# Patient Record
Sex: FEMALE | Race: WHITE | NOT HISPANIC OR LATINO | Employment: OTHER | URBAN - METROPOLITAN AREA
[De-identification: names, ages, dates, MRNs, and addresses within clinical notes are randomized per-mention and may not be internally consistent; named-entity substitution may affect disease eponyms.]

---

## 2019-03-11 ENCOUNTER — RECORDS - HEALTHEAST (OUTPATIENT)
Dept: LAB | Facility: CLINIC | Age: 69
End: 2019-03-11

## 2019-03-11 LAB
ALBUMIN SERPL-MCNC: 4.4 G/DL (ref 3.5–5)
ALP SERPL-CCNC: 84 U/L (ref 45–120)
ALT SERPL W P-5'-P-CCNC: 11 U/L (ref 0–45)
ANION GAP SERPL CALCULATED.3IONS-SCNC: 10 MMOL/L (ref 5–18)
AST SERPL W P-5'-P-CCNC: 13 U/L (ref 0–40)
BILIRUB SERPL-MCNC: 0.5 MG/DL (ref 0–1)
BNP SERPL-MCNC: 11 PG/ML (ref 0–117)
BUN SERPL-MCNC: 11 MG/DL (ref 8–22)
CALCIUM SERPL-MCNC: 10.1 MG/DL (ref 8.5–10.5)
CHLORIDE BLD-SCNC: 104 MMOL/L (ref 98–107)
CHOLEST SERPL-MCNC: 221 MG/DL
CO2 SERPL-SCNC: 28 MMOL/L (ref 22–31)
CREAT SERPL-MCNC: 0.88 MG/DL (ref 0.6–1.1)
FASTING STATUS PATIENT QL REPORTED: ABNORMAL
GFR SERPL CREATININE-BSD FRML MDRD: >60 ML/MIN/1.73M2
GLUCOSE BLD-MCNC: 92 MG/DL (ref 70–125)
HDLC SERPL-MCNC: 47 MG/DL
LDLC SERPL CALC-MCNC: 138 MG/DL
POTASSIUM BLD-SCNC: 4.5 MMOL/L (ref 3.5–5)
PROT SERPL-MCNC: 7.4 G/DL (ref 6–8)
SODIUM SERPL-SCNC: 142 MMOL/L (ref 136–145)
TRIGL SERPL-MCNC: 182 MG/DL
TSH SERPL DL<=0.005 MIU/L-ACNC: 1.07 UIU/ML (ref 0.3–5)

## 2019-03-18 ENCOUNTER — RECORDS - HEALTHEAST (OUTPATIENT)
Dept: LAB | Facility: CLINIC | Age: 69
End: 2019-03-18

## 2019-03-18 LAB
FOLATE SERPL-MCNC: 10.1 NG/ML
VIT B12 SERPL-MCNC: 219 PG/ML (ref 213–816)

## 2020-11-30 ENCOUNTER — RECORDS - HEALTHEAST (OUTPATIENT)
Dept: LAB | Facility: CLINIC | Age: 70
End: 2020-11-30

## 2020-11-30 LAB
ANION GAP SERPL CALCULATED.3IONS-SCNC: 13 MMOL/L (ref 5–18)
BUN SERPL-MCNC: 11 MG/DL (ref 8–28)
CALCIUM SERPL-MCNC: 10.3 MG/DL (ref 8.5–10.5)
CHLORIDE BLD-SCNC: 102 MMOL/L (ref 98–107)
CHOLEST SERPL-MCNC: 251 MG/DL
CO2 SERPL-SCNC: 26 MMOL/L (ref 22–31)
CREAT SERPL-MCNC: 1.2 MG/DL (ref 0.6–1.1)
FASTING STATUS PATIENT QL REPORTED: ABNORMAL
GFR SERPL CREATININE-BSD FRML MDRD: 44 ML/MIN/1.73M2
GLUCOSE BLD-MCNC: 112 MG/DL (ref 70–125)
HDLC SERPL-MCNC: 51 MG/DL
LDLC SERPL CALC-MCNC: 161 MG/DL
POTASSIUM BLD-SCNC: 4.2 MMOL/L (ref 3.5–5)
SODIUM SERPL-SCNC: 141 MMOL/L (ref 136–145)
TRIGL SERPL-MCNC: 193 MG/DL
VIT B12 SERPL-MCNC: 1701 PG/ML (ref 213–816)

## 2020-12-17 ENCOUNTER — RECORDS - HEALTHEAST (OUTPATIENT)
Dept: LAB | Facility: CLINIC | Age: 70
End: 2020-12-17

## 2020-12-17 LAB
ANION GAP SERPL CALCULATED.3IONS-SCNC: 12 MMOL/L (ref 5–18)
BUN SERPL-MCNC: 17 MG/DL (ref 8–28)
CALCIUM SERPL-MCNC: 9.7 MG/DL (ref 8.5–10.5)
CHLORIDE BLD-SCNC: 101 MMOL/L (ref 98–107)
CO2 SERPL-SCNC: 26 MMOL/L (ref 22–31)
CREAT SERPL-MCNC: 1.09 MG/DL (ref 0.6–1.1)
GFR SERPL CREATININE-BSD FRML MDRD: 49 ML/MIN/1.73M2
GLUCOSE BLD-MCNC: 116 MG/DL (ref 70–125)
POTASSIUM BLD-SCNC: 4.1 MMOL/L (ref 3.5–5)
SODIUM SERPL-SCNC: 139 MMOL/L (ref 136–145)

## 2021-05-27 ENCOUNTER — RECORDS - HEALTHEAST (OUTPATIENT)
Dept: ADMINISTRATIVE | Facility: CLINIC | Age: 71
End: 2021-05-27

## 2021-12-16 ENCOUNTER — LAB REQUISITION (OUTPATIENT)
Dept: LAB | Facility: CLINIC | Age: 71
End: 2021-12-16
Payer: COMMERCIAL

## 2021-12-16 DIAGNOSIS — E78.2 MIXED HYPERLIPIDEMIA: ICD-10-CM

## 2021-12-16 DIAGNOSIS — R20.2 PARESTHESIA OF SKIN: ICD-10-CM

## 2021-12-16 LAB
ALBUMIN SERPL-MCNC: 4.1 G/DL (ref 3.5–5)
ALP SERPL-CCNC: 101 U/L (ref 45–120)
ALT SERPL W P-5'-P-CCNC: 14 U/L (ref 0–45)
ANION GAP SERPL CALCULATED.3IONS-SCNC: 11 MMOL/L (ref 5–18)
AST SERPL W P-5'-P-CCNC: 17 U/L (ref 0–40)
BILIRUB SERPL-MCNC: 0.4 MG/DL (ref 0–1)
BUN SERPL-MCNC: 10 MG/DL (ref 8–28)
CALCIUM SERPL-MCNC: 10 MG/DL (ref 8.5–10.5)
CHLORIDE BLD-SCNC: 106 MMOL/L (ref 98–107)
CHOLEST SERPL-MCNC: 193 MG/DL
CO2 SERPL-SCNC: 25 MMOL/L (ref 22–31)
CREAT SERPL-MCNC: 0.92 MG/DL (ref 0.6–1.1)
FASTING STATUS PATIENT QL REPORTED: NORMAL
GFR SERPL CREATININE-BSD FRML MDRD: 62 ML/MIN/1.73M2
GLUCOSE BLD-MCNC: 105 MG/DL (ref 70–125)
HDLC SERPL-MCNC: 50 MG/DL
LDLC SERPL CALC-MCNC: 120 MG/DL
POTASSIUM BLD-SCNC: 4.3 MMOL/L (ref 3.5–5)
PROT SERPL-MCNC: 7 G/DL (ref 6–8)
SODIUM SERPL-SCNC: 142 MMOL/L (ref 136–145)
TRIGL SERPL-MCNC: 114 MG/DL
TSH SERPL DL<=0.005 MIU/L-ACNC: 1.28 UIU/ML (ref 0.3–5)

## 2021-12-16 PROCEDURE — 80061 LIPID PANEL: CPT | Mod: ORL | Performed by: FAMILY MEDICINE

## 2021-12-16 PROCEDURE — 84443 ASSAY THYROID STIM HORMONE: CPT | Mod: ORL | Performed by: FAMILY MEDICINE

## 2021-12-16 PROCEDURE — 80053 COMPREHEN METABOLIC PANEL: CPT | Mod: ORL | Performed by: FAMILY MEDICINE

## 2023-02-01 ENCOUNTER — LAB REQUISITION (OUTPATIENT)
Dept: LAB | Facility: CLINIC | Age: 73
End: 2023-02-01
Payer: COMMERCIAL

## 2023-02-01 DIAGNOSIS — E78.2 MIXED HYPERLIPIDEMIA: ICD-10-CM

## 2023-02-01 DIAGNOSIS — E53.8 DEFICIENCY OF OTHER SPECIFIED B GROUP VITAMINS: ICD-10-CM

## 2023-02-01 LAB
ALBUMIN SERPL BCG-MCNC: 4.4 G/DL (ref 3.5–5.2)
ALP SERPL-CCNC: 101 U/L (ref 35–104)
ALT SERPL W P-5'-P-CCNC: 17 U/L (ref 10–35)
ANION GAP SERPL CALCULATED.3IONS-SCNC: 13 MMOL/L (ref 7–15)
AST SERPL W P-5'-P-CCNC: 21 U/L (ref 10–35)
BILIRUB SERPL-MCNC: 0.3 MG/DL
BUN SERPL-MCNC: 14.6 MG/DL (ref 8–23)
CALCIUM SERPL-MCNC: 9.7 MG/DL (ref 8.8–10.2)
CHLORIDE SERPL-SCNC: 103 MMOL/L (ref 98–107)
CHOLEST SERPL-MCNC: 215 MG/DL
CREAT SERPL-MCNC: 0.98 MG/DL (ref 0.51–0.95)
DEPRECATED HCO3 PLAS-SCNC: 25 MMOL/L (ref 22–29)
GFR SERPL CREATININE-BSD FRML MDRD: 61 ML/MIN/1.73M2
GLUCOSE SERPL-MCNC: 107 MG/DL (ref 70–99)
HDLC SERPL-MCNC: 48 MG/DL
LDLC SERPL CALC-MCNC: 149 MG/DL
NONHDLC SERPL-MCNC: 167 MG/DL
POTASSIUM SERPL-SCNC: 4.4 MMOL/L (ref 3.4–5.3)
PROT SERPL-MCNC: 6.8 G/DL (ref 6.4–8.3)
SODIUM SERPL-SCNC: 141 MMOL/L (ref 136–145)
TRIGL SERPL-MCNC: 91 MG/DL
VIT B12 SERPL-MCNC: 1182 PG/ML (ref 232–1245)

## 2023-02-01 PROCEDURE — 80053 COMPREHEN METABOLIC PANEL: CPT | Mod: ORL | Performed by: FAMILY MEDICINE

## 2023-02-01 PROCEDURE — 82607 VITAMIN B-12: CPT | Mod: ORL | Performed by: FAMILY MEDICINE

## 2023-02-01 PROCEDURE — 80061 LIPID PANEL: CPT | Mod: ORL | Performed by: FAMILY MEDICINE

## 2024-04-02 ENCOUNTER — LAB REQUISITION (OUTPATIENT)
Dept: LAB | Facility: CLINIC | Age: 74
End: 2024-04-02
Payer: COMMERCIAL

## 2024-04-02 DIAGNOSIS — E53.8 DEFICIENCY OF OTHER SPECIFIED B GROUP VITAMINS: ICD-10-CM

## 2024-04-02 DIAGNOSIS — I10 ESSENTIAL (PRIMARY) HYPERTENSION: ICD-10-CM

## 2024-04-02 LAB
ANION GAP SERPL CALCULATED.3IONS-SCNC: 12 MMOL/L (ref 7–15)
BUN SERPL-MCNC: 9.9 MG/DL (ref 8–23)
CALCIUM SERPL-MCNC: 10.1 MG/DL (ref 8.8–10.2)
CHLORIDE SERPL-SCNC: 105 MMOL/L (ref 98–107)
CREAT SERPL-MCNC: 0.99 MG/DL (ref 0.51–0.95)
DEPRECATED HCO3 PLAS-SCNC: 25 MMOL/L (ref 22–29)
EGFRCR SERPLBLD CKD-EPI 2021: 60 ML/MIN/1.73M2
GLUCOSE SERPL-MCNC: 117 MG/DL (ref 70–99)
POTASSIUM SERPL-SCNC: 4.4 MMOL/L (ref 3.4–5.3)
SODIUM SERPL-SCNC: 142 MMOL/L (ref 135–145)
VIT B12 SERPL-MCNC: 1151 PG/ML (ref 232–1245)

## 2024-04-02 PROCEDURE — 82607 VITAMIN B-12: CPT | Mod: ORL | Performed by: STUDENT IN AN ORGANIZED HEALTH CARE EDUCATION/TRAINING PROGRAM

## 2024-04-02 PROCEDURE — 80048 BASIC METABOLIC PNL TOTAL CA: CPT | Mod: ORL | Performed by: STUDENT IN AN ORGANIZED HEALTH CARE EDUCATION/TRAINING PROGRAM

## 2024-04-04 ENCOUNTER — LAB REQUISITION (OUTPATIENT)
Dept: LAB | Facility: CLINIC | Age: 74
End: 2024-04-04
Payer: COMMERCIAL

## 2024-04-04 LAB
GRAM STAIN RESULT: ABNORMAL
GRAM STAIN RESULT: ABNORMAL

## 2024-04-04 PROCEDURE — 87186 SC STD MICRODIL/AGAR DIL: CPT | Mod: ORL | Performed by: ORTHOPAEDIC SURGERY

## 2024-04-04 PROCEDURE — 87075 CULTR BACTERIA EXCEPT BLOOD: CPT | Mod: ORL | Performed by: ORTHOPAEDIC SURGERY

## 2024-04-04 PROCEDURE — 87205 SMEAR GRAM STAIN: CPT | Mod: ORL | Performed by: ORTHOPAEDIC SURGERY

## 2024-04-09 LAB
BACTERIA SPEC CULT: ABNORMAL

## 2024-04-11 LAB — BACTERIA SPEC CULT: NORMAL

## 2024-05-29 ENCOUNTER — LAB REQUISITION (OUTPATIENT)
Dept: LAB | Facility: CLINIC | Age: 74
End: 2024-05-29
Payer: COMMERCIAL

## 2024-05-29 DIAGNOSIS — E78.2 MIXED HYPERLIPIDEMIA: ICD-10-CM

## 2024-05-29 PROCEDURE — 80061 LIPID PANEL: CPT | Mod: ORL | Performed by: FAMILY MEDICINE

## 2024-05-30 LAB
CHOLEST SERPL-MCNC: 152 MG/DL
FASTING STATUS PATIENT QL REPORTED: ABNORMAL
HDLC SERPL-MCNC: 47 MG/DL
LDLC SERPL CALC-MCNC: 77 MG/DL
NONHDLC SERPL-MCNC: 105 MG/DL
TRIGL SERPL-MCNC: 140 MG/DL

## 2024-10-18 ENCOUNTER — LAB REQUISITION (OUTPATIENT)
Dept: LAB | Facility: CLINIC | Age: 74
End: 2024-10-18
Payer: COMMERCIAL

## 2024-10-18 DIAGNOSIS — I10 ESSENTIAL (PRIMARY) HYPERTENSION: ICD-10-CM

## 2024-10-18 LAB
ANION GAP SERPL CALCULATED.3IONS-SCNC: 13 MMOL/L (ref 7–15)
BUN SERPL-MCNC: 12.8 MG/DL (ref 8–23)
CALCIUM SERPL-MCNC: 10.4 MG/DL (ref 8.8–10.4)
CHLORIDE SERPL-SCNC: 103 MMOL/L (ref 98–107)
CREAT SERPL-MCNC: 1.03 MG/DL (ref 0.51–0.95)
EGFRCR SERPLBLD CKD-EPI 2021: 56 ML/MIN/1.73M2
GLUCOSE SERPL-MCNC: 100 MG/DL (ref 70–99)
HCO3 SERPL-SCNC: 26 MMOL/L (ref 22–29)
POTASSIUM SERPL-SCNC: 4.2 MMOL/L (ref 3.4–5.3)
SODIUM SERPL-SCNC: 142 MMOL/L (ref 135–145)

## 2024-10-18 PROCEDURE — 80048 BASIC METABOLIC PNL TOTAL CA: CPT | Mod: ORL | Performed by: STUDENT IN AN ORGANIZED HEALTH CARE EDUCATION/TRAINING PROGRAM

## 2024-11-11 ENCOUNTER — LAB REQUISITION (OUTPATIENT)
Dept: LAB | Facility: CLINIC | Age: 74
End: 2024-11-11
Payer: COMMERCIAL

## 2024-11-11 DIAGNOSIS — I10 ESSENTIAL (PRIMARY) HYPERTENSION: ICD-10-CM

## 2024-11-11 PROCEDURE — 80048 BASIC METABOLIC PNL TOTAL CA: CPT | Mod: ORL

## 2024-11-12 LAB
ANION GAP SERPL CALCULATED.3IONS-SCNC: 13 MMOL/L (ref 7–15)
BUN SERPL-MCNC: 13.2 MG/DL (ref 8–23)
CALCIUM SERPL-MCNC: 10.3 MG/DL (ref 8.8–10.4)
CHLORIDE SERPL-SCNC: 102 MMOL/L (ref 98–107)
CREAT SERPL-MCNC: 1.37 MG/DL (ref 0.51–0.95)
EGFRCR SERPLBLD CKD-EPI 2021: 40 ML/MIN/1.73M2
GLUCOSE SERPL-MCNC: 96 MG/DL (ref 70–99)
HCO3 SERPL-SCNC: 27 MMOL/L (ref 22–29)
POTASSIUM SERPL-SCNC: 4.8 MMOL/L (ref 3.4–5.3)
SODIUM SERPL-SCNC: 142 MMOL/L (ref 135–145)

## 2024-11-17 ENCOUNTER — HOSPITAL ENCOUNTER (INPATIENT)
Facility: CLINIC | Age: 74
LOS: 2 days | Discharge: NURSING, REHAB OR LONG-TERM CARE | End: 2024-11-20
Attending: EMERGENCY MEDICINE | Admitting: STUDENT IN AN ORGANIZED HEALTH CARE EDUCATION/TRAINING PROGRAM
Payer: COMMERCIAL

## 2024-11-17 DIAGNOSIS — A41.9 ACUTE SEPSIS (H): ICD-10-CM

## 2024-11-17 DIAGNOSIS — K57.20 DIVERTICULITIS OF COLON WITH PERFORATION: ICD-10-CM

## 2024-11-17 PROCEDURE — 83605 ASSAY OF LACTIC ACID: CPT | Performed by: EMERGENCY MEDICINE

## 2024-11-17 PROCEDURE — 80053 COMPREHEN METABOLIC PANEL: CPT | Performed by: EMERGENCY MEDICINE

## 2024-11-17 PROCEDURE — 87040 BLOOD CULTURE FOR BACTERIA: CPT | Performed by: EMERGENCY MEDICINE

## 2024-11-17 PROCEDURE — 84484 ASSAY OF TROPONIN QUANT: CPT | Performed by: EMERGENCY MEDICINE

## 2024-11-17 PROCEDURE — 99291 CRITICAL CARE FIRST HOUR: CPT | Mod: 25

## 2024-11-17 PROCEDURE — 81003 URINALYSIS AUTO W/O SCOPE: CPT | Performed by: EMERGENCY MEDICINE

## 2024-11-17 PROCEDURE — 93005 ELECTROCARDIOGRAM TRACING: CPT | Performed by: EMERGENCY MEDICINE

## 2024-11-17 PROCEDURE — 87637 SARSCOV2&INF A&B&RSV AMP PRB: CPT | Performed by: EMERGENCY MEDICINE

## 2024-11-17 PROCEDURE — 258N000003 HC RX IP 258 OP 636: Performed by: EMERGENCY MEDICINE

## 2024-11-17 PROCEDURE — 85004 AUTOMATED DIFF WBC COUNT: CPT | Performed by: EMERGENCY MEDICINE

## 2024-11-17 PROCEDURE — 36415 COLL VENOUS BLD VENIPUNCTURE: CPT | Performed by: EMERGENCY MEDICINE

## 2024-11-17 PROCEDURE — 84145 PROCALCITONIN (PCT): CPT | Performed by: EMERGENCY MEDICINE

## 2024-11-17 PROCEDURE — 85610 PROTHROMBIN TIME: CPT | Performed by: EMERGENCY MEDICINE

## 2024-11-17 RX ORDER — CEFEPIME HYDROCHLORIDE 2 G/1
2 INJECTION, POWDER, FOR SOLUTION INTRAVENOUS ONCE
Status: DISCONTINUED | OUTPATIENT
Start: 2024-11-18 | End: 2024-11-17

## 2024-11-17 RX ORDER — ACETAMINOPHEN 325 MG/1
650 TABLET ORAL
Status: DISCONTINUED | OUTPATIENT
Start: 2024-11-17 | End: 2024-11-18

## 2024-11-17 RX ORDER — SODIUM CHLORIDE 9 MG/ML
INJECTION, SOLUTION INTRAVENOUS CONTINUOUS
Status: DISCONTINUED | OUTPATIENT
Start: 2024-11-18 | End: 2024-11-19

## 2024-11-17 RX ORDER — ACETAMINOPHEN 650 MG/1
650 SUPPOSITORY RECTAL
Status: DISCONTINUED | OUTPATIENT
Start: 2024-11-17 | End: 2024-11-18

## 2024-11-17 RX ORDER — MEROPENEM 1 G/1
1 INJECTION, POWDER, FOR SOLUTION INTRAVENOUS ONCE
Status: COMPLETED | OUTPATIENT
Start: 2024-11-18 | End: 2024-11-18

## 2024-11-17 RX ORDER — METRONIDAZOLE 500 MG/100ML
500 INJECTION, SOLUTION INTRAVENOUS ONCE
Status: DISCONTINUED | OUTPATIENT
Start: 2024-11-18 | End: 2024-11-17

## 2024-11-17 RX ADMIN — SODIUM CHLORIDE 2000 ML: 9 INJECTION, SOLUTION INTRAVENOUS at 23:58

## 2024-11-17 ASSESSMENT — COLUMBIA-SUICIDE SEVERITY RATING SCALE - C-SSRS
1. IN THE PAST MONTH, HAVE YOU WISHED YOU WERE DEAD OR WISHED YOU COULD GO TO SLEEP AND NOT WAKE UP?: NO
2. HAVE YOU ACTUALLY HAD ANY THOUGHTS OF KILLING YOURSELF IN THE PAST MONTH?: NO
6. HAVE YOU EVER DONE ANYTHING, STARTED TO DO ANYTHING, OR PREPARED TO DO ANYTHING TO END YOUR LIFE?: NO

## 2024-11-18 ENCOUNTER — APPOINTMENT (OUTPATIENT)
Dept: RADIOLOGY | Facility: CLINIC | Age: 74
End: 2024-11-18
Attending: EMERGENCY MEDICINE
Payer: COMMERCIAL

## 2024-11-18 ENCOUNTER — APPOINTMENT (OUTPATIENT)
Dept: CT IMAGING | Facility: CLINIC | Age: 74
End: 2024-11-18
Attending: EMERGENCY MEDICINE
Payer: COMMERCIAL

## 2024-11-18 PROBLEM — A41.9 ACUTE SEPSIS (H): Status: ACTIVE | Noted: 2024-11-18

## 2024-11-18 PROBLEM — K57.20 DIVERTICULITIS OF COLON WITH PERFORATION: Status: ACTIVE | Noted: 2024-11-18

## 2024-11-18 LAB
ALBUMIN SERPL BCG-MCNC: 4.2 G/DL (ref 3.5–5.2)
ALBUMIN UR-MCNC: 30 MG/DL
ALP SERPL-CCNC: 86 U/L (ref 40–150)
ALT SERPL W P-5'-P-CCNC: 16 U/L (ref 0–50)
ANION GAP SERPL CALCULATED.3IONS-SCNC: 15 MMOL/L (ref 7–15)
APPEARANCE UR: CLEAR
AST SERPL W P-5'-P-CCNC: 24 U/L (ref 0–45)
ATRIAL RATE - MUSE: 106 BPM
ATRIAL RATE - MUSE: 121 BPM
BASOPHILS # BLD AUTO: 0 10E3/UL (ref 0–0.2)
BASOPHILS NFR BLD AUTO: 0 %
BILIRUB SERPL-MCNC: 1 MG/DL
BILIRUB UR QL STRIP: NEGATIVE
BUN SERPL-MCNC: 17 MG/DL (ref 8–23)
CALCIUM SERPL-MCNC: 10.2 MG/DL (ref 8.8–10.4)
CHLORIDE SERPL-SCNC: 99 MMOL/L (ref 98–107)
COLOR UR AUTO: YELLOW
CREAT SERPL-MCNC: 1.2 MG/DL (ref 0.51–0.95)
DIASTOLIC BLOOD PRESSURE - MUSE: 75 MMHG
DIASTOLIC BLOOD PRESSURE - MUSE: NORMAL MMHG
EGFRCR SERPLBLD CKD-EPI 2021: 47 ML/MIN/1.73M2
EOSINOPHIL # BLD AUTO: 0 10E3/UL (ref 0–0.7)
EOSINOPHIL NFR BLD AUTO: 0 %
ERYTHROCYTE [DISTWIDTH] IN BLOOD BY AUTOMATED COUNT: 12.5 % (ref 10–15)
FLUAV RNA SPEC QL NAA+PROBE: NEGATIVE
FLUBV RNA RESP QL NAA+PROBE: NEGATIVE
GLUCOSE SERPL-MCNC: 162 MG/DL (ref 70–99)
GLUCOSE UR STRIP-MCNC: NEGATIVE MG/DL
HCO3 SERPL-SCNC: 24 MMOL/L (ref 22–29)
HCT VFR BLD AUTO: 39.3 % (ref 35–47)
HGB BLD-MCNC: 13.3 G/DL (ref 11.7–15.7)
HGB UR QL STRIP: ABNORMAL
IMM GRANULOCYTES # BLD: 0.2 10E3/UL
IMM GRANULOCYTES NFR BLD: 1 %
INR PPP: 1.21 (ref 0.85–1.15)
INTERPRETATION ECG - MUSE: NORMAL
INTERPRETATION ECG - MUSE: NORMAL
KETONES UR STRIP-MCNC: 10 MG/DL
LACTATE SERPL-SCNC: 1.5 MMOL/L (ref 0.7–2)
LEUKOCYTE ESTERASE UR QL STRIP: ABNORMAL
LYMPHOCYTES # BLD AUTO: 1 10E3/UL (ref 0.8–5.3)
LYMPHOCYTES NFR BLD AUTO: 5 %
MCH RBC QN AUTO: 29.7 PG (ref 26.5–33)
MCHC RBC AUTO-ENTMCNC: 33.8 G/DL (ref 31.5–36.5)
MCV RBC AUTO: 88 FL (ref 78–100)
MONOCYTES # BLD AUTO: 1.5 10E3/UL (ref 0–1.3)
MONOCYTES NFR BLD AUTO: 8 %
NEUTROPHILS # BLD AUTO: 16.8 10E3/UL (ref 1.6–8.3)
NEUTROPHILS NFR BLD AUTO: 86 %
NITRATE UR QL: NEGATIVE
NRBC # BLD AUTO: 0 10E3/UL
NRBC BLD AUTO-RTO: 0 /100
P AXIS - MUSE: 60 DEGREES
P AXIS - MUSE: 67 DEGREES
PH UR STRIP: 6.5 [PH] (ref 5–7)
PLATELET # BLD AUTO: 207 10E3/UL (ref 150–450)
POTASSIUM SERPL-SCNC: 4 MMOL/L (ref 3.4–5.3)
PR INTERVAL - MUSE: 152 MS
PR INTERVAL - MUSE: 158 MS
PROCALCITONIN SERPL IA-MCNC: 0.41 NG/ML
PROT SERPL-MCNC: 7.7 G/DL (ref 6.4–8.3)
QRS DURATION - MUSE: 82 MS
QRS DURATION - MUSE: 86 MS
QT - MUSE: 320 MS
QT - MUSE: 336 MS
QTC - MUSE: 446 MS
QTC - MUSE: 454 MS
R AXIS - MUSE: 52 DEGREES
R AXIS - MUSE: 58 DEGREES
RBC # BLD AUTO: 4.48 10E6/UL (ref 3.8–5.2)
RBC URINE: 3 /HPF
RSV RNA SPEC NAA+PROBE: NEGATIVE
SARS-COV-2 RNA RESP QL NAA+PROBE: NEGATIVE
SODIUM SERPL-SCNC: 138 MMOL/L (ref 135–145)
SP GR UR STRIP: 1.02 (ref 1–1.03)
SQUAMOUS EPITHELIAL: 1 /HPF
SYSTOLIC BLOOD PRESSURE - MUSE: 112 MMHG
SYSTOLIC BLOOD PRESSURE - MUSE: NORMAL MMHG
T AXIS - MUSE: 58 DEGREES
T AXIS - MUSE: 61 DEGREES
TROPONIN T SERPL HS-MCNC: 14 NG/L
TROPONIN T SERPL HS-MCNC: 15 NG/L
UROBILINOGEN UR STRIP-MCNC: <2 MG/DL
VENTRICULAR RATE- MUSE: 106 BPM
VENTRICULAR RATE- MUSE: 121 BPM
WBC # BLD AUTO: 19.6 10E3/UL (ref 4–11)
WBC URINE: 3 /HPF

## 2024-11-18 PROCEDURE — 93005 ELECTROCARDIOGRAM TRACING: CPT | Performed by: EMERGENCY MEDICINE

## 2024-11-18 PROCEDURE — 120N000001 HC R&B MED SURG/OB

## 2024-11-18 PROCEDURE — 250N000013 HC RX MED GY IP 250 OP 250 PS 637

## 2024-11-18 PROCEDURE — 250N000011 HC RX IP 250 OP 636

## 2024-11-18 PROCEDURE — 84484 ASSAY OF TROPONIN QUANT: CPT | Performed by: EMERGENCY MEDICINE

## 2024-11-18 PROCEDURE — 96365 THER/PROPH/DIAG IV INF INIT: CPT | Mod: 59

## 2024-11-18 PROCEDURE — 250N000013 HC RX MED GY IP 250 OP 250 PS 637: Performed by: SURGERY

## 2024-11-18 PROCEDURE — 250N000011 HC RX IP 250 OP 636: Performed by: EMERGENCY MEDICINE

## 2024-11-18 PROCEDURE — 258N000003 HC RX IP 258 OP 636

## 2024-11-18 PROCEDURE — 96361 HYDRATE IV INFUSION ADD-ON: CPT

## 2024-11-18 PROCEDURE — 36415 COLL VENOUS BLD VENIPUNCTURE: CPT | Performed by: EMERGENCY MEDICINE

## 2024-11-18 PROCEDURE — 74177 CT ABD & PELVIS W/CONTRAST: CPT

## 2024-11-18 PROCEDURE — 99223 1ST HOSP IP/OBS HIGH 75: CPT | Mod: AI | Performed by: STUDENT IN AN ORGANIZED HEALTH CARE EDUCATION/TRAINING PROGRAM

## 2024-11-18 PROCEDURE — 99222 1ST HOSP IP/OBS MODERATE 55: CPT | Performed by: SURGERY

## 2024-11-18 PROCEDURE — 71045 X-RAY EXAM CHEST 1 VIEW: CPT

## 2024-11-18 PROCEDURE — 258N000003 HC RX IP 258 OP 636: Performed by: EMERGENCY MEDICINE

## 2024-11-18 PROCEDURE — 250N000013 HC RX MED GY IP 250 OP 250 PS 637: Performed by: EMERGENCY MEDICINE

## 2024-11-18 RX ORDER — METRONIDAZOLE 500 MG/1
500 TABLET ORAL 3 TIMES DAILY
Status: DISCONTINUED | OUTPATIENT
Start: 2024-11-18 | End: 2024-11-19

## 2024-11-18 RX ORDER — PROCHLORPERAZINE MALEATE 5 MG/1
5 TABLET ORAL EVERY 6 HOURS PRN
Status: DISCONTINUED | OUTPATIENT
Start: 2024-11-18 | End: 2024-11-20 | Stop reason: HOSPADM

## 2024-11-18 RX ORDER — SIMVASTATIN 20 MG
20 TABLET ORAL AT BEDTIME
Status: DISCONTINUED | OUTPATIENT
Start: 2024-11-18 | End: 2024-11-20 | Stop reason: HOSPADM

## 2024-11-18 RX ORDER — ONDANSETRON 2 MG/ML
4 INJECTION INTRAMUSCULAR; INTRAVENOUS EVERY 6 HOURS PRN
Status: DISCONTINUED | OUTPATIENT
Start: 2024-11-18 | End: 2024-11-20 | Stop reason: HOSPADM

## 2024-11-18 RX ORDER — SIMVASTATIN 20 MG
20 TABLET ORAL AT BEDTIME
COMMUNITY

## 2024-11-18 RX ORDER — MEROPENEM 1 G/1
1 INJECTION, POWDER, FOR SOLUTION INTRAVENOUS EVERY 12 HOURS
Status: DISCONTINUED | OUTPATIENT
Start: 2024-11-18 | End: 2024-11-18

## 2024-11-18 RX ORDER — FAMOTIDINE 20 MG/1
20 TABLET, FILM COATED ORAL DAILY
COMMUNITY

## 2024-11-18 RX ORDER — FLUTICASONE FUROATE, UMECLIDINIUM BROMIDE AND VILANTEROL TRIFENATATE 100; 62.5; 25 UG/1; UG/1; UG/1
1 POWDER RESPIRATORY (INHALATION) DAILY
COMMUNITY

## 2024-11-18 RX ORDER — POLYETHYLENE GLYCOL 3350 17 G/17G
17 POWDER, FOR SOLUTION ORAL 2 TIMES DAILY PRN
Status: DISCONTINUED | OUTPATIENT
Start: 2024-11-18 | End: 2024-11-20 | Stop reason: HOSPADM

## 2024-11-18 RX ORDER — LANOLIN ALCOHOL/MO/W.PET/CERES
1000 CREAM (GRAM) TOPICAL DAILY
COMMUNITY

## 2024-11-18 RX ORDER — CALCIUM CARBONATE 500 MG/1
1 TABLET, CHEWABLE ORAL 4 TIMES DAILY PRN
COMMUNITY

## 2024-11-18 RX ORDER — SIMVASTATIN 40 MG
40 TABLET ORAL EVERY EVENING
Status: ON HOLD | COMMUNITY
Start: 2023-12-16 | End: 2024-11-18

## 2024-11-18 RX ORDER — MAGNESIUM HYDROXIDE/ALUMINUM HYDROXICE/SIMETHICONE 120; 1200; 1200 MG/30ML; MG/30ML; MG/30ML
15 SUSPENSION ORAL ONCE
Status: COMPLETED | OUTPATIENT
Start: 2024-11-18 | End: 2024-11-18

## 2024-11-18 RX ORDER — ONDANSETRON 4 MG/1
4 TABLET, ORALLY DISINTEGRATING ORAL EVERY 6 HOURS PRN
Status: DISCONTINUED | OUTPATIENT
Start: 2024-11-18 | End: 2024-11-20 | Stop reason: HOSPADM

## 2024-11-18 RX ORDER — CIPROFLOXACIN 500 MG/1
500 TABLET, FILM COATED ORAL EVERY 12 HOURS SCHEDULED
Status: DISCONTINUED | OUTPATIENT
Start: 2024-11-18 | End: 2024-11-19

## 2024-11-18 RX ORDER — PLANT STANOL ESTER 450 MG
1 TABLET ORAL DAILY
COMMUNITY

## 2024-11-18 RX ORDER — ALBUTEROL SULFATE 90 UG/1
1 INHALANT RESPIRATORY (INHALATION) EVERY 4 HOURS PRN
Status: DISCONTINUED | OUTPATIENT
Start: 2024-11-18 | End: 2024-11-20 | Stop reason: HOSPADM

## 2024-11-18 RX ORDER — LIDOCAINE 40 MG/G
CREAM TOPICAL
Status: DISCONTINUED | OUTPATIENT
Start: 2024-11-18 | End: 2024-11-20 | Stop reason: HOSPADM

## 2024-11-18 RX ORDER — LOSARTAN POTASSIUM 50 MG/1
25 TABLET ORAL DAILY
COMMUNITY

## 2024-11-18 RX ORDER — AMOXICILLIN 250 MG
1 CAPSULE ORAL 2 TIMES DAILY PRN
Status: DISCONTINUED | OUTPATIENT
Start: 2024-11-18 | End: 2024-11-20 | Stop reason: HOSPADM

## 2024-11-18 RX ORDER — ALBUTEROL SULFATE 90 UG/1
1 INHALANT RESPIRATORY (INHALATION) EVERY 4 HOURS PRN
COMMUNITY

## 2024-11-18 RX ORDER — LOSARTAN POTASSIUM 25 MG/1
25 TABLET ORAL DAILY
Status: DISCONTINUED | OUTPATIENT
Start: 2024-11-18 | End: 2024-11-20 | Stop reason: HOSPADM

## 2024-11-18 RX ORDER — ACETAMINOPHEN 325 MG/1
325 TABLET ORAL EVERY 4 HOURS PRN
Status: DISCONTINUED | OUTPATIENT
Start: 2024-11-18 | End: 2024-11-20 | Stop reason: HOSPADM

## 2024-11-18 RX ORDER — AMOXICILLIN 250 MG
2 CAPSULE ORAL 2 TIMES DAILY PRN
Status: DISCONTINUED | OUTPATIENT
Start: 2024-11-18 | End: 2024-11-20 | Stop reason: HOSPADM

## 2024-11-18 RX ORDER — ACETAMINOPHEN 650 MG/1
650 SUPPOSITORY RECTAL EVERY 4 HOURS PRN
Status: DISCONTINUED | OUTPATIENT
Start: 2024-11-18 | End: 2024-11-20 | Stop reason: HOSPADM

## 2024-11-18 RX ORDER — CALCIUM CARBONATE 500 MG/1
1000 TABLET, CHEWABLE ORAL 4 TIMES DAILY PRN
Status: DISCONTINUED | OUTPATIENT
Start: 2024-11-18 | End: 2024-11-19

## 2024-11-18 RX ORDER — MAGNESIUM HYDROXIDE/ALUMINUM HYDROXICE/SIMETHICONE 120; 1200; 1200 MG/30ML; MG/30ML; MG/30ML
30 SUSPENSION ORAL EVERY 4 HOURS PRN
Status: DISCONTINUED | OUTPATIENT
Start: 2024-11-18 | End: 2024-11-20 | Stop reason: HOSPADM

## 2024-11-18 RX ORDER — CALCIUM CARBONATE 500 MG/1
1000 TABLET, CHEWABLE ORAL ONCE
Status: COMPLETED | OUTPATIENT
Start: 2024-11-18 | End: 2024-11-18

## 2024-11-18 RX ORDER — IOPAMIDOL 755 MG/ML
100 INJECTION, SOLUTION INTRAVASCULAR ONCE
Status: DISCONTINUED | OUTPATIENT
Start: 2024-11-18 | End: 2024-11-18

## 2024-11-18 RX ORDER — ACETAMINOPHEN 325 MG/1
650 TABLET ORAL EVERY 4 HOURS PRN
Status: DISCONTINUED | OUTPATIENT
Start: 2024-11-18 | End: 2024-11-20 | Stop reason: HOSPADM

## 2024-11-18 RX ADMIN — ALUMINUM HYDROXIDE, MAGNESIUM HYDROXIDE, AND SIMETHICONE 15 ML: 1200; 120; 1200 SUSPENSION ORAL at 01:46

## 2024-11-18 RX ADMIN — MEROPENEM 1 G: 1 INJECTION, POWDER, FOR SOLUTION INTRAVENOUS at 11:03

## 2024-11-18 RX ADMIN — CIPROFLOXACIN 500 MG: 500 TABLET ORAL at 20:10

## 2024-11-18 RX ADMIN — ACETAMINOPHEN 650 MG: 325 TABLET ORAL at 00:10

## 2024-11-18 RX ADMIN — ALUMINUM HYDROXIDE, MAGNESIUM HYDROXIDE, AND SIMETHICONE 30 ML: 1200; 120; 1200 SUSPENSION ORAL at 03:25

## 2024-11-18 RX ADMIN — LOSARTAN POTASSIUM 25 MG: 25 TABLET, FILM COATED ORAL at 11:06

## 2024-11-18 RX ADMIN — ALBUTEROL SULFATE 1 PUFF: 90 INHALANT RESPIRATORY (INHALATION) at 14:47

## 2024-11-18 RX ADMIN — CALCIUM CARBONATE 1000 MG: 500 TABLET, CHEWABLE ORAL at 00:11

## 2024-11-18 RX ADMIN — SODIUM CHLORIDE: 9 INJECTION, SOLUTION INTRAVENOUS at 16:20

## 2024-11-18 RX ADMIN — METRONIDAZOLE 500 MG: 500 TABLET ORAL at 20:10

## 2024-11-18 RX ADMIN — SODIUM CHLORIDE: 9 INJECTION, SOLUTION INTRAVENOUS at 02:31

## 2024-11-18 RX ADMIN — SODIUM CHLORIDE: 9 INJECTION, SOLUTION INTRAVENOUS at 08:50

## 2024-11-18 RX ADMIN — SODIUM CHLORIDE: 9 INJECTION, SOLUTION INTRAVENOUS at 23:53

## 2024-11-18 RX ADMIN — ALUMINUM HYDROXIDE, MAGNESIUM HYDROXIDE, AND SIMETHICONE 30 ML: 1200; 120; 1200 SUSPENSION ORAL at 14:31

## 2024-11-18 RX ADMIN — MEROPENEM 1 G: 1 INJECTION, POWDER, FOR SOLUTION INTRAVENOUS at 00:04

## 2024-11-18 RX ADMIN — METRONIDAZOLE 500 MG: 500 TABLET ORAL at 14:28

## 2024-11-18 RX ADMIN — SIMVASTATIN 20 MG: 20 TABLET, FILM COATED ORAL at 20:10

## 2024-11-18 RX ADMIN — ACETAMINOPHEN 650 MG: 325 TABLET ORAL at 08:43

## 2024-11-18 RX ADMIN — ACETAMINOPHEN 325 MG: 325 TABLET ORAL at 20:10

## 2024-11-18 RX ADMIN — IOPAMIDOL 75 ML: 755 INJECTION, SOLUTION INTRAVENOUS at 00:54

## 2024-11-18 NOTE — PHARMACY-ADMISSION MEDICATION HISTORY
Pharmacy Intern Admission Medication History    Admission medication history is complete. The information provided in this note is only as accurate as the sources available at the time of the update.    Information Source(s): Patient via in-person    Changes made to PTA medication list:  Added: Albuterol, Trelegy, Losartan, Simvastatin, Calcium-Mg-Zinc, Potassium   Deleted: None  Changed: None    Allergies reviewed with patient and updates made in EHR: yes    Medication History Completed By: Chica Torres 11/18/2024 8:07 AM    PTA Med List   Medication Sig Note Last Dose/Taking    albuterol (PROAIR HFA/PROVENTIL HFA/VENTOLIN HFA) 108 (90 Base) MCG/ACT inhaler Inhale 1 puff into the lungs every 4 hours as needed.  Past Month    Calcium Magnesium Zinc 333-133-5 MG TABS Take 3 tablets by mouth daily.  11/17/2024 Morning    losartan (COZAAR) 50 MG tablet Take 25 mg by mouth daily.  11/17/2024 Morning    potassium gluconate 2.5 MEQ tablet Take 1 tablet by mouth daily. 11/18/2024: Taking OTC. Exact product not known.  11/17/2024    simvastatin (ZOCOR) 20 MG tablet Take 20 mg by mouth at bedtime.  11/16/2024 Bedtime    TRELEGY ELLIPTA 100-62.5-25 MCG/ACT oral inhaler Inhale 1 puff into the lungs daily.  11/17/2024 Morning

## 2024-11-18 NOTE — ED TRIAGE NOTES
Pt is from home where she lives with her son. Pts son left the house around 1400 and returned tonight around 2200. Pt was found to be half in the chair and half out.  Pts son tried to help her up but ended up helping her to  the ground. Pt could not get up on her own, stated she was too weak.   Pt has had some loose stools through out the day and complains of some tender in the abdomen      Triage Assessment (Adult)       Row Name 11/17/24 [id number]          Triage Assessment    Airway WDL WDL        Respiratory WDL    Respiratory WDL WDL        Skin Circulation/Temperature WDL    Skin Circulation/Temperature WDL temperature     Skin Temperature warm        Cardiac WDL    Cardiac WDL WDL        Peripheral/Neurovascular WDL    Peripheral Neurovascular WDL WDL        Cognitive/Neuro/Behavioral WDL    Cognitive/Neuro/Behavioral WDL WDL

## 2024-11-18 NOTE — H&P
[facility]    History and Physical - Hospitalist Service       Date of Admission:  11/17/2024    Assessment & Plan      Alexandra Mckeon is a 74 year old female admitted on 11/17/2024. She has a history of COPD, hypertension, hyperlipidemia, and heartburn and is admitted for acute perforated sigmoid diverticulitis with sepsis.    Medication reconciliation not yet completed    Acute perforated sigmoid diverticulitis  Sepsis  Leukocytosis  Fever  Sinus tachycardia  Presents with a week of generalized abdominal pain and 1 day of generalized weakness and was found to be febrile with leukocytosis of 19.6, tachycardic to 110-120 and with CT demonstrating acute perforated sigmoid diverticulitis.  Reassuringly, exam without rebound or guarding but she does have mild diffuse tenderness in lower quadrants.  Lactic acid within normal limits.  ED provider discussed with on-call general surgery noting localized perforated gas without evidence of abscess at this time and general surgery plans to see patient in the morning. S/p~2.25 L bolus and dose of meropenem in ED.  -General Surgery consulted, appreciate recommendations and cares  -Continue meropenem for now, has penicillin allergy  -Continue maintenance IV fluids  -Bowel rest  -Zofran as needed  -Tylenol as needed  -Follow CBC  -Follow-up outpatient for colonoscopy    Chronic conditions:     Chronic kidney disease stage III  Creatinine of 1.20 on admission with recent creatinine 1.37 one week ago, creatinine around 1.0 in the last year.  -Follow BMP    COPD  -PTA Trelegy  -PTA albuterol    Hypertension  -PTA losartan    Heartburn  -PTA famotidine  -Tums as needed  -Maalox as needed    Hyperlipidemia  -PTA simvastatin        Diet: NPO for Medical/Clinical Reasons Except for: Meds  DVT Prophylaxis: Pneumatic Compression Devices  Ugalde Catheter: Not present  Fluids: mIVF  Lines: None     Cardiac Monitoring: None  Code Status: No CPR- Do NOT  "Intubate    Clinically Significant Risk Factors Present on Admission                # Coagulation Defect: INR = 1.21 (Ref range: 0.85 - 1.15) and/or PTT = N/A, will monitor for bleeding              # Obesity: Estimated body mass index is 30.18 kg/m  as calculated from the following:    Height as of this encounter: 1.575 m (5' 2\").    Weight as of this encounter: 74.8 kg (165 lb).              Disposition Plan      Expected Discharge Date: 11/20/2024                The patient's care was discussed with the Attending Physician, Dr. Rick Somers .      Criselda Crowley MD  Hospitalist Service  [facility]  Securely message with [employer] (more info)  Text page via [facility] Paging/Directory   ______________________________________________________________________    Chief Complaint   Abdominal pain    History is obtained from the patient    History of Present Illness   Alexandra Mckeon is a 74 year old female admitted on 11/17/2024. She has a history of COPD, hypertension, hyperlipidemia, and heartburn and is admitted for acute perforated sigmoid diverticulitis with sepsis.    Patient states she has had about 1 week of generalized dull abdominal pain.  She states the abdominal pain may have gotten worse in recent days.  She also has had 1 day of generalized weakness.  She states earlier today she was unable to get out of the chair on her own.  Her son who lives with her was gone for the day and returned this evening finding her half out of her chair.  Patient has been having diarrhea however this is not atypical for her.  She has had chills but no fever.  No chest pain or shortness of breath.  No significant nausea or vomiting.  Has not had symptoms like this before.    Patient lives at home with her son.  She does not use any assistive device for ambulation.  The patient enjoys watching TV and playing bingo.  She denies any recent falls.  Does all of her own ADLs.  Patient reports roughly 50-year pack " history of tobacco use.  Denies any alcohol use.  Denies any other substance use.      Past Medical History    Past Medical History:   Diagnosis Date    COPD (chronic obstructive pulmonary disease) (H)     HLD (hyperlipidemia)     Hypertension        Past Surgical History   No past surgical history on file.    Prior to Admission Medications   None        Social History   I have reviewed this patient's social history and updated it with pertinent information if needed.  Social History     Tobacco Use    Smoking status: Former     Current packs/day: 0.00     Average packs/day: 1 pack/day for 49.0 years (49.0 ttl pk-yrs)     Types: Cigarettes     Start date:      Quit date:      Years since quittin.8        Physical Exam   Vital Signs: Temp: (!) 100.7  F (38.2  C) Temp src: Oral BP: (!) 147/77 Pulse: 102   Resp: 30 SpO2: 91 % O2 Device: None (Room air)    Weight: 165 lbs 0 oz  Constitutional: awake, alert, cooperative, no apparent distress, and appears stated age  Eyes: Lids and lashes normal, pupils equal, round and reactive to light, extra ocular muscles intact, sclera clear, conjunctiva normal  ENT: Normocephalic, without obvious abnormality, atraumatic, external ears without lesions, oral pharynx with dry mucous membranes, tonsils without erythema or exudates, upper dentures  Respiratory: No increased work of breathing, good air exchange, clear to auscultation bilaterally, no crackles or wheezing  Cardiovascular: Regular rate and rhythm, normal S1 and S2, no S3 or S4, and no murmur noted. No peripheral edema  GI: Soft, non-distended, mild tenderness to palpation in left lower quadrant and right lower quadrant, no rebound, no guarding, no masses palpated  Skin: no bruising or bleeding, normal skin color, texture, turgor, and no redness, warmth, or swelling  Musculoskeletal: There is no redness, warmth, or swelling of the joints.  Full range of motion noted.  Motor strength is 5 out of 5 all extremities  bilaterally.  Tone is normal.  Neurologic: Awake, alert, oriented to name, place and time.  Cranial nerves II-XII are grossly intact.    Neuropsychiatric: General: normal, calm, and normal eye contact      Data     I have personally reviewed the following data over the past 24 hrs:    19.6 (H)  \   13.3   / 207     138 99 17.0 /  162 (H)   4.0 24 1.20 (H) \     ALT: 16 AST: 24 AP: 86 TBILI: 1.0   ALB: 4.2 TOT PROTEIN: 7.7 LIPASE: N/A     Trop: 15 (H) BNP: N/A     Procal: 0.41 CRP: N/A Lactic Acid: 1.5       INR:  1.21 (H) PTT:  N/A   D-dimer:  N/A Fibrinogen:  N/A       Imaging results reviewed over the past 24 hrs:   Recent Results (from the past 24 hours)   XR Chest Port 1 View    Narrative    EXAM: XR CHEST PORT 1 VIEW  LOCATION: [facility]  DATE: 11/18/2024    INDICATION: Fever and cough.  COMPARISON: 3/11/2019.      Impression    IMPRESSION: No acute airspace consolidation. No pleural effusion or pneumothorax. Chin soft tissues obscure both lung apices.    Upper limits of normal heart size. Atherosclerotic calcifications of the thoracic aorta.   CT Abdomen Pelvis w Contrast    Narrative    EXAM: CT ABDOMEN PELVIS W CONTRAST  LOCATION: [facility]  DATE: 11/18/2024    INDICATION: Abdominal pain.  COMPARISON: None available.  TECHNIQUE: CT scan of the abdomen and pelvis was performed following injection of IV contrast. Multiplanar reformats were obtained. Dose reduction techniques were used.  CONTRAST: 75 mL Isovue-370.    FINDINGS:    LOWER CHEST: Large hiatal hernia. Atherosclerosis of coronary arteries and visualized thoracic aorta.    HEPATOBILIARY: Hepatomegaly and diffuse hepatic steatosis.    Gallbladder is normal.    No intrahepatic or intrahepatic biliary ductal dilatation.    PANCREAS: Enhances normally. No peripancreatic inflammatory fat stranding.    SPLEEN: Enhances normally. Normal size.    ADRENAL GLANDS: Normal.    KIDNEYS: Both kidneys enhance  symmetrically, without hydronephrosis. Mild cortical scarring at the anterior mid zone right kidney.    Tiny nonobstructing left renal stone. Low-attenuation subcentimeter renal lesion(s). These are compatible with small benign cysts and no specific imaging evaluation or follow-up is recommended.    Urinary bladder is unremarkable.    PELVIC ORGANS: Hysterectomy.    BOWEL: Acute diverticulitis of the mid sigmoid colon, with associated perforation, moderate to severe sigmoid wall thickening and moderate surrounding inflammatory fat stranding. Perforated gas maintenance relatively localized to the site of   diverticulitis, though does not appear distinctly contained. No evidence of abscess. Tiny intramural lipoma of the duodenal sweep. Normal appendix.    LYMPH NODES: No suspicious abdominal or pelvic lymphadenopathy.    VASCULATURE: No abdominal aortic aneurysm. Focal ectasia of the infrarenal abdominal aorta, measuring up to 2.8 cm. Moderate to severe atheromatous disease of the abdominal aorta and branch vessels.    MUSCULOSKELETAL: No suspicious abnormality.    OTHER: No additionally significant abnormalities.      Impression    IMPRESSION:   1.  Acute, perforated sigmoid diverticulitis. The perforated gas is relatively localized to the site of acute diverticulitis, though does not appear particularly contained; surgical consultation recommended. No evidence of abscess. Colonoscopy follow-up   is additionally recommended, given the associated wall thickening.  2.  Hepatomegaly and diffuse hepatic steatosis.  3.  Large hiatal hernia.  4.  Tiny nonobstructing left renal stone.      Critical Result: Pneumoperitoneum/bowel perforation    Finding was identified on 11/18/2024 1:28 AM CST.    Dr. Baird was contacted by me on 11/18/2024 1:30 AM CST and verbalized understanding of the critical result.           Initial (On Arrival)

## 2024-11-18 NOTE — PROGRESS NOTES
[facility]    Progress Note - Hospitalist Service       Date of Admission:  11/17/2024    Assessment & Plan   Alexandra Mckeon is a 74 year old female admitted on 11/17/2024. She has a history of COPD, HTN, HLD, heartburn and is admitted for acute perforated sigmoid diverticulitis with sepsis.     Acute severe perforated diverticulitis  Sepsis  Leukocytosis  Fever  Sinus tachycardia  Presents with a week of generalized abdominal pain and 1 day of generalized weakness and was found to be febrile with leukocytosis of 19.6, tachycardic to 110-120 and with CT demonstrating acute perforated sigmoid diverticulitis.  Exam without rebound or guarding. Lactic acid within normal limits.  ED provider discussed with on-call general surgery noting localized perforated gas without evidence of abscess at this time and general surgery consulted. S/p~2.25 L bolus and dose of meropenem in ED.    -General Surgery consulted, appreciate recommendations and cares   - No surgery planned as of 11/18 AM   - PO Ciprofloxacin 500 mg BID  - PO Flagyl 500 mg TID  - Discontinue meropenem  - Continue maintenance IV fluids  - Bowel rest  - Zofran as needed  - Tylenol as needed  - Follow CBC  - Follow-up outpatient for colonoscopy    Chronic conditions:  Chronic kidney disease stage III  Creatinine of 1.20 on admission with recent creatinine 1.37 one week ago, creatinine around 1.0 in the last year.  -Follow BMP    COPD  -PTA Trelegy  -PTA albuterol     Hypertension  -PTA losartan     Heartburn  -PTA famotidine  -Tums as needed  -Maalox as needed     Hyperlipidemia  -PTA simvastatin        Diet: NPO for Medical/Clinical Reasons Except for: Ice Chips, Meds    DVT Prophylaxis: Pneumatic Compression Devices  Ugalde Catheter: Not present  Fluids: mIVF @ 150 mL/h   Lines: None     Cardiac Monitoring: None  Code Status: No CPR- Do NOT Intubate      Clinically Significant Risk Factors Present on Admission                #  "Coagulation Defect: INR = 1.21 (Ref range: 0.85 - 1.15) and/or PTT = N/A, will monitor for bleeding    # Hypertension: Home medication list includes antihypertensive(s)     # Acute Hypoxic Respiratory Failure: Documented O2 saturation < 90%. Continue supplemental oxygen as needed        # Obesity: Estimated body mass index is 30.81 kg/m  as calculated from the following:    Height as of this encounter: 1.575 m (5' 2\").    Weight as of this encounter: 76.4 kg (168 lb 6.9 oz).              Social Drivers of Health   Tobacco Use: Medium Risk (11/18/2024)    Patient History     Smoking Tobacco Use: Former     Smokeless Tobacco Use: Unknown         Disposition Plan     Medically Ready for Discharge: Anticipated in 2-4 Days         The patient's care was discussed with the Attending Physician, Dr. Tita Raines MD .    Holly Herring MD  Hospitalist Service  [facility]  Securely message with [employer] (more info)  Text page via [employer] Paging/Directory   ______________________________________________________________________    Interval History   Alexandra states that her pain has improved overnight.  She has had multiple bowel movements, and says she is thirsty.  General surgery saw her this morning, no intervention is currently planned.  We will continue bowel rest today in the setting of acute perforated diverticulitis    Physical Exam   Vital Signs: Temp: 98.8  F (37.1  C) Temp src: Oral BP: 120/59 Pulse: 98   Resp: 20 SpO2: 92 % O2 Device: Nasal cannula Oxygen Delivery: 1 LPM  Weight: 168 lbs 6.9 oz    Physical Exam  Constitutional:       General: She is not in acute distress.     Appearance: Normal appearance. She is not ill-appearing.   Cardiovascular:      Rate and Rhythm: Normal rate and regular rhythm.      Pulses: Normal pulses.      Heart sounds: Normal heart sounds.   Pulmonary:      Effort: Pulmonary effort is normal.      Breath sounds: Normal breath sounds.   Abdominal:      General: " Abdomen is flat. Bowel sounds are normal.      Tenderness: There is abdominal tenderness. There is no guarding or rebound.      Comments: Tender to palpation in the epigastric region and left lower quadrant.    Skin:     General: Skin is warm and dry.   Neurological:      Mental Status: She is alert.         Data     I have personally reviewed the following data over the past 24 hrs:    19.6 (H)  \   13.3   / 207     138 99 17.0 /  162 (H)   4.0 24 1.20 (H) \     ALT: 16 AST: 24 AP: 86 TBILI: 1.0   ALB: 4.2 TOT PROTEIN: 7.7 LIPASE: N/A     Trop: 15 (H) BNP: N/A     Procal: 0.41 CRP: N/A Lactic Acid: 1.5       INR:  1.21 (H) PTT:  N/A   D-dimer:  N/A Fibrinogen:  N/A       Imaging results reviewed over the past 24 hrs:   Recent Results (from the past 24 hours)   XR Chest Port 1 View    Narrative    EXAM: XR CHEST PORT 1 VIEW  LOCATION: [facility]  DATE: 11/18/2024    INDICATION: Fever and cough.  COMPARISON: 3/11/2019.      Impression    IMPRESSION: No acute airspace consolidation. No pleural effusion or pneumothorax. Chin soft tissues obscure both lung apices.    Upper limits of normal heart size. Atherosclerotic calcifications of the thoracic aorta.   CT Abdomen Pelvis w Contrast    Narrative    EXAM: CT ABDOMEN PELVIS W CONTRAST  LOCATION: [facility]  DATE: 11/18/2024    INDICATION: Abdominal pain.  COMPARISON: None available.  TECHNIQUE: CT scan of the abdomen and pelvis was performed following injection of IV contrast. Multiplanar reformats were obtained. Dose reduction techniques were used.  CONTRAST: 75 mL Isovue-370.    FINDINGS:    LOWER CHEST: Large hiatal hernia. Atherosclerosis of coronary arteries and visualized thoracic aorta.    HEPATOBILIARY: Hepatomegaly and diffuse hepatic steatosis.    Gallbladder is normal.    No intrahepatic or intrahepatic biliary ductal dilatation.    PANCREAS: Enhances normally. No peripancreatic inflammatory fat  stranding.    SPLEEN: Enhances normally. Normal size.    ADRENAL GLANDS: Normal.    KIDNEYS: Both kidneys enhance symmetrically, without hydronephrosis. Mild cortical scarring at the anterior mid zone right kidney.    Tiny nonobstructing left renal stone. Low-attenuation subcentimeter renal lesion(s). These are compatible with small benign cysts and no specific imaging evaluation or follow-up is recommended.    Urinary bladder is unremarkable.    PELVIC ORGANS: Hysterectomy.    BOWEL: Acute diverticulitis of the mid sigmoid colon, with associated perforation, moderate to severe sigmoid wall thickening and moderate surrounding inflammatory fat stranding. Perforated gas maintenance relatively localized to the site of   diverticulitis, though does not appear distinctly contained. No evidence of abscess. Tiny intramural lipoma of the duodenal sweep. Normal appendix.    LYMPH NODES: No suspicious abdominal or pelvic lymphadenopathy.    VASCULATURE: No abdominal aortic aneurysm. Focal ectasia of the infrarenal abdominal aorta, measuring up to 2.8 cm. Moderate to severe atheromatous disease of the abdominal aorta and branch vessels.    MUSCULOSKELETAL: No suspicious abnormality.    OTHER: No additionally significant abnormalities.      Impression    IMPRESSION:   1.  Acute, perforated sigmoid diverticulitis. The perforated gas is relatively localized to the site of acute diverticulitis, though does not appear particularly contained; surgical consultation recommended. No evidence of abscess. Colonoscopy follow-up   is additionally recommended, given the associated wall thickening.  2.  Hepatomegaly and diffuse hepatic steatosis.  3.  Large hiatal hernia.  4.  Tiny nonobstructing left renal stone.      Critical Result: Pneumoperitoneum/bowel perforation    Finding was identified on 11/18/2024 1:28 AM CST.    Dr. Baird was contacted by me on 11/18/2024 1:30 AM CST and verbalized understanding of the critical result.

## 2024-11-18 NOTE — ED PROVIDER NOTES
EMERGENCY DEPARTMENT ENCOUnter      NAME: Alexandra Mckeon  AGE: 74 year old female  YOB: 1950  MRN: [id number]  EVALUATION DATE & TIME: 11/17/2024 11:17 PM    PCP: No primary care provider on file.    ED PROVIDER: Elaine Chavez MD      Chief Complaint   Patient presents with    Generalized Weakness    Abdominal Pain         FINAL IMPRESSION:  1. Diverticulitis of colon with perforation    2. Acute sepsis (H)          ED COURSE & MEDICAL DECISION MAKING:      In summary, the patient is a 74-year-old female who presents to the emergency department for evaluation of generalized weakness thought secondary to sepsis.  Her sepsis is secondary to diverticulitis.  Her diverticulitis is complicated with a localized perforation.  We will admit to the hospital for further care and evaluation.  2324-I met with the patient, obtained history, performed an initial exam, and discussed options and plan for diagnostics and treatment here in the ED. normal saline 30 mL/kg IV was administered for IV hydration.  Tylenol 650 mg p.o. was administered for antipyresis. Meropenem 1 g IV was administered for antibiotic therapy.  0130-updated and rechecked patient  Discussed case with general surgery, Dr. Lopez, and surgery will see patient later today  Discussed case with [facility] service, Dr. Crowley, and she accepts patient for admission    Medical Decision Making  Obtained supplemental history:Supplemental history obtained?: Documented in chart, Family Member/Significant Other, and EMS  Reviewed external records: External records reviewed?: Documented in chart  Care impacted by chronic illness:Hypertension  Did you consider but not order tests?: Work up considered but not performed and documented in chart, if applicable  Did you interpret images independently?: Independent interpretation of ECG and images noted in documentation, when applicable.  Consultation discussion with other provider:Did you  involve another provider (consultant, , pharmacy, etc.)?: I discussed the care with another health care provider, see documentation for details.  Admit.    MIPS: Not Applicable      Critical Care     Performed by: Dr Elaine Chavez    Total critical care time: 30 minutes  Critical care was necessary to treat or prevent imminent or life-threatening deterioration of the following conditions: sepsis from diverticulitis with perforation  Critical care was time spent personally by me on the following activities: development of treatment plan with patient or surrogate, discussions with consultants, examination of patient, evaluation of patient's response to treatment, obtaining history from patient or surrogate, ordering and performing treatments and interventions, ordering and review of laboratory studies, ordering and review of radiographic studies, re-evaluation of patient's condition and monitoring for potential decompensation.  Critical care time was exclusive of separately billable procedures and treating other patients.      At the conclusion of the encounter I discussed the results of all of the tests and the disposition. The questions were answered. The patient or family acknowledged understanding and was agreeable with the care plan.         MEDICATIONS GIVEN IN THE EMERGENCY:  Medications   sodium chloride 0.9 % infusion (has no administration in time range)   acetaminophen (TYLENOL) tablet 650 mg (650 mg Oral $Given 11/18/24 0010)     Or   acetaminophen (TYLENOL) Suppository 650 mg ( Rectal See Alternative 11/18/24 0010)   sodium chloride 0.9% BOLUS 2,244 mL (2,000 mLs Intravenous $New Bag 11/17/24 2358)   meropenem (MERREM) 1 g vial to attach to  mL bag (0 g Intravenous Stopped 11/18/24 0048)   calcium carbonate (TUMS) chewable tablet 1,000 mg (1,000 mg Oral $Given 11/18/24 0011)   iopamidol (ISOVUE-370) solution 100 mL (75 mLs Intravenous $Given 11/18/24 0054)   alum & mag hydroxide-simethicone  (MAALOX) suspension 15 mL (15 mLs Oral $Given 11/18/24 0146)       NEW PRESCRIPTIONS STARTED AT TODAY'S ER VISIT  New Prescriptions    No medications on file          =================================================================    HPI        Alexandra Mckeon is a 74 year old female with a pertinent history of COPD and HTN who presents to this ED via walk-in for evaluation of weakness.    The patient lives with her son whom had left work around 2 PM today. He returned from work around 10 PM this evening and found her half sitting in and out of the chair with all the lights turned off. He called EMS and the patient reported to them that she did not recall what happened throughout the day. Her daughter was with her this morning and notes she was behaving at baseline. The patient reports of lower abdominal pain for the past couple of days and has been passing loose stools today. She rates her abdominal pain a 3/10. She endorses chills. She is on hypertensive medications. No complaints of chest pain, nausea, vomiting, or any other associated symptoms at this time.      REVIEW OF SYSTEMS     Constitutional:  Denies fever or chills  HENT:  Denies sore throat   Respiratory:  Denies cough or shortness of breath   Cardiovascular:  Denies chest pain or palpitations  GI:  Denies nausea, or vomiting. Positive for lower abdominal pain and diarrhea.  Musculoskeletal:  Denies any new extremity pain   Skin:  Denies rash   Neurologic:  Denies headache, focal weakness or sensory changes    All other systems reviewed and are negative      PAST MEDICAL HISTORY:  COPD, Hypertension    PAST SURGICAL HISTORY:  No past surgical history on file.        CURRENT MEDICATIONS:    No current outpatient medications on file.      ALLERGIES:  Allergies   Allergen Reactions    Aspirin Hives    Cephalexin Hives    Ibuprofen Hives    Penicillin V Hives    Penicillins Hives    Potassium Hives       FAMILY HISTORY:  No family history on  "file.    SOCIAL HISTORY:   Social History     Socioeconomic History    Marital status:        VITALS:  Patient Vitals for the past 24 hrs:   BP Temp Temp src Pulse Resp SpO2 Height Weight   11/18/24 0200 (!) 145/81 -- -- 101 -- 91 % -- --   11/18/24 0115 -- (!) 101  F (38.3  C) Oral 107 (!) 34 (!) 88 % -- --   11/18/24 0106 105/60 -- -- 109 -- -- -- --   11/18/24 0030 120/69 -- -- 109 -- 91 % -- --   11/18/24 0010 -- (!) 101.5  F (38.6  C) -- -- -- -- -- --   11/18/24 0000 126/75 -- -- 114 -- -- -- --   11/17/24 2345 131/72 -- -- 118 -- 91 % -- --   11/17/24 2335 -- (!) 101.5  F (38.6  C) Oral -- -- -- -- --   11/17/24 2331 132/87 (!) 101.5  F (38.6  C) Oral 120 24 93 % 1.575 m (5' 2\") 74.8 kg (165 lb)       PHYSICAL EXAM    Constitutional:  Well developed, Well nourished,  HENT:  Normocephalic, Atraumatic, Bilateral external ears normal, Oropharynx dry, Nose normal.   Neck:  Normal range of motion, No meningismus, No stridor.   Eyes:  EOMI, Conjunctiva normal, No discharge.   Respiratory:  Normal breath sounds, No respiratory distress, No wheezing, No chest tenderness.   Cardiovascular:  Normal heart rate, Normal rhythm, No murmurs  GI:  Soft, diffuse tenderness, No guarding, No CVA tenderness.   Musculoskeletal:  Neurovascularly intact distally, No edema, No tenderness, No cyanosis, Good range of motion in all major joints.   Integument:  Warm, Dry, No erythema, No rash.   Lymphatic:  No lymphadenopathy noted.   Neurologic:  Alert & oriented x 3, Normal motor function, Normal sensory function, No focal deficits noted.   Psychiatric:  Affect normal, Judgment normal, Mood normal.      LAB:  All pertinent labs reviewed and interpreted.  Results for orders placed or performed during the hospital encounter of 11/17/24   CT Abdomen Pelvis w Contrast    Impression    IMPRESSION:   1.  Acute, perforated sigmoid diverticulitis. The perforated gas is relatively localized to the site of acute diverticulitis, though " does not appear particularly contained; surgical consultation recommended. No evidence of abscess. Colonoscopy follow-up   is additionally recommended, given the associated wall thickening.  2.  Hepatomegaly and diffuse hepatic steatosis.  3.  Large hiatal hernia.  4.  Tiny nonobstructing left renal stone.      Critical Result: Pneumoperitoneum/bowel perforation    Finding was identified on 11/18/2024 1:28 AM CST.    Dr. Baird was contacted by me on 11/18/2024 1:30 AM CST and verbalized understanding of the critical result.        XR Chest Port 1 View    Impression    IMPRESSION: No acute airspace consolidation. No pleural effusion or pneumothorax. Chin soft tissues obscure both lung apices.    Upper limits of normal heart size. Atherosclerotic calcifications of the thoracic aorta.   Result Value Ref Range    INR 1.21 (H) 0.85 - 1.15   Comprehensive metabolic panel   Result Value Ref Range    Sodium 138 135 - 145 mmol/L    Potassium 4.0 3.4 - 5.3 mmol/L    Carbon Dioxide (CO2) 24 22 - 29 mmol/L    Anion Gap 15 7 - 15 mmol/L    Urea Nitrogen 17.0 8.0 - 23.0 mg/dL    Creatinine 1.20 (H) 0.51 - 0.95 mg/dL    GFR Estimate 47 (L) >60 mL/min/1.73m2    Calcium 10.2 8.8 - 10.4 mg/dL    Chloride 99 98 - 107 mmol/L    Glucose 162 (H) 70 - 99 mg/dL    Alkaline Phosphatase 86 40 - 150 U/L    AST 24 0 - 45 U/L    ALT 16 0 - 50 U/L    Protein Total 7.7 6.4 - 8.3 g/dL    Albumin 4.2 3.5 - 5.2 g/dL    Bilirubin Total 1.0 <=1.2 mg/dL   Lactic acid whole blood with 1x repeat in 2 hr when >2   Result Value Ref Range    Lactic Acid, Initial 1.5 0.7 - 2.0 mmol/L   Result Value Ref Range    Troponin T, High Sensitivity 14 <=14 ng/L   Influenza A/B, RSV, & SARS-CoV2 PCR (COVID-19) Nasopharyngeal    Specimen: Nasopharyngeal; Swab   Result Value Ref Range    Influenza A PCR Negative Negative    Influenza B PCR Negative Negative    RSV PCR Negative Negative    SARS CoV2 PCR Negative Negative   UA with Microscopic reflex to Culture     Specimen: Urine, Midstream   Result Value Ref Range    Color Urine Yellow Colorless, Straw, Light Yellow, Yellow    Appearance Urine Clear Clear    Glucose Urine Negative Negative mg/dL    Bilirubin Urine Negative Negative    Ketones Urine 10 (A) Negative mg/dL    Specific Gravity Urine 1.022 1.001 - 1.030    Blood Urine 0.1 mg/dL (A) Negative    pH Urine 6.5 5.0 - 7.0    Protein Albumin Urine 30 (A) Negative mg/dL    Urobilinogen Urine <2.0 <2.0 mg/dL    Nitrite Urine Negative Negative    Leukocyte Esterase Urine 25 Aneesh/uL (A) Negative    RBC Urine 3 (H) <=2 /HPF    WBC Urine 3 <=5 /HPF    Squamous Epithelials Urine 1 <=1 /HPF   Result Value Ref Range    Procalcitonin 0.41 <0.50 ng/mL   CBC with platelets and differential   Result Value Ref Range    WBC Count 19.6 (H) 4.0 - 11.0 10e3/uL    RBC Count 4.48 3.80 - 5.20 10e6/uL    Hemoglobin 13.3 11.7 - 15.7 g/dL    Hematocrit 39.3 35.0 - 47.0 %    MCV 88 78 - 100 fL    MCH 29.7 26.5 - 33.0 pg    MCHC 33.8 31.5 - 36.5 g/dL    RDW 12.5 10.0 - 15.0 %    Platelet Count 207 150 - 450 10e3/uL    % Neutrophils 86 %    % Lymphocytes 5 %    % Monocytes 8 %    % Eosinophils 0 %    % Basophils 0 %    % Immature Granulocytes 1 %    NRBCs per 100 WBC 0 <1 /100    Absolute Neutrophils 16.8 (H) 1.6 - 8.3 10e3/uL    Absolute Lymphocytes 1.0 0.8 - 5.3 10e3/uL    Absolute Monocytes 1.5 (H) 0.0 - 1.3 10e3/uL    Absolute Eosinophils 0.0 0.0 - 0.7 10e3/uL    Absolute Basophils 0.0 0.0 - 0.2 10e3/uL    Absolute Immature Granulocytes 0.2 <=0.4 10e3/uL    Absolute NRBCs 0.0 10e3/uL   Result Value Ref Range    Troponin T, High Sensitivity 15 (H) <=14 ng/L       RADIOLOGY:  I have independently reviewed and interpreted the above imaging, pending the final radiology read.  CT Abdomen Pelvis w Contrast   Final Result   IMPRESSION:    1.  Acute, perforated sigmoid diverticulitis. The perforated gas is relatively localized to the site of acute diverticulitis, though does not appear particularly  contained; surgical consultation recommended. No evidence of abscess. Colonoscopy follow-up    is additionally recommended, given the associated wall thickening.   2.  Hepatomegaly and diffuse hepatic steatosis.   3.  Large hiatal hernia.   4.  Tiny nonobstructing left renal stone.         Critical Result: Pneumoperitoneum/bowel perforation      Finding was identified on 2024 1:28 AM CST.      Dr. Baird was contacted by me on 2024 1:30 AM CST and verbalized understanding of the critical result.             XR Chest Port 1 View   Final Result   IMPRESSION: No acute airspace consolidation. No pleural effusion or pneumothorax. Chin soft tissues obscure both lung apices.      Upper limits of normal heart size. Atherosclerotic calcifications of the thoracic aorta.          EK-rate is 121, sinus, no st segment elevation or depression  I have independently reviewed and interpreted this EKG          I, Tito Bateman, am serving as a scribe to document services personally performed by Dr. Chavez based on my observation and the provider's statements to me. I, Elaine Chavez MD attest that Tito Bateman is acting in a scribe capacity, has observed my performance of the services and has documented them in accordance with my direction.    Elaine Chavez MD  Emergency Medicine  [facility] EMERGENCY ROOM  [address] [address] [phone]  Dept: [phone]     Elaine Chavez MD  24 [phone]

## 2024-11-18 NOTE — CONSULTS
HPI  74 year old year old female who I have been consulted by No ref. provider found for evaluation of Generalized Weakness and Abdominal Pain   Two week history of intermittent left lower quadrant pain, diarrhea and constipation. Presented to ER for worsening abdominal pain.  Underwent CT imaging which demonstrated acute diverticulitis with small perforation, contained.  Has had previous episode of diverticulitis in the past.  Currently feels better this morning but thirsty.       Allergies:Aspirin, Cephalexin, Ibuprofen, Penicillin v, Penicillins, and Potassium    Past Medical History:   Diagnosis Date    COPD (chronic obstructive pulmonary disease) (H)     HLD (hyperlipidemia)     Hypertension        No past surgical history on file.      CURRENT MEDS:    Current Facility-Administered Medications:     acetaminophen (TYLENOL) tablet 650 mg, 650 mg, Oral, Q4H PRN **OR** acetaminophen (TYLENOL) Suppository 650 mg, 650 mg, Rectal, Q4H PRN, Criselda Crowley MD    acetaminophen (TYLENOL) tablet 325 mg, 325 mg, Oral, Q4H PRN, Bismark Lopez,     alum & mag hydroxide-simethicone (MAALOX) suspension 30 mL, 30 mL, Oral, Q4H PRN, Criselda Crowley MD, 30 mL at 11/18/24 0325    calcium carbonate (TUMS) chewable tablet 1,000 mg, 1,000 mg, Oral, 4x Daily PRN, Criselda Crowley MD    lidocaine (LMX4) cream, , Topical, Q1H PRN, Criselda Crowley MD    lidocaine 1 % 0.1-1 mL, 0.1-1 mL, Other, Q1H PRN, Criselda Crowley MD    meropenem (MERREM) 1 g vial to attach to  mL bag, 1 g, Intravenous, Q12H, Criselda Crowley MD    ondansetron (ZOFRAN ODT) ODT tab 4 mg, 4 mg, Oral, Q6H PRN **OR** ondansetron (ZOFRAN) injection 4 mg, 4 mg, Intravenous, Q6H PRN, Criselda Crowley MD    polyethylene glycol (MIRALAX) Packet 17 g, 17 g, Oral, BID PRN, Criselda Crowley MD    prochlorperazine (COMPAZINE) injection 5 mg, 5 mg, Intravenous, Q6H PRN **OR** prochlorperazine (COMPAZINE) tablet 5 mg, 5 mg, Oral, Q6H PRN, Criselda Crowley MD     "senna-docusate (SENOKOT-S/PERICOLACE) 8.6-50 MG per tablet 1 tablet, 1 tablet, Oral, BID PRN **OR** senna-docusate (SENOKOT-S/PERICOLACE) 8.6-50 MG per tablet 2 tablet, 2 tablet, Oral, BID PRN, Criselda Crowley MD    sodium chloride (PF) 0.9% PF flush 3 mL, 3 mL, Intracatheter, Q8H, Criselda Crowley MD    sodium chloride (PF) 0.9% PF flush 3 mL, 3 mL, Intracatheter, q1 min prn, Criselda Crowley MD    sodium chloride 0.9 % infusion, , Intravenous, Continuous, Criselda Crowley MD, Last Rate: 150 mL/hr at 11/18/24 0340, Rate Verify at 11/18/24 0340    FAMX-reviewed     reports that she quit smoking about 5 years ago. Her smoking use included cigarettes. She started smoking about 54 years ago. She has a 49 pack-year smoking history. She does not have any smokeless tobacco history on file.    Review of Systems:  The 12 point review of systems  is within normal limits except for as mentioned above in the HPI.  General ROS: No complaints or constitutional symptoms  Ophthalmic ROS: No complaints of visual changes  Skin: No complaints or symptoms   Endocrine: No complaints or symptoms  Hematologic/Lymphatic: No symptoms or complaints  Psychiatric: No symptoms or complaints  Respiratory ROS: no cough, shortness of breath, or wheezing  Cardiovascular ROS: no chest pain or dyspnea on exertion  Gastrointestinal ROS: As per HPI  Genito-Urinary ROS: no dysuria, trouble voiding, or hematuria  Musculoskeletal ROS: no joint or muscle pain  Neurological ROS: no TIA or stroke symptoms      EXAM:  BP (!) 147/77 (BP Location: Right arm, Patient Position: Semi-Millan's, Cuff Size: Adult Regular)   Pulse 102   Temp 98.2  F (36.8  C) (Oral)   Resp 30   Ht 1.575 m (5' 2\")   Wt 76.4 kg (168 lb 6.9 oz)   SpO2 91%   BMI 30.81 kg/m    GENERAL: Well developed female, No acute distress, pleasant and conversant   EYES: Pupils equal, round and reactive, no scleral icterus  ABDOMEN: soft, mild distention, ttp in luq and llq  SKIN: Pink, warm " and dry, no obvious rashes or lesions   NEURO:No focal deficits, ambulatory  MUSCULOSKELETAL:No obvious deformities, no swelling, normal appearing      LABS:  Lab Results   Component Value Date    WBC 19.6 11/17/2024    HGB 13.3 11/17/2024    HCT 39.3 11/17/2024    MCV 88 11/17/2024     11/17/2024     INR/Prothrombin Time  Recent Labs   Lab 11/17/24  2357      CO2 24   BUN 17.0     Lab Results   Component Value Date    ALT 16 11/17/2024    AST 24 11/17/2024    ALKPHOS 86 11/17/2024       IMAGES:   Relevant images were reviewed and discussed with the patient.  Notable findings were: ct images reviewed    Assessment/Plan:   Alexandra Mckeon is a 74 year old female with diverticulitis-perforation at site of diverticuli pericolonic inflammation. No abscess.   -continue with iv fluid resuscitation and iv abx  -ok for ice chips and oral meds  -no surgery planned for now  -will follow      Norman Lopez D.O. FACS  [phone]

## 2024-11-18 NOTE — PLAN OF CARE
Problem: Adult Inpatient Plan of Care  Goal: Absence of Hospital-Acquired Illness or Injury  Intervention: Identify and Manage Fall Risk  Recent Flowsheet Documentation  Taken 11/18/2024 0401 by Beatriz Tolbert RN  Safety Promotion/Fall Prevention:   safety round/check completed   supervised activity  Taken 11/18/2024 0325 by Beatriz Tolbert RN  Safety Promotion/Fall Prevention: safety round/check completed     Problem: Adult Inpatient Plan of Care  Goal: Absence of Hospital-Acquired Illness or Injury  Intervention: Prevent Infection  Recent Flowsheet Documentation  Taken 11/18/2024 0325 by Beatriz Tolbert RN  Infection Prevention:   single patient room provided   rest/sleep promoted   hand hygiene promoted   equipment surfaces disinfected     Problem: Adult Inpatient Plan of Care  Goal: Optimal Comfort and Wellbeing  11/18/2024 0544 by Beatriz Tolbert RN  Outcome: Progressing  11/18/2024 0543 by Beatriz Tolbert RN  Outcome: Progressing   Goal Outcome Evaluation:    Pt A&Ox4, can be forgetful. Had fever upon admission to the floor, fever free at this time. Other VSS at this time, on RA. SOB with exertion. Pt having diarrhea. X1 episode of diarrheal incontinence. Voids spontaneously. Minimal lower abdominal pain, no intervention requested. Assist x1 GB walker. NS running @150mL/hr. Bed in lowest position, alarm on, call light within reach, pt calls appropriately.

## 2024-11-18 NOTE — ED NOTES
Bed: [facility]-10  Expected date: 11/17/24  Expected time: 11:09 PM  Means of arrival: Ambulance  Comments:  [facility]  EMS  73 y/o F   Increased weakness   Bp: 143/88  HR: 120  SPO2: 93% RA  Blood Glucose: 172

## 2024-11-18 NOTE — ED NOTES
AIDET performed, white board updated for rounding. Patient updated on plan of care. Patient's pain assessed. Call light within reach, bed in low position, side rails up.  Pt has son and daughter n law at bedside

## 2024-11-18 NOTE — PLAN OF CARE
Problem: Adult Inpatient Plan of Care  Goal: Optimal Comfort and Wellbeing  Intervention: Monitor Pain and Promote Comfort  Recent Flowsheet Documentation  Taken 11/18/2024 0842 by Nati Phan, RN  Pain Management Interventions: medication (see MAR)    Goal Outcome Evaluation:    Patient is A & O x 4. VSS. SHEPHERD; 1 L of oxygen applied to maintain saturations > 90 %. SOB; PRN albuterol inhaler utilized with goof relief. Abdominal pain minimal between 1-3. PRN Tylenol provided for temp of 100.2 with good relief. Heartburn; Maalox provided with good relief. Surgery consulted and assessing symptoms at this time with bowel rest. Antibiotics changed to PO Cipro & Flagyl. Bowel sounds hypoactive. Denies tenderness on palpation. Denies stomach pressure or fullness. Increased loose BM's. Endorses heartburn. PRN Maalox provided with good relief. IV continues with NS @ 150 mL/h. NPO except for ice chips and meds. Assist of 1 with GBW. Alarms on for safety. Call light within reach and patient does use appropriately.     Discharge pending ongoing clinical improvement. Resides at home with son.    HLM Admission: 7- Walk 25 feet or more  HLM Daily6- Walk 10 steps or more      2      Problem: Infection  Goal: Absence of Infection Signs and Symptoms  Outcome: Progressing     Problem: Gas Exchange Impaired  Goal: Optimal Gas Exchange  Intervention: Optimize Oxygenation and Ventilation  Recent Flowsheet Documentation  Taken 11/18/2024 1119 by Nati Phan, RN  Head of Bed (HOB) Positioning: HOB at 30 degrees     Problem: Comorbidity Management  Goal: Maintenance of COPD Symptom Control  Intervention: Maintain COPD Symptom Control  Recent Flowsheet Documentation  Taken 11/18/2024 0852 by Nati Phan, RN  Medication Review/Management: medications reviewed

## 2024-11-18 NOTE — ED NOTES
Family is still at bedside, call light with in reach, bed in low position, side rails are up.  Will continue to monitor

## 2024-11-19 ENCOUNTER — APPOINTMENT (OUTPATIENT)
Dept: OCCUPATIONAL THERAPY | Facility: CLINIC | Age: 74
End: 2024-11-19
Payer: COMMERCIAL

## 2024-11-19 ENCOUNTER — APPOINTMENT (OUTPATIENT)
Dept: PHYSICAL THERAPY | Facility: CLINIC | Age: 74
End: 2024-11-19
Payer: COMMERCIAL

## 2024-11-19 LAB
ANION GAP SERPL CALCULATED.3IONS-SCNC: 14 MMOL/L (ref 7–15)
BUN SERPL-MCNC: 16 MG/DL (ref 8–23)
CALCIUM SERPL-MCNC: 8.9 MG/DL (ref 8.8–10.4)
CHLORIDE SERPL-SCNC: 111 MMOL/L (ref 98–107)
CREAT SERPL-MCNC: 0.93 MG/DL (ref 0.51–0.95)
EGFRCR SERPLBLD CKD-EPI 2021: 64 ML/MIN/1.73M2
ERYTHROCYTE [DISTWIDTH] IN BLOOD BY AUTOMATED COUNT: 13 % (ref 10–15)
GLUCOSE SERPL-MCNC: 100 MG/DL (ref 70–99)
HCO3 SERPL-SCNC: 19 MMOL/L (ref 22–29)
HCT VFR BLD AUTO: 33.4 % (ref 35–47)
HGB BLD-MCNC: 10.9 G/DL (ref 11.7–15.7)
MCH RBC QN AUTO: 29.8 PG (ref 26.5–33)
MCHC RBC AUTO-ENTMCNC: 32.6 G/DL (ref 31.5–36.5)
MCV RBC AUTO: 91 FL (ref 78–100)
PLATELET # BLD AUTO: 170 10E3/UL (ref 150–450)
POTASSIUM SERPL-SCNC: 3.4 MMOL/L (ref 3.4–5.3)
RBC # BLD AUTO: 3.66 10E6/UL (ref 3.8–5.2)
SODIUM SERPL-SCNC: 144 MMOL/L (ref 135–145)
WBC # BLD AUTO: 13.5 10E3/UL (ref 4–11)

## 2024-11-19 PROCEDURE — 250N000013 HC RX MED GY IP 250 OP 250 PS 637

## 2024-11-19 PROCEDURE — 85041 AUTOMATED RBC COUNT: CPT

## 2024-11-19 PROCEDURE — 85014 HEMATOCRIT: CPT

## 2024-11-19 PROCEDURE — 97162 PT EVAL MOD COMPLEX 30 MIN: CPT | Mod: GP

## 2024-11-19 PROCEDURE — 99231 SBSQ HOSP IP/OBS SF/LOW 25: CPT | Mod: FS | Performed by: SURGERY

## 2024-11-19 PROCEDURE — 99232 SBSQ HOSP IP/OBS MODERATE 35: CPT | Mod: GC | Performed by: STUDENT IN AN ORGANIZED HEALTH CARE EDUCATION/TRAINING PROGRAM

## 2024-11-19 PROCEDURE — 80048 BASIC METABOLIC PNL TOTAL CA: CPT

## 2024-11-19 PROCEDURE — 250N000011 HC RX IP 250 OP 636: Performed by: PHYSICIAN ASSISTANT

## 2024-11-19 PROCEDURE — 99207 PR NO BILLABLE SERVICE THIS VISIT: CPT | Performed by: PHYSICIAN ASSISTANT

## 2024-11-19 PROCEDURE — 97166 OT EVAL MOD COMPLEX 45 MIN: CPT | Mod: GO

## 2024-11-19 PROCEDURE — 97535 SELF CARE MNGMENT TRAINING: CPT | Mod: GO

## 2024-11-19 PROCEDURE — 97530 THERAPEUTIC ACTIVITIES: CPT | Mod: GP

## 2024-11-19 PROCEDURE — 250N000013 HC RX MED GY IP 250 OP 250 PS 637: Performed by: SURGERY

## 2024-11-19 PROCEDURE — 36415 COLL VENOUS BLD VENIPUNCTURE: CPT

## 2024-11-19 PROCEDURE — 97116 GAIT TRAINING THERAPY: CPT | Mod: GP

## 2024-11-19 PROCEDURE — 120N000001 HC R&B MED SURG/OB

## 2024-11-19 RX ORDER — LANOLIN ALCOHOL/MO/W.PET/CERES
1000 CREAM (GRAM) TOPICAL DAILY
Status: DISCONTINUED | OUTPATIENT
Start: 2024-11-19 | End: 2024-11-20 | Stop reason: HOSPADM

## 2024-11-19 RX ORDER — CIPROFLOXACIN 500 MG/1
500 TABLET, FILM COATED ORAL EVERY 12 HOURS SCHEDULED
Status: DISCONTINUED | OUTPATIENT
Start: 2024-11-19 | End: 2024-11-20 | Stop reason: HOSPADM

## 2024-11-19 RX ORDER — CIPROFLOXACIN 2 MG/ML
400 INJECTION, SOLUTION INTRAVENOUS EVERY 12 HOURS
Status: DISCONTINUED | OUTPATIENT
Start: 2024-11-19 | End: 2024-11-19

## 2024-11-19 RX ORDER — CALCIUM CARBONATE 500 MG/1
500 TABLET, CHEWABLE ORAL 4 TIMES DAILY PRN
Status: DISCONTINUED | OUTPATIENT
Start: 2024-11-19 | End: 2024-11-20 | Stop reason: HOSPADM

## 2024-11-19 RX ORDER — METRONIDAZOLE 500 MG/100ML
500 INJECTION, SOLUTION INTRAVENOUS EVERY 8 HOURS
Status: DISCONTINUED | OUTPATIENT
Start: 2024-11-19 | End: 2024-11-20

## 2024-11-19 RX ORDER — METRONIDAZOLE 500 MG/100ML
500 INJECTION, SOLUTION INTRAVENOUS EVERY 12 HOURS
Status: DISCONTINUED | OUTPATIENT
Start: 2024-11-19 | End: 2024-11-19

## 2024-11-19 RX ORDER — FAMOTIDINE 20 MG/1
20 TABLET, FILM COATED ORAL DAILY
Status: DISCONTINUED | OUTPATIENT
Start: 2024-11-19 | End: 2024-11-20 | Stop reason: HOSPADM

## 2024-11-19 RX ADMIN — CYANOCOBALAMIN TAB 1000 MCG 1000 MCG: 1000 TAB at 10:06

## 2024-11-19 RX ADMIN — CIPROFLOXACIN HYDROCHLORIDE 500 MG: 500 TABLET, FILM COATED ORAL at 20:44

## 2024-11-19 RX ADMIN — FAMOTIDINE 20 MG: 20 TABLET, FILM COATED ORAL at 10:06

## 2024-11-19 RX ADMIN — METRONIDAZOLE 500 MG: 500 INJECTION, SOLUTION INTRAVENOUS at 08:18

## 2024-11-19 RX ADMIN — SIMVASTATIN 20 MG: 20 TABLET, FILM COATED ORAL at 20:44

## 2024-11-19 RX ADMIN — ACETAMINOPHEN 325 MG: 325 TABLET ORAL at 08:13

## 2024-11-19 RX ADMIN — ACETAMINOPHEN 650 MG: 325 TABLET ORAL at 15:03

## 2024-11-19 RX ADMIN — ACETAMINOPHEN 325 MG: 325 TABLET ORAL at 21:02

## 2024-11-19 RX ADMIN — METRONIDAZOLE 500 MG: 500 INJECTION, SOLUTION INTRAVENOUS at 17:04

## 2024-11-19 RX ADMIN — LOSARTAN POTASSIUM 25 MG: 25 TABLET, FILM COATED ORAL at 08:14

## 2024-11-19 RX ADMIN — CIPROFLOXACIN 400 MG: 400 INJECTION, SOLUTION INTRAVENOUS at 10:06

## 2024-11-19 ASSESSMENT — ACTIVITIES OF DAILY LIVING (ADL)
ADLS_ACUITY_SCORE: 0
PREVIOUS_RESPONSIBILITIES: MEAL PREP;HOUSEKEEPING;LAUNDRY;SHOPPING;MEDICATION MANAGEMENT;FINANCES;DRIVING
ADLS_ACUITY_SCORE: 0

## 2024-11-19 NOTE — PROGRESS NOTES
General Surgery Progress Note:    Hospital Day # 1    ASSESSMENT:   1. Diverticulitis of colon with perforation    2. Acute sepsis (H)        Alexandra Mckeon is a 74 year old female who was admitted with cute diverticulitis with perforation and without evidence of abscess.  Afebrile x 24 hours and hemodynamically stable.  Labs with improvement in leukocytosis (WBC 13.5 <19.6).  Patient continues to have left lower quadrant abdominal tenderness though improved compared to yesterday.  Will slowly advance to clear liquid diet and continue IV antibiotics today.    PLAN:   -Clear liquid diet.  Encourage patient to go slow with p.o. intake  -IV antibiotics  -Increase activity and encourage ambulation as able  -Medical management per primary team  -General Surgery will continue to follow        Physician Attestation     I saw and evaluated Alexandra Mckeon as part of a shared APRN/PA visit.     I personally reviewed the vital signs, medications, labs, and imaging.    I personally provided a substantive portion of care for this patient and I approve the care plan as written by the IRAJ.  I was involved with Medical Decision Making including:   Feeling better today. Tolerating clear liquids  Abd- soft, mild ttp in llq, no peritoneal findings  A/P- diverticulitis  -clears for now  -can convert to oral abx  -likely adat tomorrow to low fiber diet.   -surgery will follow    Bismark Lopez DO  Date of Service (when I saw the patient): 11/19/24      SUBJECTIVE:   Alexandra Mckeon is doing okay this morning.  Reports her abdominal pain is a little bit better today.  Continues to endorse left lower quadrant discomfort.  Denies fever, chills, nausea, vomiting.  Endorses passing frequent loose stools.  Seen sitting up in the chair this morning and anxious to have liquids to drink.    Patient Vitals for the past 24 hrs:   BP Temp Temp src Pulse Resp SpO2 Weight   11/19/24 0804 116/61 98.6  F (37  C) Oral 89 20 (!) 89 % --   11/19/24 0500 --  -- -- -- -- -- 73.9 kg (163 lb)   11/19/24 0039 (!) 145/67 98.6  F (37  C) Oral 95 20 96 % --   11/18/24 1626 -- -- -- -- -- 92 % --   11/18/24 1602 120/59 98.8  F (37.1  C) Oral 98 20 92 % --   11/18/24 1548 -- -- -- -- -- 90 % --   11/18/24 1509 -- -- -- -- -- 92 % --   11/18/24 1450 -- -- -- -- -- (!) 88 % --   11/18/24 1442 -- -- -- -- -- (!) 87 % --   11/18/24 1323 -- 98.7  F (37.1  C) -- -- -- -- --       Physical Exam:  General: patient seen resting in bed, no acute distress  Resp: no respiratory distress, breathing comfortably on 2L via nasal canula  Abdomen: Softly distended, tender to palpation over left lower quadrant.  No rebound or guarding.  Extremities: warm and well perfused    Admission on 11/17/2024   Component Date Value    Ventricular Rate 11/17/2024 121     Atrial Rate 11/17/2024 121     MN Interval 11/17/2024 152     QRS Duration 11/17/2024 82     QT 11/17/2024 320     QTc 11/17/2024 454     P Axis 11/17/2024 67     R AXIS 11/17/2024 58     T [place] 11/17/2024 58     Interpretation ECG 11/17/2024                      Value:Sinus tachycardia  Otherwise normal ECG  No previous ECGs available  Confirmed by SEE ED PROVIDER NOTE FOR, ECG INTERPRETATION (4000),  Belén Calvillo ([id number]) on 11/18/2024 2:41:43 AM      INR 11/17/2024 1.21 (H)     Sodium 11/17/2024 138     Potassium 11/17/2024 4.0     Carbon Dioxide (CO2) 11/17/2024 24     Anion Gap 11/17/2024 15     Urea Nitrogen 11/17/2024 17.0     Creatinine 11/17/2024 1.20 (H)     GFR Estimate 11/17/2024 47 (L)     Calcium 11/17/2024 10.2     Chloride 11/17/2024 99     Glucose 11/17/2024 162 (H)     Alkaline Phosphatase 11/17/2024 86     AST 11/17/2024 24     ALT 11/17/2024 16     Protein Total 11/17/2024 7.7     Albumin 11/17/2024 4.2     Bilirubin Total 11/17/2024 1.0     Lactic Acid, Initial 11/17/2024 1.5     Troponin T, High Sensiti* 11/17/2024 14     Influenza A PCR 11/17/2024 Negative     Influenza B PCR 11/17/2024 Negative     RSV PCR  11/17/2024 Negative     SARS CoV2 PCR 11/17/2024 Negative     Color Urine 11/17/2024 Yellow     Appearance Urine 11/17/2024 Clear     Glucose Urine 11/17/2024 Negative     Bilirubin Urine 11/17/2024 Negative     Ketones Urine 11/17/2024 10 (A)     Specific Gravity Urine 11/17/2024 1.022     Blood Urine 11/17/2024 0.1 mg/dL (A)     pH Urine 11/17/2024 6.5     Protein Albumin Urine 11/17/2024 30 (A)     Urobilinogen Urine 11/17/2024 <2.0     Nitrite Urine 11/17/2024 Negative     Leukocyte Esterase Urine 11/17/2024 25 Aneesh/uL (A)     RBC Urine 11/17/2024 3 (H)     WBC Urine 11/17/2024 3     Squamous Epithelials Uri* 11/17/2024 1     Procalcitonin 11/17/2024 0.41     Culture 11/17/2024 No growth after 1 day     WBC Count 11/17/2024 19.6 (H)     RBC Count 11/17/2024 4.48     Hemoglobin 11/17/2024 13.3     Hematocrit 11/17/2024 39.3     MCV 11/17/2024 88     MCH 11/17/2024 29.7     MCHC 11/17/2024 33.8     RDW 11/17/2024 12.5     Platelet Count 11/17/2024 207     % Neutrophils 11/17/2024 86     % Lymphocytes 11/17/2024 5     % Monocytes 11/17/2024 8     % Eosinophils 11/17/2024 0     % Basophils 11/17/2024 0     % Immature Granulocytes 11/17/2024 1     NRBCs per 100 WBC 11/17/2024 0     Absolute Neutrophils 11/17/2024 16.8 (H)     Absolute Lymphocytes 11/17/2024 1.0     Absolute Monocytes 11/17/2024 1.5 (H)     Absolute Eosinophils 11/17/2024 0.0     Absolute Basophils 11/17/2024 0.0     Absolute Immature Granul* 11/17/2024 0.2     Absolute NRBCs 11/17/2024 0.0     Troponin T, High Sensiti* 11/18/2024 15 (H)     Systolic Blood Pressure 11/18/2024 112     Diastolic Blood Pressure 11/18/2024 75     Ventricular Rate 11/18/2024 106     Atrial Rate 11/18/2024 106     GA Interval 11/18/2024 158     QRS Duration 11/18/2024 86     QT 11/18/2024 336     QTc 11/18/2024 446     P Axis 11/18/2024 60     R AXIS 11/18/2024 52     T Axis 11/18/2024 61     Interpretation ECG 11/18/2024                      Value:Sinus tachycardia with  occasional Premature ventricular complexes  Otherwise normal ECG  When compared with ECG of 17-Nov-2024 23:25,  Premature ventricular complexes are now Present  Confirmed by SEE ED PROVIDER NOTE FOR, ECG INTERPRETATION ([id number]),  Belén Calvillo ([id number]) on 11/18/2024 2:40:41 AM      WBC Count 11/19/2024 13.5 (H)     RBC Count 11/19/2024 3.66 (L)     Hemoglobin 11/19/2024 10.9 (L)     Hematocrit 11/19/2024 33.4 (L)     MCV 11/19/2024 91     MCH 11/19/2024 29.8     MCHC 11/19/2024 32.6     RDW 11/19/2024 13.0     Platelet Count 11/19/2024 170     Sodium 11/19/2024 144     Potassium 11/19/2024 3.4     Chloride 11/19/2024 111 (H)     Carbon Dioxide (CO2) 11/19/2024 19 (L)     Anion Gap 11/19/2024 14     Urea Nitrogen 11/19/2024 16.0     Creatinine 11/19/2024 0.93     GFR Estimate 11/19/2024 64     Calcium 11/19/2024 8.9     Glucose 11/19/2024 100 (H)         Denia Bonner PA-C  [facility] [address] [address]?  Office: [phone]

## 2024-11-19 NOTE — PLAN OF CARE
Problem: Gas Exchange Impaired  Goal: Optimal Gas Exchange  Outcome: Progressing   Goal Outcome Evaluation:       Patient denies shortness of breath at rest, some dyspnea on exertion per baseline. Per MD pickett for oxygen saturation 90% or above. Patient is 90-91% on room air. Patient ambulated in low several times today and up in chair.       Problem: Infection  Goal: Absence of Infection Signs and Symptoms  Outcome: Progressing        Vitals are stable, Patient tolerating Clear liquids, 2 loose stools during day shift. Abdomen soft, tender, pain 3/10, given tylenol with relief.

## 2024-11-19 NOTE — PROGRESS NOTES
11/19/24 1540   Appointment Info   Signing Clinician's Name / Credentials (OT) Leonid Liu, OTR/L   Living Environment   People in Home child(hector), adult  (Son)   Current Living Arrangements house  (Able to stay on main floor except for laundry in the basement.  Pt takes laundry down and Son brings laundry up.)   Living Environment Comments Tub/shower without GB or shower chair.  Std toilet but does have RTS in storage.  Support on either side of the toilet.   Self-Care   Usual Activity Tolerance good   Current Activity Tolerance fair   Instrumental Activities of Daily Living (IADL)   Previous Responsibilities meal prep;housekeeping;laundry;shopping;medication management;finances;driving   IADL Comments Son assists with laundry   General Information   Onset of Illness/Injury or Date of Surgery 11/17/24   Referring Physician Dr. Tita Raines   Patient/Family Therapy Goal Statement (OT) To get stronger   Additional Occupational Profile Info/Pertinent History of Current Problem Adm with acute sepsis due to diverticulitis of colon with perforation.  PMH:  CKD-3, COPD, HTN, Hyperlip.   Existing Precautions/Restrictions (S)  abdominal   Cognitive Status Examination   Orientation Status orientation to person, place and time   Follows Commands WNL   Visual Perception   Visual Impairment/Limitations corrective lenses for reading   Range of Motion Comprehensive   General Range of Motion no range of motion deficits identified   Comment, General Range of Motion Old injury to the R shoulder - ROM WFL   Bed Mobility   Bed Mobility   (Declined as Pt sleeps in a recliner.)   Transfers   Transfers sit-stand transfer;toilet transfer;shower transfer   Transfer Comments Pt's baseline is no assistive walking device.   Sit-Stand Transfer   Sit-Stand [place] (Transfers) contact guard   Assistive Device (Sit-Stand Transfers) walker, front-wheeled   Shower Transfer   Type (Shower Transfer) lateral   [place] Level (Shower  Transfer) contact guard;minimum assist (75% patient effort)   Assistive Device (Shower Transfer) walker, front-wheeled   Toilet Transfer   Type (Toilet Transfer) sit-stand;stand-sit   [place] Level (Toilet Transfer) contact guard   Assistive Device (Toilet Transfer) raised toilet seat;grab bars/safety frame   Balance   Balance Assessment standing dynamic balance   Standing Balance: Dynamic supervision;verbal cues   Position/Device Used, Standing Balance walker, front-wheeled   Activities of Daily Living   BADL Assessment/Intervention lower body dressing;grooming;toileting   Lower Body Dressing Assessment/Training   Position (Lower Body Dressing) unsupported sitting   [place] Level (Lower Body Dressing) lower body dressing skills;doff;don;socks;maximum assist (25% patient effort)   Grooming Assessment/Training   Position (Grooming) supported standing   [place] Level (Grooming) grooming skills;wash face, hands;supervision;verbal cues   Toileting   Position (Toileting) supported sitting;supported standing   Assistive Devices (Toileting) grab bar, toilet;raised toilet seat   [place] Level (Toileting) toileting skills;supervision;verbal cues   Clinical Impression   Criteria for Skilled Therapeutic Interventions Met (OT) Yes, treatment indicated   OT Diagnosis decreased indep with ADLs and trsfs due to diverticulitis of colon with perforation.   OT Problem List-Impairments impacting ADL problems related to;activity tolerance impaired;mobility;post-surgical precautions   Assessment of Occupational Performance 3-5 Performance Deficits   Identified Performance Deficits dressing, toileting, bathing and trsfs   Planned Therapy Interventions (OT) ADL retraining;transfer training;progressive activity/exercise   Clinical Decision Making Complexity (OT) detailed assessment/moderate complexity   Risk & Benefits of therapy have been explained evaluation/treatment results reviewed;participants  included;patient;daughter;son   OT Total Evaluation Time   OT Eval, Moderate Complexity Minutes (72342) 15   OT Goals   Therapy Frequency (OT) Daily   OT Predicted Duration/Target Date for Goal Attainment 11/26/24   OT Goals Hygiene/Grooming;Lower Body Dressing;Toilet Transfer/Toileting;Transfers   OT: Hygiene/Grooming modified independent   OT: Lower Body Dressing Modified independent   OT: Transfer Modified independent  (tub/shower)   OT: Toilet Transfer/Toileting Modified independent   Interventions   Interventions Quick Adds Self-Care/Home Management   Self-Care/Home Management   Self-Care/Home Mgmt/ADL, Compensatory, Meal Prep Minutes (94233) 30   Symptoms Noted During/After Treatment (Meal Preparation/Planning Training) fatigue   Treatment Detail/Skilled Intervention Pt sitting EOB when therapist arrived.  Pt's Son and Dtr present and supportive.  Pt on RA with O2 sats 93%.  Educated Pt on her abdom. precautions and use of AE for L/B dressing.  Practiced with the reacher and sock aid for doffing and donning her socks while avoiding forward flex of her trunk.  Pt was SBA with AE.  Pt reports she has a reacher at home but not the sock aid.  Worked on BR trsfs to the toilet.  Encourage use of elevated toilet seat using commode overlay here in the hospital.  Pt believes she has a RTS at home in storage.  Educated Pt on tub/shower trsf using the side step method.  Pt was able to demonstrate her understanding using a WIS (no tub available in room).  Pt is aware that she can also use a shower chair for a sit and pivot trsf into the tub area at home.  At end of session, Pt was sitting EOB with bed alarm on and call light in reach.  RN aware.  Family present.   OT Discharge Planning   OT Plan L/B dressing with reacher and sock aid.  Toileting with commode overlay.  G/H in standing.  Monitor O2 sats - COPD.   OT Discharge Recommendation (DC Rec) (S)  Transitional Care Facility   OT Rationale for DC Rec Pt is not at her  baseline as she is currently needing to use a walker for her room trsfs.  Abdom precautions with ADLs needs further training using AE to avoid forward flex of her trunk.  Will benefit from OT to increases indep with ADLs and trsfs.   OT Brief overview of current status CGA to min A with trsfs and ADLs using FWW and AE   OT Equipment Needed at Discharge raised toilet seat;reacher;shower chair;other (see comments)  (sock aid)   Total Session Time   Timed Code Treatment Minutes 30   Total Session Time (sum of timed and untimed services) 45

## 2024-11-19 NOTE — PLAN OF CARE
"Pt A&Ox4, VSS, 1-2LPM, Afebrile.  Pt denies any pain, SOB or n/v.  Pt SBA with wgb  Tolerating ice chips  PIV patent & infusing NS at 150mL  Pt care ongoing, call light within reach, bed in lowest position, alarms on for safety. Discharge pending.    HLM Admission: 7- Walk 25 feet or more  HLM Daily6- Walk 10 steps or more             Problem: Adult Inpatient Plan of Care  Goal: Patient-Specific Goal (Individualized)  Description: You can add care plan individualizations to a care plan. Examples of Individualization might be:  \"Parent requests to be called daily at 9am for status\", \"I have a hard time hearing out of my right ear\", or \"Do not touch me to wake me up as it startles  me\".  11/19/2024 0600 by Carly Anne, RN  Outcome: Progressing  11/18/2024 2158 by Carly Anne, RN  Outcome: Progressing     Problem: Adult Inpatient Plan of Care  Goal: Absence of Hospital-Acquired Illness or Injury  Intervention: Prevent Infection  Recent Flowsheet Documentation  Taken 11/19/2024 0015 by Carly Anne, RN  Infection Prevention:   single patient room provided   rest/sleep promoted   hand hygiene promoted   equipment surfaces disinfected  Taken 11/18/2024 2000 by Carly Anne, RN  Infection Prevention:   single patient room provided   rest/sleep promoted   hand hygiene promoted   equipment surfaces disinfected     "

## 2024-11-19 NOTE — PROGRESS NOTES
[facility]    Progress Note - Hospitalist Service       Date of Admission:  11/17/2024    Assessment & Plan   Alexandra Mckeon is a 74 year old female admitted on 11/17/2024. She has a history of COPD, HTN, HLD, heartburn and is admitted for acute perforated sigmoid diverticulitis with sepsis.     Acute severe perforated diverticulitis  Sepsis  Leukocytosis  Fever  Sinus tachycardia  Presents with a week of generalized abdominal pain and 1 day of generalized weakness and was found to be febrile with leukocytosis of 19.6, tachycardic to 110-120 and with CT demonstrating acute perforated sigmoid diverticulitis.  Exam without rebound or guarding. Lactic acid within normal limits.  ED provider discussed with on-call general surgery noting localized perforated gas without evidence of abscess at this time and general surgery consulted. S/p~2.25 L bolus and dose of meropenem in ED.    -General Surgery consulted, appreciate recommendations and cares   - No surgical intervention recommended  - PO Ciprofloxacin 500 mg BID  - IV Flagyl 500 mg TID  - Discontinue meropenem  - Discontinue maintenance IV fluids  - Clear liquid diet as tolerated  - Zofran as needed  - Tylenol as needed  - Follow CBC  - Follow-up outpatient for colonoscopy    Heartburn  Persistent heartburn with Tums, was not receiving famotidine.  - PTA famotidine  -Tums as needed  -Maalox as needed    Chronic conditions:  Chronic kidney disease stage III  Creatinine of 1.20 on admission with recent creatinine 1.37 one week ago, creatinine around 1.0 in the last year.  -Follow BMP    COPD  - SpO2 goal of 88%  - PTA Trelegy  - PTA albuterol     Hypertension  -PTA losartan     Hyperlipidemia  -PTA simvastatin        Diet: Clear Liquid Diet    DVT Prophylaxis: Pneumatic Compression Devices  Ugalde Catheter: Not present  Fluids: PO   Lines: None     Cardiac Monitoring: None  Code Status: No CPR- Do NOT Intubate      Clinically Significant  "Risk Factors          # Hyperchloremia: Highest Cl = 111 mmol/L in last 2 days, will monitor as appropriate           # Coagulation Defect: INR = 1.21 (Ref range: 0.85 - 1.15) and/or PTT = N/A, will monitor for bleeding        # Acute Hypoxic Respiratory Failure: Documented O2 saturation < 90%. Continue supplemental oxygen as needed         # Overweight: Estimated body mass index is 29.81 kg/m  as calculated from the following:    Height as of this encounter: 1.575 m (5' 2\").    Weight as of this encounter: 73.9 kg (163 lb)., PRESENT ON ADMISSION            Social Drivers of Health   Tobacco Use: Medium Risk (11/18/2024)    Patient History     Smoking Tobacco Use: Former     Smokeless Tobacco Use: Unknown         Disposition Plan     Medically Ready for Discharge: Anticipated in 2-4 Days         The patient's care was discussed with the Attending Physician, Dr. Tita Raines MD .    Holly Herring MD  Hospitalist Service  [facility]  Securely message with [employer] (more info)  Text page via [employer] Paging/Directory   ______________________________________________________________________    Interval History   Patient states that she is tolerating a liquid diet with some abdominal pain. She says this pain is tolerable. She endorses many loose stools overnight as well as persistent heartburn that has not responded to Tums. She is otherwise comfortable.     Physical Exam   Vital Signs: Temp: 98.6  F (37  C) Temp src: Oral BP: 116/61 Pulse: 89   Resp: 20 SpO2: 90 % O2 Device: None (Room air) Oxygen Delivery: 2.5 LPM  Weight: 163 lbs 0 oz    Physical Exam  Constitutional:       General: She is not in acute distress.     Appearance: Normal appearance. She is not ill-appearing.   Cardiovascular:      Rate and Rhythm: Normal rate and regular rhythm.      Pulses: Normal pulses.      Heart sounds: Normal heart sounds.   Pulmonary:      Breath sounds: Normal breath sounds.      Comments: Increased work of " breathing on room air.  Abdominal:      General: Abdomen is flat. Bowel sounds are normal.      Tenderness: There is abdominal tenderness. There is no guarding or rebound.      Comments: Diffuse abdominal tenderness to palpation.    Skin:     General: Skin is warm and dry.   Neurological:      Mental Status: She is alert.         Data     I have personally reviewed the following data over the past 24 hrs:    13.5 (H)  \   10.9 (L)   / 170     144 111 (H) 16.0 /  100 (H)   3.4 19 (L) 0.93 \       Imaging results reviewed over the past 24 hrs:   No results found for this or any previous visit (from the past 24 hours).

## 2024-11-19 NOTE — PROGRESS NOTES
11/19/24 1130   Appointment Info   Signing Clinician's Name / Credentials (PT) Chen Lino, PT, DPT   Living Environment   People in Home child(hector), adult   Current Living Arrangements house   Home Accessibility stairs to enter home;stairs within home   Number of Stairs, Main Entrance 4   Stair Railings, Main Entrance railings safe and in good condition   Number of Stairs, Within Home, Primary greater than 10 stairs   Stair Railings, Within Home, Primary railings safe and in good condition   Living Environment Comments Pt reports goes downstairs to do laundry. Can stay on main level otherwise.   Self-Care   Equipment Currently Used at Home none   General Information   Onset of Illness/Injury or Date of Surgery 11/17/24   Referring Physician Tita Raines MD   Patient/Family Therapy Goals Statement (PT) to get better   Pertinent History of Current Problem (include personal factors and/or comorbidities that impact the POC) 74 year old female admitted on 11/17/2024. She has a history of COPD, hypertension, hyperlipidemia, and heartburn and is admitted for acute perforated sigmoid diverticulitis with sepsis.   Existing Precautions/Restrictions no known precautions/restrictions   Weight-Bearing Status - LLE full weight-bearing   Weight-Bearing Status - RLE full weight-bearing   Bed Mobility   Bed Mobility supine-sit   Supine-Sit [place] (Bed Mobility) verbal cues;contact guard   Comment, (Bed Mobility) CGA with supine to sit transfers. Verbal cues for safety and safe hand placement.   Transfers   Transfers sit-stand transfer   Comment, (Transfers) Min assist x 1 with FWW for sit<>stand transfer. Verbal cues for safety and safe hand placement.   Sit-Stand Transfer   Sit-Stand [place] (Transfers) verbal cues;minimum assist (75% patient effort);1 person assist   Assistive Device (Sit-Stand Transfers) walker, front-wheeled   Comment, (Sit-Stand Transfer) Min assist x 1 with FWW for sit<>stand transfers. Verbal  cues for safety and safe hand placement.   Gait/Stairs (Locomotion)   [place] Level (Gait) verbal cues;minimum assist (75% patient effort);1 person assist   Assistive Device (Gait) walker, front-wheeled   Distance in Feet (Gait) 10'   Pattern (Gait) step-to   Deviations/Abnormal Patterns (Gait) base of support, wide;jovani decreased;gait speed decreased   Comment, (Gait/Stairs) Pt ambulates with min assist x 1 and FWW. Verbal cues for safety and posture. Cues to keep close to walker.   Clinical Impression   Criteria for Skilled Therapeutic Intervention Yes, treatment indicated   PT Diagnosis (PT) impaired functional mobility   Influenced by the following impairments weakness, pain   Functional limitations due to impairments transfers, ambulation, stairs   Clinical Presentation (PT Evaluation Complexity) stable   Clinical Presentation Rationale Pt presents as medically diagnosed   Clinical Decision Making (Complexity) moderate complexity   Planned Therapy Interventions (PT) balance training;bed mobility training;gait training;home exercise program;patient/family education;ROM (range of motion);stair training;strengthening;stretching;transfer training   Risk & Benefits of therapy have been explained evaluation/treatment results reviewed;care plan/treatment goals reviewed;patient   PT Total Evaluation Time   PT Eval, Moderate Complexity Minutes (18569) 10   Physical Therapy Goals   PT Frequency 5x/week   PT Predicted Duration/Target Date for Goal Attainment 11/29/24   PT Goals Transfers;Gait;Stairs   PT: Transfers Modified independent;Sit to/from stand;Assistive device  (FWW)   PT: Gait Modified independent;Assistive device;Rolling walker;100 feet  (FWW)   PT: Stairs Supervision/stand-by assist;4 stairs;Rail on both sides   Interventions   Interventions Quick Adds Gait Training;Therapeutic Activity   Therapeutic Activity   Therapeutic Activities: dynamic activities to improve functional performance Minutes  (73638) 10   Treatment Detail/Skilled Intervention Pt supine in bed. Pt on room air throughout session. CGA with supine to sit transfer. Verbal cues for safety. Min assist x 1 with FWW for sit<>stand transfer. Verbal cues for safety and safe hand placement. Assist with donning/doffing brief. Pt sitting up in chair at end of session with chair alarm on and call light within reach.   Gait Training   Gait Training Minutes (03311) 10   Treatment Detail/Skilled Intervention Pt ambulates with min assist x 1 and FWW. Verbal cues for safety and posture. Cues to keep close to walker.   Distance in Feet 15', 30'   [place] Level (Gait Training) minimum assist (75% patient effort)   Physical Assistance Level (Gait Training) verbal cues;1 person assist   Weight Bearing (Gait Training) full weight-bearing   Assistive Device (Gait Training) rolling walker   Pattern Analysis (Gait Training) swing-to gait   Gait Analysis Deviations decreased jovani;decreased step length;increased stride width   Impairments (Gait Analysis/Training) pain;ROM decreased;strength decreased;balance impaired   PT Discharge Planning   PT Plan progress transfers and ambulation, trial stairs, therex   PT Discharge Recommendation (DC Rec) (S)  Transitional Care Facility   PT Rationale for DC Rec Pt not at baseline. Pt reports lives with son, but has yet to complete stairs with PT. Pt would benefit from 24 hour assist and continued PT to improve functional mobility.   PT Brief overview of current status Min assist x 1 with FWW for sit<>stand transfers and 30 feet   Physical Therapy Time and Intention   Timed Code Treatment Minutes 20   Total Session Time (sum of timed and untimed services) 30     Chen Lino, PT, DPT

## 2024-11-20 VITALS
DIASTOLIC BLOOD PRESSURE: 64 MMHG | HEART RATE: 74 BPM | TEMPERATURE: 97.8 F | SYSTOLIC BLOOD PRESSURE: 137 MMHG | WEIGHT: 165.2 LBS | RESPIRATION RATE: 18 BRPM | HEIGHT: 62 IN | OXYGEN SATURATION: 90 % | BODY MASS INDEX: 30.4 KG/M2

## 2024-11-20 LAB
ANION GAP SERPL CALCULATED.3IONS-SCNC: 12 MMOL/L (ref 7–15)
BUN SERPL-MCNC: 15.6 MG/DL (ref 8–23)
CALCIUM SERPL-MCNC: 8.8 MG/DL (ref 8.8–10.4)
CHLORIDE SERPL-SCNC: 109 MMOL/L (ref 98–107)
CREAT SERPL-MCNC: 0.79 MG/DL (ref 0.51–0.95)
EGFRCR SERPLBLD CKD-EPI 2021: 78 ML/MIN/1.73M2
ERYTHROCYTE [DISTWIDTH] IN BLOOD BY AUTOMATED COUNT: 12.7 % (ref 10–15)
GLUCOSE SERPL-MCNC: 91 MG/DL (ref 70–99)
HCO3 SERPL-SCNC: 20 MMOL/L (ref 22–29)
HCT VFR BLD AUTO: 32.7 % (ref 35–47)
HGB BLD-MCNC: 10.8 G/DL (ref 11.7–15.7)
MCH RBC QN AUTO: 29.4 PG (ref 26.5–33)
MCHC RBC AUTO-ENTMCNC: 33 G/DL (ref 31.5–36.5)
MCV RBC AUTO: 89 FL (ref 78–100)
PLATELET # BLD AUTO: 194 10E3/UL (ref 150–450)
POTASSIUM SERPL-SCNC: 3.4 MMOL/L (ref 3.4–5.3)
RBC # BLD AUTO: 3.67 10E6/UL (ref 3.8–5.2)
SODIUM SERPL-SCNC: 141 MMOL/L (ref 135–145)
WBC # BLD AUTO: 12.3 10E3/UL (ref 4–11)

## 2024-11-20 PROCEDURE — 99231 SBSQ HOSP IP/OBS SF/LOW 25: CPT | Performed by: PHYSICIAN ASSISTANT

## 2024-11-20 PROCEDURE — 250N000013 HC RX MED GY IP 250 OP 250 PS 637

## 2024-11-20 PROCEDURE — 99239 HOSP IP/OBS DSCHRG MGMT >30: CPT | Mod: GC | Performed by: STUDENT IN AN ORGANIZED HEALTH CARE EDUCATION/TRAINING PROGRAM

## 2024-11-20 PROCEDURE — 36415 COLL VENOUS BLD VENIPUNCTURE: CPT

## 2024-11-20 PROCEDURE — 85048 AUTOMATED LEUKOCYTE COUNT: CPT

## 2024-11-20 PROCEDURE — 80048 BASIC METABOLIC PNL TOTAL CA: CPT

## 2024-11-20 PROCEDURE — 250N000011 HC RX IP 250 OP 636: Performed by: PHYSICIAN ASSISTANT

## 2024-11-20 PROCEDURE — 85014 HEMATOCRIT: CPT

## 2024-11-20 RX ORDER — METRONIDAZOLE 500 MG/1
500 TABLET ORAL 3 TIMES DAILY
Status: DISCONTINUED | OUTPATIENT
Start: 2024-11-20 | End: 2024-11-20 | Stop reason: HOSPADM

## 2024-11-20 RX ORDER — AMOXICILLIN 250 MG
1 CAPSULE ORAL 2 TIMES DAILY PRN
Qty: 60 TABLET | Refills: 0 | Status: SHIPPED | OUTPATIENT
Start: 2024-11-20

## 2024-11-20 RX ORDER — CIPROFLOXACIN 500 MG/1
500 TABLET, FILM COATED ORAL EVERY 12 HOURS
Qty: 22 TABLET | Refills: 0 | Status: SHIPPED | OUTPATIENT
Start: 2024-11-20 | End: 2024-11-21

## 2024-11-20 RX ORDER — METRONIDAZOLE 500 MG/100ML
500 INJECTION, SOLUTION INTRAVENOUS EVERY 8 HOURS
Qty: 3300 ML | Refills: 0 | Status: SHIPPED | OUTPATIENT
Start: 2024-11-20 | End: 2024-11-20

## 2024-11-20 RX ORDER — METRONIDAZOLE 500 MG/1
500 TABLET ORAL 3 TIMES DAILY
Qty: 33 TABLET | Refills: 0 | Status: SHIPPED | OUTPATIENT
Start: 2024-11-20 | End: 2024-11-21

## 2024-11-20 RX ORDER — POLYETHYLENE GLYCOL 3350 17 G/17G
17 POWDER, FOR SOLUTION ORAL DAILY
Qty: 510 G | Refills: 0 | Status: SHIPPED | OUTPATIENT
Start: 2024-11-20

## 2024-11-20 RX ORDER — ONDANSETRON 4 MG/1
4 TABLET, ORALLY DISINTEGRATING ORAL EVERY 6 HOURS PRN
Qty: 30 TABLET | Refills: 0 | Status: SHIPPED | OUTPATIENT
Start: 2024-11-20

## 2024-11-20 RX ORDER — ACETAMINOPHEN 325 MG/1
325 TABLET ORAL EVERY 4 HOURS PRN
Qty: 30 TABLET | Refills: 0 | Status: SHIPPED | OUTPATIENT
Start: 2024-11-20

## 2024-11-20 RX ADMIN — ACETAMINOPHEN 650 MG: 325 TABLET ORAL at 05:18

## 2024-11-20 RX ADMIN — METRONIDAZOLE 500 MG: 500 INJECTION, SOLUTION INTRAVENOUS at 00:31

## 2024-11-20 RX ADMIN — CIPROFLOXACIN HYDROCHLORIDE 500 MG: 500 TABLET, FILM COATED ORAL at 07:53

## 2024-11-20 RX ADMIN — LOSARTAN POTASSIUM 25 MG: 25 TABLET, FILM COATED ORAL at 08:02

## 2024-11-20 RX ADMIN — CYANOCOBALAMIN TAB 1000 MCG 1000 MCG: 1000 TAB at 08:02

## 2024-11-20 RX ADMIN — FAMOTIDINE 20 MG: 20 TABLET, FILM COATED ORAL at 08:02

## 2024-11-20 RX ADMIN — METRONIDAZOLE 500 MG: 500 INJECTION, SOLUTION INTRAVENOUS at 07:47

## 2024-11-20 ASSESSMENT — ACTIVITIES OF DAILY LIVING (ADL)
ADLS_ACUITY_SCORE: 0
DEPENDENT_IADLS:: INDEPENDENT

## 2024-11-20 NOTE — PROGRESS NOTES
General Surgery Progress Note:    Hospital Day # 2    ASSESSMENT:   1. Diverticulitis of colon with perforation    2. Acute sepsis (H)        Alexandra Mckeon is a 74 year old female who was admitted with cute diverticulitis with perforation and without evidence of abscess. Remains afebrile and hemodynamically stable. Labs with continued improvement in leukocytosis (WBC 12.3 < 13.5). Tolerating a clear liquid diet and having antegrade bowel function.    PLAN:   - Low fiber diet  - Continue antibiotics, recommend total of 14-day course  - Increase activity as tolerated  - Medical management per primary team  - Okay for discharge from a surgical standpoint when deemed medically appropriate. Will arrange for outpatient follow-up in 4-5 weeks. TCU placement pending.    SUBJECTIVE:   Alexandra Mckeon is feeling okay today. Continues to endorse mild abdominal pain, better than yesterday. Denies fever, chills, nausea, vomiting. Reports passing liquid stools. Ambulating with assistance and with walker.     Patient Vitals for the past 24 hrs:   BP Temp Temp src Pulse Resp SpO2 Weight   11/20/24 0745 -- 98.2  F (36.8  C) Oral -- -- -- --   11/20/24 0728 139/66 -- -- 79 -- 92 % --   11/20/24 0540 -- -- -- -- -- -- 74.9 kg (165 lb 3.2 oz)   11/20/24 0037 (!) 144/76 98.8  F (37.1  C) Oral 86 18 93 % --   11/19/24 1619 121/58 98.9  F (37.2  C) Oral 85 16 92 % --   11/19/24 1406 -- 97.6  F (36.4  C) Oral 60 16 100 % --       Physical Exam:  General: patient seen resting in bed, no acute distress  Resp: no respiratory distress, breathing comfortably on room air  Abdomen: Softly distended, mildly tender to deep palpation over LLQ. No rebound or guarding.  Extremities: warm and well perfused    Admission on 11/17/2024   Component Date Value    Ventricular Rate 11/17/2024 121     Atrial Rate 11/17/2024 121     HI Interval 11/17/2024 152     QRS Duration 11/17/2024 82     QT 11/17/2024 320     QTc 11/17/2024 454     P Axis 11/17/2024 67      R AXIS 11/17/2024 58     T [place] 11/17/2024 58     Interpretation ECG 11/17/2024                      Value:Sinus tachycardia  Otherwise normal ECG  No previous ECGs available  Confirmed by SEE ED PROVIDER NOTE FOR, ECG INTERPRETATION (4000),  Belén Calvillo ([id number]) on 11/18/2024 2:41:43 AM      INR 11/17/2024 1.21 (H)     Sodium 11/17/2024 138     Potassium 11/17/2024 4.0     Carbon Dioxide (CO2) 11/17/2024 24     Anion Gap 11/17/2024 15     Urea Nitrogen 11/17/2024 17.0     Creatinine 11/17/2024 1.20 (H)     GFR Estimate 11/17/2024 47 (L)     Calcium 11/17/2024 10.2     Chloride 11/17/2024 99     Glucose 11/17/2024 162 (H)     Alkaline Phosphatase 11/17/2024 86     AST 11/17/2024 24     ALT 11/17/2024 16     Protein Total 11/17/2024 7.7     Albumin 11/17/2024 4.2     Bilirubin Total 11/17/2024 1.0     Lactic Acid, Initial 11/17/2024 1.5     Troponin T, High Sens[place]* 11/17/2024 14     Influenza A PCR 11/17/2024 Negative     Influenza B PCR 11/17/2024 Negative     RSV PCR 11/17/2024 Negative     SARS CoV2 PCR 11/17/2024 Negative     Color Urine 11/17/2024 Yellow     Appearance Urine 11/17/2024 Clear     Glucose Urine 11/17/2024 Negative     Bilirubin Urine 11/17/2024 Negative     Ketones Urine 11/17/2024 10 (A)     Specific Gravity Urine 11/17/2024 1.022     Blood Urine 11/17/2024 0.1 mg/dL (A)     pH Urine 11/17/2024 6.5     Protein Albumin Urine 11/17/2024 30 (A)     Urobilinogen Urine 11/17/2024 <2.0     Nitrite Urine 11/17/2024 Negative     Leukocyte Esterase Urine 11/17/2024 25 Aneesh/uL (A)     RBC Urine 11/17/2024 3 (H)     WBC Urine 11/17/2024 3     Squamous Epithelials Uri* 11/17/2024 1     Procalcitonin 11/17/2024 0.41     Culture 11/17/2024 No growth after 2 days     WBC Count 11/17/2024 19.6 (H)     RBC Count 11/17/2024 4.48     Hemoglobin 11/17/2024 13.3     Hematocrit 11/17/2024 39.3     MCV 11/17/2024 88     MCH 11/17/2024 29.7     MCHC 11/17/2024 33.8     RDW 11/17/2024 12.5     Platelet Count  11/17/2024 207     % Neutrophils 11/17/2024 86     % Lymphocytes 11/17/2024 5     % Monocytes 11/17/2024 8     % Eosinophils 11/17/2024 0     % Basophils 11/17/2024 0     % Immature Granulocytes 11/17/2024 1     NRBCs per 100 WBC 11/17/2024 0     Absolute Neutrophils 11/17/2024 16.8 (H)     Absolute Lymphocytes 11/17/2024 1.0     Absolute Monocytes 11/17/2024 1.5 (H)     Absolute Eosinophils 11/17/2024 0.0     Absolute Basophils 11/17/2024 0.0     Absolute Immature Granul* 11/17/2024 0.2     Absolute NRBCs 11/17/2024 0.0     Troponin T, High [place]ens[place]* 11/18/2024 15 (H)     Systolic Blood Pressure 11/18/2024 112     Diastolic Blood Pressure 11/18/2024 75     Ventricular Rate 11/18/2024 106     Atrial Rate 11/18/2024 106     VT Interval 11/18/2024 158     QRS Duration 11/18/2024 86     QT 11/18/2024 336     QTc 11/18/2024 446     P Axis 11/18/2024 60     R AXIS 11/18/2024 52     T Axis 11/18/2024 61     Interpretation ECG 11/18/2024                      Value:Sinus tachycardia with occasional Premature ventricular complexes  Otherwise normal ECG  When compared with ECG of 17-Nov-2024 23:25,  Premature ventricular complexes are now Present  Confirmed by SEE ED PROVIDER NOTE FOR, ECG INTERPRETATION (4000),  Belén Calvillo ([id number]) on 11/18/2024 2:40:41 AM      WBC Count 11/19/2024 13.5 (H)     RBC Count 11/19/2024 3.66 (L)     Hemoglobin 11/19/2024 10.9 (L)     Hematocrit 11/19/2024 33.4 (L)     MCV 11/19/2024 91     MCH 11/19/2024 29.8     MCHC 11/19/2024 32.6     RDW 11/19/2024 13.0     Platelet Count 11/19/2024 170     Sodium 11/19/2024 144     Potassium 11/19/2024 3.4     Chloride 11/19/2024 111 (H)     Carbon Dioxide (CO2) 11/19/2024 19 (L)     Anion Gap 11/19/2024 14     Urea Nitrogen 11/19/2024 16.0     Creatinine 11/19/2024 0.93     GFR Estimate 11/19/2024 64     Calcium 11/19/2024 8.9     Glucose 11/19/2024 100 (H)     WBC Count 11/20/2024 12.3 (H)     RBC Count 11/20/2024 3.67 (L)     Hemoglobin 11/20/2024  10.8 (L)     Hematocrit 11/20/2024 32.7 (L)     MCV 11/20/2024 89     MCH 11/20/2024 29.4     MCHC 11/20/2024 33.0     RDW 11/20/2024 12.7     Platelet Count 11/20/2024 194     Sodium 11/20/2024 141     Potassium 11/20/2024 3.4     Chloride 11/20/2024 109 (H)     Carbon Dioxide (CO2) 11/20/2024 20 (L)     Anion Gap 11/20/2024 12     Urea Nitrogen 11/20/2024 15.6     Creatinine 11/20/2024 0.79     GFR Estimate 11/20/2024 78     Calcium 11/20/2024 8.8     Glucose 11/20/2024 91         Denia Bonner PA-C  [facility]  Surgery [address]  [address]  [place] [address]?  Office: [phone]

## 2024-11-20 NOTE — PLAN OF CARE
"  Problem: Adult Inpatient Plan of Care  Goal: Plan of Care Review  Description: The Plan of Care Review/Shift note should be completed every shift.  The Outcome Evaluation is a brief statement about your assessment that the patient is improving, declining, or no change.  This information will be displayed automatically on your shift  note.  Outcome: Adequate for Care Transition  Goal: Patient-Specific Goal (Individualized)  Description: You can add care plan individualizations to a care plan. Examples of Individualization might be:  \"Parent requests to be called daily at 9am for status\", \"I have a hard time hearing out of my right ear\", or \"Do not touch me to wake me up as it startles  me\".  Outcome: Adequate for Care Transition  Goal: Absence of Hospital-Acquired Illness or Injury  Outcome: Adequate for Care Transition  Goal: Optimal Comfort and Wellbeing  Outcome: Adequate for Care Transition     Problem: Fall Injury Risk  Goal: Absence of Fall and Fall-Related Injury  Outcome: Adequate for Care Transition     Problem: Comorbidity Management  Goal: Blood Pressure in Desired Range  Outcome: Adequate for Care Transition     Problem: Skin Injury Risk Increased  Goal: Skin Health and Integrity  Outcome: Adequate for Care Transition   Goal Outcome Evaluation: patient met goals for discharge. Will move to discharge lounge for discharge instructions                          "

## 2024-11-20 NOTE — PROGRESS NOTES
Care Management Discharge Note    Discharge Date: 11/20/2024       Discharge Disposition: Transitional Care    Discharge Services: None    Discharge DME: None    Discharge Transportation: family or friend will provide    Private pay costs discussed: Not applicable    Does the patient's insurance plan have a 3 day qualifying hospital stay waiver?  No    PAS Confirmation Code: [id number]  Patient/family educated on Medicare website which has current facility and service quality ratings: yes    Education Provided on the Discharge Plan: Yes (bedside RN)  Persons Notified of Discharge Plans: yes  Patient/Family in Agreement with the Plan: yes    Handoff Referral Completed: yes    Additional Information:  1:01 PM  Pt discharging to [facility] today at 2:30. Daughter Hailey to discharge. Cm to send orders.     SUSAN Infante

## 2024-11-20 NOTE — PLAN OF CARE
Goal Outcome Evaluation:      Plan of Care Reviewed With: patient, child          Outcome Evaluation: Pt to d/c to TCU once medically ready and appropriate facility located.

## 2024-11-20 NOTE — PLAN OF CARE
Pt A&Ox4, VSS, RA, Afebrile.  Pt denies any pain, SOB or n/v.  Pt SBA with wgb  Tolerating clear diet  PIV patent & SL  Pt care ongoing, call light within reach, bed in lowest position, alarms on for safety. Discharge pending.     HLM Admission: 7- Walk 25 feet or more  HLM Daily6- Walk 10 steps or more             Problem: Fall Injury Risk  Goal: Absence of Fall and Fall-Related Injury  11/20/2024 0424 by Carly Anne RN  Outcome: Progressing  11/19/2024 2211 by Carly Anne RN  Outcome: Progressing  Intervention: Identify and Manage Contributors  Recent Flowsheet Documentation  Taken 11/19/2024 2052 by Carly Anne RN  Medication Review/Management: medications reviewed  Intervention: Promote Injury-Free Environment  Recent Flowsheet Documentation  Taken 11/20/2024 0400 by Carly Anne, RN  Safety Promotion/Fall Prevention: safety round/check completed  Taken 11/20/2024 0300 by Carly Anne RN  Safety Promotion/Fall Prevention: safety round/check completed  Taken 11/20/2024 0200 by Carly Anne, RN  Safety Promotion/Fall Prevention: safety round/check completed  Taken 11/20/2024 0100 by Carly Anne, RN  Safety Promotion/Fall Prevention: safety round/check completed  Taken 11/19/2024 2052 by Carly Anne, RN  Safety Promotion/Fall Prevention: safety round/check completed     Problem: Adult Inpatient Plan of Care  Goal: Optimal Comfort and Wellbeing  11/20/2024 0424 by Carly Anne, RN  Outcome: Progressing  11/19/2024 2211 by Carly Anne, RN  Outcome: Progressing

## 2024-11-20 NOTE — PLAN OF CARE
Problem: Adult Inpatient Plan of Care  Goal: Readiness for Transition of Care  Outcome: Adequate for Care Transition    Goal Outcome Evaluation:    Patient is awake and alert and oriented to time, place and person.    B/P: 139/66, T: 98.2, P: 79, R: 18.    RA none to maintain saturations > 90 %.    C/O 0/10 pain. PRN's administered none.    Assist of 1, Gait belt, and Walker.    Orders Placed This Encounter      Low Fiber Diet    Saline Lock.    Alarms on. Call light within reach.    Discharge plan TCU placement .    Patient transferred to . Report provided to SARAH Jonas.    HLM Admission: 6- Walk 10 steps or more  HLM Daily7-Walk 25 feet or more            Problem: Infection  Goal: Absence of Infection Signs and Symptoms  Outcome: Adequate for Care Transition     Problem: Gas Exchange Impaired  Goal: Optimal Gas Exchange  Outcome: Adequate for Care Transition     Problem: Comorbidity Management  Goal: Maintenance of COPD Symptom Control  Intervention: Maintain COPD Symptom Control  Recent Flowsheet Documentation  Taken 11/20/2024 1022 by Nati Phan, RN  Medication Review/Management: medications reviewed

## 2024-11-20 NOTE — PROGRESS NOTES
Care Management Discharge Note    Discharge Date: 11/21/2024       Discharge Disposition: Transitional Care    Discharge Services: None    Discharge DME: None    Discharge Transportation: family or friend will provide    Private pay costs discussed: Not applicable    Does the patient's insurance plan have a 3 day qualifying hospital stay waiver?  No    PAS Confirmation Code: [id number]  Patient/family educated on Medicare website which has current facility and service quality ratings: yes    Education Provided on the Discharge Plan: Yes (bedside RN)  Persons Notified of Discharge Plans: pt, family, TCU  Patient/Family in Agreement with the Plan: yes    Handoff Referral Completed: No, handoff not indicated or clinically appropriate    Additional Information:  Pt to discharge to [facility] TCU, family to transport.     Henry Espinal RN

## 2024-11-20 NOTE — PROGRESS NOTES
Occupational Therapy Discharge Summary    Reason for therapy discharge:    Discharged to transitional care facility.    Progress towards therapy goal(s). See goals on Care Plan in [facility] electronic health record for goal details.  Goals partially met.  Barriers to achieving goals:   discharge from facility.    Therapy recommendation(s):    Continued therapy is recommended.  Rationale/Recommendations:  To increase indep with ADLs and trsfs.

## 2024-11-20 NOTE — DISCHARGE SUMMARY
"[facility]  Discharge Summary - Medicine & Pediatrics       Date of Admission:  11/17/2024  Date of Discharge:  11/20/2024  3:00 PM  Discharging Provider: Tita Raines MD  Discharge Service: Hospitalist Service    Discharge Diagnoses   Acute severe perforated diverticulitis  Sepsis    Clinically Significant Risk Factors     # Obesity: Estimated body mass index is 30.22 kg/m  as calculated from the following:    Height as of this encounter: 1.575 m (5' 2\").    Weight as of this encounter: 74.9 kg (165 lb 3.2 oz).       Follow-ups Needed After Discharge   Follow-up Appointments       Follow Up and recommended labs and tests      Follow up with Nursing home physician.  No follow up labs or test are needed.            Follow-up with surgery in 4 to 5 weeks from discharge.  Outpatient colonoscopy for further evaluation of diverticulosis.    Unresulted Labs Ordered in the Past 30 Days of this Admission       Date and Time Order Name Status Description    11/17/2024 11:36 PM Blood Culture Peripheral Blood Preliminary         These results will be followed up by TCU medical staff if necessary, blood cultures have been no growth to date.    Discharge Disposition   Discharged to long-term care facility  Condition at discharge: Stable    Hospital Course   Alexandra Mckeon was admitted on 11/17/2024 for sepsis secondary to acute perforated sigmoid diverticulitis.  The following problems were addressed during her hospitalization:    Acute severe perforated diverticulitis  Sepsis  Patient presented with generalized abdominal pain, fever and weakness found to have acute perforated sigmoid diverticulitis on CT abdomen, no abscess noted.  She was started on meropenem in the ED which was then transitioned to IV metronidazole and p.o. ciprofloxacin.  Surgery was consulted, no surgical intervention was required as there was no abscess present.  Her diet was advanced slowly first to clears and then full diet as " tolerated.  She stated that she was not experiencing any increased abdominal pain with progression of her diet, no nausea.  She did have multiple loose stools which were improving upon discharge.  Her fever resolved with antibiotic treatment and she has been vitally stable.  PT and OT were consulted in the setting of increased weakness and recommended TCU for further rehabilitation.    Consultations This Hospital Stay   SURGERY GENERAL IP CONSULT  PHYSICAL THERAPY ADULT IP CONSULT  OCCUPATIONAL THERAPY ADULT IP CONSULT  CARE MANAGEMENT / SOCIAL WORK IP CONSULT  PHYSICAL THERAPY ADULT IP CONSULT  OCCUPATIONAL THERAPY ADULT IP CONSULT    Code Status   No CPR- Do NOT Intubate       The patient was discussed with Dr. Tita Herring MD  Hospitalist Service  [address] [address]  Phone: [phone]  Fax: [phone]  ______________________________________________________________________    Physical Exam   Vital Signs: Temp: 97.8  F (36.6  C) Temp src: Oral BP: 137/64 Pulse: 74   Resp: 18 SpO2: 90 % O2 Device: None (Room air)    Weight: 165 lbs 3.2 oz     Physical Exam  Constitutional:       General: She is not in acute distress.     Appearance: Normal appearance.   Cardiovascular:      Rate and Rhythm: Normal rate and regular rhythm.      Heart sounds: No murmur heard.  Pulmonary:      Effort: Pulmonary effort is normal. No respiratory distress.      Breath sounds: No wheezing.      Comments: Decreased breath sounds throughout lung fields.  Abdominal:      General: Abdomen is flat.      Palpations: Abdomen is soft.      Tenderness: There is abdominal tenderness. There is no guarding or rebound.      Comments: Mild tenderness to palpation in epigastric region and lower abdomen bilaterally.   Musculoskeletal:      Right lower leg: No edema.      Left lower leg: No edema.   Skin:     General: Skin is warm and dry.   Neurological:       Mental Status: She is alert.   Psychiatric:         Mood and Affect: Mood normal.         Primary Care Physician   Provider Not In System    Discharge Orders      General info for SNF    Length of Stay Estimate: Short Term Care: Estimated # of Days <30  Condition at Discharge: Improving  Level of care:skilled   Rehabilitation Potential: Good  Admission H&P remains valid and up-to-date: Yes  Recent Chemotherapy: N/A  Use Nursing Home Standing Orders: Yes     Mantoux instructions    Give two-step Mantoux (PPD) Per Facility Policy Yes     Follow Up and recommended labs and tests    Follow up with Nursing home physician.  No follow up labs or test are needed.     Reason for your hospital stay    Acute diverticulitis with perforation, no abscess     Activity - Up with nursing assistance     Physical Therapy Adult Consult    Evaluate and treat as clinically indicated.    Reason:  deconditioning in the setting of acute illness     Occupational Therapy Adult Consult    Evaluate and treat as clinically indicated.    Reason:  deconditioning in setting of acute illness     Fall precautions     Diet    Follow this diet upon discharge: Current Diet:Orders Placed This Encounter      Low Fiber Diet       Significant Results and Procedures   Results for orders placed or performed during the hospital encounter of 11/17/24   CT Abdomen Pelvis w Contrast    Narrative    EXAM: CT ABDOMEN PELVIS W CONTRAST  LOCATION: [facility]  DATE: 11/18/2024    INDICATION: Abdominal pain.  COMPARISON: None available.  TECHNIQUE: CT scan of the abdomen and pelvis was performed following injection of IV contrast. Multiplanar reformats were obtained. Dose reduction techniques were used.  CONTRAST: 75 mL Isovue-370.    FINDINGS:    LOWER CHEST: Large hiatal hernia. Atherosclerosis of coronary arteries and visualized thoracic aorta.    HEPATOBILIARY: Hepatomegaly and diffuse hepatic steatosis.    Gallbladder is normal.    No  intrahepatic or intrahepatic biliary ductal dilatation.    PANCREAS: Enhances normally. No peripancreatic inflammatory fat stranding.    SPLEEN: Enhances normally. Normal size.    ADRENAL GLANDS: Normal.    KIDNEYS: Both kidneys enhance symmetrically, without hydronephrosis. Mild cortical scarring at the anterior mid zone right kidney.    Tiny nonobstructing left renal stone. Low-attenuation subcentimeter renal lesion(s). These are compatible with small benign cysts and no specific imaging evaluation or follow-up is recommended.    Urinary bladder is unremarkable.    PELVIC ORGANS: Hysterectomy.    BOWEL: Acute diverticulitis of the mid sigmoid colon, with associated perforation, moderate to severe sigmoid wall thickening and moderate surrounding inflammatory fat stranding. Perforated gas maintenance relatively localized to the site of   diverticulitis, though does not appear distinctly contained. No evidence of abscess. Tiny intramural lipoma of the duodenal sweep. Normal appendix.    LYMPH NODES: No suspicious abdominal or pelvic lymphadenopathy.    VASCULATURE: No abdominal aortic aneurysm. Focal ectasia of the infrarenal abdominal aorta, measuring up to 2.8 cm. Moderate to severe atheromatous disease of the abdominal aorta and branch vessels.    MUSCULOSKELETAL: No suspicious abnormality.    OTHER: No additionally significant abnormalities.      Impression    IMPRESSION:   1.  Acute, perforated sigmoid diverticulitis. The perforated gas is relatively localized to the site of acute diverticulitis, though does not appear particularly contained; surgical consultation recommended. No evidence of abscess. Colonoscopy follow-up   is additionally recommended, given the associated wall thickening.  2.  Hepatomegaly and diffuse hepatic steatosis.  3.  Large hiatal hernia.  4.  Tiny nonobstructing left renal stone.      Critical Result: Pneumoperitoneum/bowel perforation    Finding was identified on 11/18/2024 1:28 AM  CST.    Dr. Baird was contacted by me on 11/18/2024 1:30 AM CST and verbalized understanding of the critical result.        XR Chest Port 1 View    Narrative    EXAM: XR CHEST PORT 1 VIEW  LOCATION: [facility]  DATE: 11/18/2024    INDICATION: Fever and cough.  COMPARISON: 3/11/2019.      Impression    IMPRESSION: No acute airspace consolidation. No pleural effusion or pneumothorax. Chin soft tissues obscure both lung apices.    Upper limits of normal heart size. Atherosclerotic calcifications of the thoracic aorta.       Discharge Medications   Discharge Medication List as of 11/20/2024  2:51 PM        START taking these medications    Details   acetaminophen (TYLENOL) 325 MG tablet Take 1 tablet (325 mg) by mouth every 4 hours as needed for mild pain or fever., Disp-30 tablet, R-0, E-Prescribe      ciprofloxacin (CIPRO) 500 MG tablet Take 1 tablet (500 mg) by mouth every 12 hours for 11 days., Disp-22 tablet, R-0, E-Prescribe      metroNIDAZOLE (FLAGYL) 500 MG tablet Take 1 tablet (500 mg) by mouth 3 times daily for 11 days., Disp-33 tablet, R-0, E-Prescribe      ondansetron (ZOFRAN ODT) 4 MG ODT tab Take 1 tablet (4 mg) by mouth every 6 hours as needed for nausea., Disp-30 tablet, R-0, E-Prescribe      polyethylene glycol (MIRALAX) 17 GM/Dose powder Take 17 g by mouth daily., Disp-510 g, R-0, E-Prescribe      senna-docusate (SENOKOT-S/PERICOLACE) 8.6-50 MG tablet Take 1 tablet by mouth 2 times daily as needed for constipation., Disp-60 tablet, R-0, E-Prescribe           CONTINUE these medications which have NOT CHANGED    Details   albuterol (PROAIR HFA/PROVENTIL HFA/VENTOLIN HFA) 108 (90 Base) MCG/ACT inhaler Inhale 1 puff into the lungs every 4 hours as needed., Historical      calcium carbonate (TUMS) 500 MG chewable tablet Take 1 chew tab by mouth 4 times daily as needed for heartburn., Historical      Calcium Magnesium Zinc 333-133-5 MG TABS Take 3 tablets by mouth daily.,  Historical      cyanocobalamin (VITAMIN B-12) 1000 MCG tablet Take 1,000 mcg by mouth daily., Historical      famotidine (PEPCID) 20 MG tablet Take 20 mg by mouth daily., Historical      losartan (COZAAR) 50 MG tablet Take 25 mg by mouth daily., Historical      potassium gluconate 2.5 MEQ tablet Take 1 tablet by mouth daily., Historical      simvastatin (ZOCOR) 20 MG tablet Take 20 mg by mouth at bedtime., Historical      TRELEGY ELLIPTA 100-62.5-25 MCG/ACT oral inhaler Inhale 1 puff into the lungs daily., [place]UID[place], Historical           STOP taking these medications       metroNIDAZOLE (FLAGYL) 5 mg/mL infusion Comments:   Reason for Stopping:             Allergies   Allergies   Allergen Reactions    Aspirin Hives    Cephalexin Hives    Ibuprofen Hives    Penicillin V Hives    Penicillins Hives    Potassium Hives

## 2024-11-20 NOTE — CONSULTS
Care Management Initial Consult    General Information  Assessment completed with: Patient, Children, pt and Hailey  Type of CM/SW Visit: Initial Assessment    Primary Care Provider verified and updated as needed: No (pt's clinic is closing,needs a new PCP)   Readmission within the last 30 days: no previous admission in last 30 days      Reason for Consult: discharge planning  Advance Care Planning: Advance Care Planning Reviewed: present on chart, verified with patient          Communication Assessment  Patient's communication style: spoken language (English or Bilingual)    Hearing Difficulty or Deaf: no   Wear Glasses or Blind: no    Cognitive  Cognitive/Neuro/Behavioral: WDL  Level of Consciousness: alert  Arousal Level: opens eyes spontaneously  Orientation: oriented x 4  Mood/Behavior: calm, cooperative  Best Language: 0 - No aphasia  Speech: clear, spontaneous, logical    Living Environment:   People in home: child(hector), adult  Brayan  Current living Arrangements: house      Able to return to prior arrangements: yes       Family/Social Support:  Care provided by: self  Provides care for: no one  Marital Status:   Support system: Children          Description of Support System: Supportive, Involved         Current Resources:   Patient receiving home care services: No        Community Resources: None  Equipment currently used at home: walker, standard, cane, straight  Supplies currently used at home: None    Employment/Financial:  Employment Status: retired        Financial Concerns: none   Referral to Financial Worker: No       Does the patient's insurance plan have a 3 day qualifying hospital stay waiver?  No    Lifestyle & Psychosocial Needs:  Social Drivers of Health     Food Insecurity: Low Risk  (11/18/2024)    Food Insecurity     Within the past 12 months, did you worry that your food would run out before you got money to buy more?: No     Within the past 12 months, did the food you bought just not last  and you didn t have money to get more?: No   Depression: Not on file   Housing Stability: Low Risk  (11/18/2024)    Housing Stability     Do you have housing? : Yes     Are you worried about losing your housing?: No   Tobacco Use: Medium Risk (11/18/2024)    Patient History     Smoking Tobacco Use: Former     Smokeless Tobacco Use: Unknown     Passive Exposure: Not on file   Financial Resource Strain: Low Risk  (11/18/2024)    Financial Resource Strain     Within the past 12 months, have you or your family members you live with been unable to get utilities (heat, electricity) when it was really needed?: No   Alcohol Use: Not on file   Transportation Needs: Low Risk  (11/18/2024)    Transportation Needs     Within the past 12 months, has lack of transportation kept you from medical appointments, getting your medicines, non-medical meetings or appointments, work, or from getting things that you need?: No   Physical Activity: Not on file   Interpersonal Safety: Low Risk  (11/18/2024)    Interpersonal Safety     Do you feel physically and emotionally safe where you currently live?: Yes     Within the past 12 months, have you been hit, slapped, kicked or otherwise physically hurt by someone?: No     Within the past 12 months, have you been humiliated or emotionally abused in other ways by your partner or ex-partner?: No   Stress: Not on file   Social Connections: Not on file   Health Literacy: Not on file       Functional Status:  Prior to admission patient needed assistance:   Dependent ADLs:: Independent  Dependent IADLs:: Independent       Mental Health Status:  Mental Health Status: No Current Concerns       Chemical Dependency Status:  Chemical Dependency Status: No Current Concerns             Values/Beliefs:  Spiritual, Cultural Beliefs, Yazidi Practices, Values that affect care: no               Discussed  Partnership in Safe Discharge Planning  document with patient/family: Yes: pt and daughter verbalized  understanding.     Additional Information:  Alexandra Mckeon is a(n) 74 year old year old female who presented with Diverticulitis of colon with perforation [K57.20]  Acute sepsis (H) [A41.9].     CM spoke with pt and daughter, Hailey at bedside. Introduced self and role. Child(hector), Hailey, assisted with completing assessment. Patient lives in a(n) house with her son. Patient is independent with IADL/ADLs. Anticipated that patient will discharge to inpatient rehabilitation facility.    Pt is agreeable to TCU, list was given to pt and daughter. Family to transport upon discharge.     Pt denies any further needs at this time.    Contacts:  Extended Emergency Contact Information  Primary Emergency Contact: Hailey Toth  Mobile Phone: [phone]  Relation: Daughter  Secondary Emergency Contact: AMINAH MCKEON  Mobile Phone: [phone]  Relation: Spouse      Next Steps: get accepting TCU.    Henry Espinal RN

## 2024-11-21 ENCOUNTER — PATIENT OUTREACH (OUTPATIENT)
Dept: CARE COORDINATION | Facility: CLINIC | Age: 74
End: 2024-11-21

## 2024-11-21 ENCOUNTER — DOCUMENTATION ONLY (OUTPATIENT)
Dept: GERIATRICS | Facility: CLINIC | Age: 74
End: 2024-11-21
Payer: COMMERCIAL

## 2024-11-21 ENCOUNTER — TRANSITIONAL CARE UNIT VISIT (OUTPATIENT)
Dept: GERIATRICS | Facility: CLINIC | Age: 74
End: 2024-11-21
Payer: COMMERCIAL

## 2024-11-21 VITALS
BODY MASS INDEX: 30.18 KG/M2 | HEIGHT: 62 IN | HEART RATE: 89 BPM | OXYGEN SATURATION: 93 % | WEIGHT: 164 LBS | SYSTOLIC BLOOD PRESSURE: 142 MMHG | RESPIRATION RATE: 16 BRPM | DIASTOLIC BLOOD PRESSURE: 75 MMHG | TEMPERATURE: 98.5 F

## 2024-11-21 DIAGNOSIS — M62.81 GENERALIZED MUSCLE WEAKNESS: ICD-10-CM

## 2024-11-21 DIAGNOSIS — K59.01 SLOW TRANSIT CONSTIPATION: ICD-10-CM

## 2024-11-21 DIAGNOSIS — R10.32 ABDOMINAL PAIN, LEFT LOWER QUADRANT: ICD-10-CM

## 2024-11-21 DIAGNOSIS — K57.20 DIVERTICULITIS OF COLON WITH PERFORATION: Primary | ICD-10-CM

## 2024-11-21 DIAGNOSIS — K63.1 PERFORATED SIGMOID COLON (H): ICD-10-CM

## 2024-11-21 LAB — BACTERIA BLD CULT: NORMAL

## 2024-11-21 RX ORDER — METRONIDAZOLE 500 MG/1
500 TABLET ORAL 2 TIMES DAILY
Status: SHIPPED
Start: 2024-11-21 | End: 2024-11-21

## 2024-11-21 RX ORDER — CIPROFLOXACIN 500 MG/1
500 TABLET, FILM COATED ORAL EVERY 12 HOURS
Status: SHIPPED
Start: 2024-11-21 | End: 2024-12-01

## 2024-11-21 RX ORDER — METRONIDAZOLE 500 MG/1
500 TABLET ORAL 3 TIMES DAILY
Status: SHIPPED
Start: 2024-11-21 | End: 2024-12-01

## 2024-11-21 NOTE — PROGRESS NOTES
[facility]  [facility]P & CLINIC: Provider Not In System,    Chief Complaint   Patient presents with    Hospital F/U    [place] MRN: [id number]. Place of Service where encounter took place:  [facility] ([facility]) [[id number]] Alexandra Mckeon  is a 74 year old  (1950), admitted to the above facility from  [facility]. Hospital stay 11/17 through 11/20. Admitted to this facility for  rehab, medical management, and nursing care. HPI information obtained from: facility chart records, facility staff, patient report, [facility] chart review, and Care Everywhere [facility] chart review.     Brief Summary of Hospital Course: Alexandra presented to [facility] on 11/17 w/ abdominal pain, fever, weakness. She was found to have an acute perforated sigmoid diverticulitis. She was started on IV Flagyl and PO Cipro. No surgical intervention was recommended or required.    Updates since admission to transitional care unit: Alexandra presented to U on 11/20 s/p the above hospitalization. Today, Alexandra is feeling much better.  She denies any new abdominal pain, but does have some achiness in the left lower quadrant.  She denies any nausea or heartburn, and thinks that her appetite is improving.  Her bowels are a little bit sluggish, but she is passing gas.  She denies any bladder concerns such as burning or frequency.  She denies any fever or chills, denies shortness of breath, chest pain, palpitations.    CODE STATUS/ADVANCE DIRECTIVES DISCUSSION: DNR/DNI. Patient's living condition: lives with family, son . ALLERGIES: Aspirin, Cephalexin, Ibuprofen, Penicillin v, Penicillins, and Potassium PAST MEDICAL HISTORY:  has a past medical history of COPD (chronic obstructive pulmonary disease) (H), HLD (hyperlipidemia), and Hypertension.. PAST SURGICAL HISTORY:   has no past surgical history on file.. FAMILY HISTORY: family history is not on file.. SOCIAL HISTORY:   reports that she quit smoking about  5 years ago. Her smoking use included cigarettes. She started smoking about 54 years ago. She has a 49 pack-year smoking history. She does not have any smokeless tobacco history on file.  Post Discharge Medication Reconciliation Status: discharge medications reconciled and changed, per note/orders.  Current Outpatient Medications   Medication Sig Dispense Refill    acetaminophen (TYLENOL) 325 MG tablet Take 1 tablet (325 mg) by mouth every 4 hours as needed for mild pain or fever. 30 tablet 0    albuterol (PROAIR HFA/PROVENTIL HFA/VENTOLIN HFA) 108 (90 Base) MCG/ACT inhaler Inhale 1 puff into the lungs every 4 hours as needed.      calcium carbonate (TUMS) 500 MG chewable tablet Take 1 chew tab by mouth 4 times daily as needed for heartburn.      Calcium Magnesium Zinc 333-133-5 MG TABS Take 3 tablets by mouth daily.      ciprofloxacin (CIPRO) 500 MG tablet Take 1 tablet (500 mg) by mouth every 12 hours for 11 days. 22 tablet 0    cyanocobalamin (VITAMIN B-12) 1000 MCG tablet Take 1,000 mcg by mouth daily.      famotidine (PEPCID) 20 MG tablet Take 20 mg by mouth daily.      losartan (COZAAR) 50 MG tablet Take 25 mg by mouth daily.      metroNIDAZOLE (FLAGYL) 500 MG tablet Take 1 tablet (500 mg) by mouth 3 times daily for 11 days. 33 tablet 0    ondansetron (ZOFRAN ODT) 4 MG ODT tab Take 1 tablet (4 mg) by mouth every 6 hours as needed for nausea. 30 tablet 0    polyethylene glycol (MIRALAX) 17 GM/Dose powder Take 17 g by mouth daily. 510 g 0    potassium gluconate 2.5 MEQ tablet Take 1 tablet by mouth daily.      senna-docusate (SENOKOT-S/PERICOLACE) 8.6-50 MG tablet Take 1 tablet by mouth 2 times daily as needed for constipation. 60 tablet 0    simvastatin (ZOCOR) 20 MG tablet Take 20 mg by mouth at bedtime.      TRELEGY ELLIPTA 100-62.5-25 MCG/ACT oral inhaler Inhale 1 puff into the lungs daily.       ROS: 10 point ROS of systems including Constitutional, Eyes, Respiratory, Cardiovascular, Gastroenterology,  "Genitourinary, Integumentary, Musculoskeletal, Psychiatric were all negative except for pertinent positives noted in my HPI.  Vitals: BP (!) 142/75   Pulse 89   Temp 98.5  F (36.9  C)   Resp 16   Ht 1.575 m (5' 2\")   Wt 74.4 kg (164 lb)   SpO2 93%   BMI 30.00 kg/m    Exam:  GENERAL APPEARANCE: Alert, in no distress, cooperative.   ENT: Mouth/posterior oropharynx intact w/ moist mucous membranes, hearing acuity [place].  EYES: EOM, conjunctivae, lids, pupils and irises normal, PERRL2.   RESP: Respiratory effort good, no respiratory distress, Lung sounds clear. On RA.   CV: Auscultation of heart reveals S1, S2, rate and rhythm regular, no murmur, no rub or gallop, Edema 0+ BLE. Peripheral pulses are 2+.  ABDOMEN: Normal bowel sounds, soft, non-tender abdomen except for some mild tenderness in the left lower quadrant, and no masses palpated.  SKIN: Inspection/Palpation of skin and subcutaneous tissue baseline w/ fragility. No wounds/rashes noted.   NEURO: CN II-XII at patient's baseline, sensation baseline PPS.  PSYCH: Insight, judgement, and memory are baseline, affect and mood are happy/engaged.    Lab/Diagnostic data: Recent labs in [facility] reviewed by me today.     ASSESSMENT/PLAN:  Diverticulitis of colon with perforation  Perforated sigmoid colon (H)  Abdominal pain, left lower quadrant  Generalized muscle weakness  Slow transit constipation  Acute on chronic. Complex/Tenuous.   Provider reviewed records from hospitalization, facility, and interpreted most recent imaging/lab work (CBC, BMP), and vital signs, each with unique characteristics req from [facility].   Provider discussed care and management with daughter Hailey, discharging hospitalist Dr. Herring, rehab team, and nursing:   reports they will be scheduling a care conference likely w/in 72 hrs. Family are involved.  Therapies reporting initial assessment reveal Alexandra requires assist of 1 with a 2 wheeled walker, and she is otherwise still " being evaluated for initial assessments.  Nursing noting a discrepancy/concern around Flagyl dosing.  Patient/daughter Hailey were notified that Flagyl IV dosing was going to be stopped before leaving the hospital, but instead an IV was inserted upon admission to TCU, and IV Flagyl was started.  Provider personally discussed this case with discharging provider Dr. Herring, who clarified that Flagyl should be PO  Discontinue peripheral IV.  Discontinue IV Flagyl.  Start Flagyl PO as noted below.  Cipro and Flagyl will finish on 12/1.  No ongoing fever, appetite returning:  Trend CBC and BMP for ongoing diverticulitis.  Mild constipation likely, even with improved oral intake.  Start senna S as noted below.  Provider personally initiated a CODE STATUS and advance care planning discussion directly with daughter Hailey and Alexandra (separately).  Detailed chest compressions, a breathing tube, defibrillation, and both Alexandra and Hailey confirmed that Alexandra will remain a DNR/DNI.  Hailey discussed how her father actually was revived and on life support for about 10 days, and this was quite traumatizing for their family.  POLST signed at facility, and order entered into epic.  Follow up w/in 1 week or as needed.    Orders:  Discontinue IV Flagyl.  Discontinue PIV line.  Flagyl 500mg PO Q8h x 10 days (through 12/1). Dx: diverticulitis.   CBC, BMP x1 on 11/25. Dx: diverticulitis.  Senna S 1 tab PO BID. Dx: constipation (keep PRN as-is).     Electronically signed by:  Dr. Jessica Miller, APRN CNP DNP

## 2024-11-21 NOTE — LETTER
11/21/2024      Alexandra Mckeon  [address]  [place] [address]        [facility] Admission  PCP & CLINIC: Provider Not In System,    Chief Complaint   Patient presents with     Hospital F/U    [place] MRN: [id number]. Place of Service where encounter took place:  [facility] ([facility]) [[id number]] Alexandra Mckeon  is a 74 year old  (1950), admitted to the above facility from  [facility]. Hospital stay 11/17 through 11/20. Admitted to this facility for  rehab, medical management, and nursing care. HPI information obtained from: facility chart records, facility staff, patient report, [facility] chart review, and Care Everywhere [facility] chart review.     Brief Summary of Hospital Course: Alexandra presented to [facility] on 11/17 w/ abdominal pain, fever, weakness. She was found to have an acute perforated sigmoid diverticulitis. She was started on IV Flagyl and PO Cipro. No surgical intervention was recommended or required.    Updates since admission to transitional care unit: Alexandra presented to U on 11/20 s/p the above hospitalization. Today, Alexandra is feeling much better.  She denies any new abdominal pain, but does have some achiness in the left lower quadrant.  She denies any nausea or heartburn, and thinks that her appetite is improving.  Her bowels are a little bit sluggish, but she is passing gas.  She denies any bladder concerns such as burning or frequency.  She denies any fever or chills, denies shortness of breath, chest pain, palpitations.    CODE STATUS/ADVANCE DIRECTIVES DISCUSSION: DNR/DNI. Patient's living condition: lives with family, son . ALLERGIES: Aspirin, Cephalexin, Ibuprofen, Penicillin v, Penicillins, and Potassium PAST MEDICAL HISTORY:  has a past medical history of COPD (chronic obstructive pulmonary disease) (H), HLD (hyperlipidemia), and Hypertension.. PAST SURGICAL HISTORY:   has no past surgical history on file.. FAMILY HISTORY:  family history is not on file.. SOCIAL HISTORY:   reports that she quit smoking about 5 years ago. Her smoking use included cigarettes. She started smoking about 54 years ago. She has a 49 pack-year smoking history. She does not have any smokeless tobacco history on file.  Post Discharge Medication Reconciliation Status: discharge medications reconciled and changed, per note/orders.  Current Outpatient Medications   Medication Sig Dispense Refill     acetaminophen (TYLENOL) 325 MG tablet Take 1 tablet (325 mg) by mouth every 4 hours as needed for mild pain or fever. 30 tablet 0     albuterol (PROAIR HFA/PROVENTIL HFA/VENTOLIN HFA) 108 (90 Base) MCG/ACT inhaler Inhale 1 puff into the lungs every 4 hours as needed.       calcium carbonate (TUMS) 500 MG chewable tablet Take 1 chew tab by mouth 4 times daily as needed for heartburn.       Calcium Magnesium Zinc 333-133-5 MG TABS Take 3 tablets by mouth daily.       ciprofloxacin (CIPRO) 500 MG tablet Take 1 tablet (500 mg) by mouth every 12 hours for 11 days. 22 tablet 0     cyanocobalamin (VITAMIN B-12) 1000 MCG tablet Take 1,000 mcg by mouth daily.       famotidine (PEPCID) 20 MG tablet Take 20 mg by mouth daily.       losartan (COZAAR) 50 MG tablet Take 25 mg by mouth daily.       metroNIDAZOLE (FLAGYL) 500 MG tablet Take 1 tablet (500 mg) by mouth 3 times daily for 11 days. 33 tablet 0     ondansetron (ZOFRAN ODT) 4 MG ODT tab Take 1 tablet (4 mg) by mouth every 6 hours as needed for nausea. 30 tablet 0     polyethylene glycol (MIRALAX) 17 GM/Dose powder Take 17 g by mouth daily. 510 g 0     potassium gluconate 2.5 MEQ tablet Take 1 tablet by mouth daily.       senna-docusate (SENOKOT-S/PERICOLACE) 8.6-50 MG tablet Take 1 tablet by mouth 2 times daily as needed for constipation. 60 tablet 0     simvastatin (ZOCOR) 20 MG tablet Take 20 mg by mouth at bedtime.       TRELEGY ELLIPTA 100-62.5-25 MCG/ACT oral inhaler Inhale 1 puff into the lungs daily.       ROS: 10  "point ROS of systems including Constitutional, Eyes, Respiratory, Cardiovascular, Gastroenterology, Genitourinary, Integumentary, Musculoskeletal, Psychiatric were all negative except for pertinent positives noted in my HPI.  Vitals: BP (!) 142/75   Pulse 89   Temp 98.5  F (36.9  C)   Resp 16   Ht 1.575 m (5' 2\")   Wt 74.4 kg (164 lb)   SpO2 93%   BMI 30.00 kg/m    Exam:  GENERAL APPEARANCE: Alert, in no distress, cooperative.   ENT: Mouth/posterior oropharynx intact w/ moist mucous membranes, hearing acuity [place].  EYES: EOM, conjunctivae, lids, pupils and irises normal, PERRL2.   RESP: Respiratory effort good, no respiratory distress, Lung sounds clear. On RA.   CV: Auscultation of heart reveals S1, S2, rate and rhythm regular, no murmur, no rub or gallop, Edema 0+ BLE. Peripheral pulses are 2+.  ABDOMEN: Normal bowel sounds, soft, non-tender abdomen except for some mild tenderness in the left lower quadrant, and no masses palpated.  SKIN: Inspection/Palpation of skin and subcutaneous tissue baseline w/ fragility. No wounds/rashes noted.   NEURO: CN II-XII at patient's baseline, sensation baseline PPS.  PSYCH: Insight, judgement, and memory are baseline, affect and mood are happy/engaged.    Lab/Diagnostic data: Recent labs in [facility] reviewed by me today.     ASSESSMENT/PLAN:  Diverticulitis of colon with perforation  Perforated sigmoid colon (H)  Abdominal pain, left lower quadrant  Generalized muscle weakness  Slow transit constipation  Acute on chronic. Complex/Tenuous.   Provider reviewed records from hospitalization, facility, and interpreted most recent imaging/lab work (CBC, BMP), and vital signs, each with unique characteristics req from [facility].   Provider discussed care and management with daughter Hailey, discharging hospitalist Dr. Herring, rehab team, and nursing:   reports they will be scheduling a care conference likely w/in 72 hrs. Family are involved.  Therapies reporting initial " assessment reveal Alexandra requires assist of 1 with a 2 wheeled walker, and she is otherwise still being evaluated for initial assessments.  Nursing noting a discrepancy/concern around Flagyl dosing.  Patient/daughter Hailey were notified that Flagyl IV dosing was going to be stopped before leaving the hospital, but instead an IV was inserted upon admission to TCU, and IV Flagyl was started.  Provider personally discussed this case with discharging provider Dr. Herring, who clarified that Flagyl should be PO  Discontinue peripheral IV.  Discontinue IV Flagyl.  Start Flagyl PO as noted below.  Cipro and Flagyl will finish on 12/1.  No ongoing fever, appetite returning:  Trend CBC and BMP for ongoing diverticulitis.  Mild constipation likely, even with improved oral intake.  Start senna S as noted below.  Provider personally initiated a CODE STATUS and advance care planning discussion directly with daughter Hailey and Alexandra (separately).  Detailed chest compressions, a breathing tube, defibrillation, and both Alexandra and Hailey confirmed that Alexandra will remain a DNR/DNI.  Hailey discussed how her father actually was revived and on life support for about 10 days, and this was quite traumatizing for their family.  POLST signed at facility, and order entered into epic.  Follow up w/in 1 week or as needed.    Orders:  Discontinue IV Flagyl.  Discontinue PIV line.  Flagyl 500mg PO Q8h x 10 days (through 12/1). Dx: diverticulitis.   CBC, BMP x1 on 11/25. Dx: diverticulitis.  Senna S 1 tab PO BID. Dx: constipation (keep PRN as-is).     Electronically signed by:  PARVIN Blackburn CNP [facility]                      Sincerely,        PARVIN Garcia CNP

## 2024-11-21 NOTE — PROGRESS NOTES
[facility]    Background: Transitional Care Management program identified per system criteria and reviewed by [facility] team for possible outreach.    Assessment: Upon chart review, CCRC Team member will not proceed with patient outreach related to this episode of Transitional Care Management program due to reason below:    Non-[facility] TCU: CCRC team member noted patient discharged to TCU/ARU/LTACH. Patient is not established with a [facility] currently supported by Primary Care-Care Coordination therefore handoff to Primary Care-Care Coordination is not appropriate at this time.    Plan: Transitional Care Management episode addressed appropriately per reason noted above.      CLINT Shaffer  [phone]  [facility]     *Connected Care Resource Team does NOT follow patient ongoing. Referrals are identified based on internal discharge reports and the outreach is to ensure patient has an understanding of their discharge instructions.

## 2024-11-21 NOTE — PROGRESS NOTES
Physical Therapy Discharge Summary    Reason for therapy discharge:    Discharged to transitional care facility.    Progress towards therapy goal(s). See goals on Care Plan in [facility] electronic health record for goal details.  Goals not met.  Barriers to achieving goals:   limited tolerance for therapy.    Therapy recommendation(s):    Further rehab recommended to improve functional mobility and safety

## 2024-11-22 ENCOUNTER — LAB REQUISITION (OUTPATIENT)
Dept: LAB | Facility: CLINIC | Age: 74
End: 2024-11-22
Payer: COMMERCIAL

## 2024-11-22 DIAGNOSIS — R42 DIZZINESS AND GIDDINESS: ICD-10-CM

## 2024-11-22 DIAGNOSIS — I25.10 ATHEROSCLEROTIC HEART DISEASE OF NATIVE CORONARY ARTERY WITHOUT ANGINA PECTORIS: ICD-10-CM

## 2024-11-23 LAB — BACTERIA BLD CULT: NO GROWTH

## 2024-11-25 LAB
ANION GAP SERPL CALCULATED.3IONS-SCNC: 9 MMOL/L (ref 7–15)
BASOPHILS # BLD AUTO: 0.1 10E3/UL (ref 0–0.2)
BASOPHILS NFR BLD AUTO: 1 %
BUN SERPL-MCNC: 8.5 MG/DL (ref 8–23)
CALCIUM SERPL-MCNC: 9.2 MG/DL (ref 8.8–10.4)
CHLORIDE SERPL-SCNC: 103 MMOL/L (ref 98–107)
CREAT SERPL-MCNC: 0.89 MG/DL (ref 0.51–0.95)
EGFRCR SERPLBLD CKD-EPI 2021: 67 ML/MIN/1.73M2
EOSINOPHIL # BLD AUTO: 0.4 10E3/UL (ref 0–0.7)
EOSINOPHIL NFR BLD AUTO: 5 %
ERYTHROCYTE [DISTWIDTH] IN BLOOD BY AUTOMATED COUNT: 12.9 % (ref 10–15)
GLUCOSE SERPL-MCNC: 108 MG/DL (ref 70–99)
HCO3 SERPL-SCNC: 27 MMOL/L (ref 22–29)
HCT VFR BLD AUTO: 38.7 % (ref 35–47)
HGB BLD-MCNC: 12.3 G/DL (ref 11.7–15.7)
IMM GRANULOCYTES # BLD: 0.2 10E3/UL
IMM GRANULOCYTES NFR BLD: 2 %
LYMPHOCYTES # BLD AUTO: 1.6 10E3/UL (ref 0.8–5.3)
LYMPHOCYTES NFR BLD AUTO: 21 %
MCH RBC QN AUTO: 28.7 PG (ref 26.5–33)
MCHC RBC AUTO-ENTMCNC: 31.8 G/DL (ref 31.5–36.5)
MCV RBC AUTO: 90 FL (ref 78–100)
MONOCYTES # BLD AUTO: 0.8 10E3/UL (ref 0–1.3)
MONOCYTES NFR BLD AUTO: 11 %
NEUTROPHILS # BLD AUTO: 4.5 10E3/UL (ref 1.6–8.3)
NEUTROPHILS NFR BLD AUTO: 60 %
NRBC # BLD AUTO: 0 10E3/UL
NRBC BLD AUTO-RTO: 0 /100
PLATELET # BLD AUTO: 323 10E3/UL (ref 150–450)
POTASSIUM SERPL-SCNC: 4 MMOL/L (ref 3.4–5.3)
RBC # BLD AUTO: 4.28 10E6/UL (ref 3.8–5.2)
SODIUM SERPL-SCNC: 139 MMOL/L (ref 135–145)
WBC # BLD AUTO: 7.4 10E3/UL (ref 4–11)

## 2024-11-25 PROCEDURE — 85025 COMPLETE CBC W/AUTO DIFF WBC: CPT | Performed by: FAMILY MEDICINE

## 2024-11-25 PROCEDURE — P9603 ONE-WAY ALLOW PRORATED MILES: HCPCS | Performed by: FAMILY MEDICINE

## 2024-11-25 PROCEDURE — 36415 COLL VENOUS BLD VENIPUNCTURE: CPT | Performed by: FAMILY MEDICINE

## 2024-11-25 PROCEDURE — 84520 ASSAY OF UREA NITROGEN: CPT | Performed by: FAMILY MEDICINE

## 2024-11-25 PROCEDURE — 80048 BASIC METABOLIC PNL TOTAL CA: CPT | Performed by: FAMILY MEDICINE

## 2024-11-26 ENCOUNTER — TRANSITIONAL CARE UNIT VISIT (OUTPATIENT)
Dept: GERIATRICS | Facility: CLINIC | Age: 74
End: 2024-11-26
Payer: COMMERCIAL

## 2024-11-26 VITALS
SYSTOLIC BLOOD PRESSURE: 123 MMHG | RESPIRATION RATE: 18 BRPM | HEIGHT: 62 IN | WEIGHT: 164 LBS | HEART RATE: 79 BPM | TEMPERATURE: 97.5 F | BODY MASS INDEX: 30.18 KG/M2 | DIASTOLIC BLOOD PRESSURE: 68 MMHG | OXYGEN SATURATION: 92 %

## 2024-11-26 DIAGNOSIS — K63.1 PERFORATED SIGMOID COLON (H): ICD-10-CM

## 2024-11-26 DIAGNOSIS — A41.9 ACUTE SEPSIS (H): ICD-10-CM

## 2024-11-26 DIAGNOSIS — K59.01 SLOW TRANSIT CONSTIPATION: ICD-10-CM

## 2024-11-26 DIAGNOSIS — R10.32 ABDOMINAL PAIN, LEFT LOWER QUADRANT: ICD-10-CM

## 2024-11-26 DIAGNOSIS — K57.20 DIVERTICULITIS OF COLON WITH PERFORATION: Primary | ICD-10-CM

## 2024-11-26 DIAGNOSIS — M62.81 GENERALIZED MUSCLE WEAKNESS: ICD-10-CM

## 2024-11-26 PROCEDURE — 99310 SBSQ NF CARE HIGH MDM 45: CPT | Performed by: NURSE PRACTITIONER

## 2024-11-26 NOTE — PROGRESS NOTES
[facility]  PCP & CLINIC: Provider Not In System,    No chief complaint on file.   [place] MRN: [id number]. Place of Service where encounter took place:  [facility] (U) [[id number]] Alexandra Mckeon  is a 74 year old  (1950), admitted to the above facility from  [facility]. Hospital stay 11/17 through 11/20. Admitted to this facility for  rehab, medical management, and nursing care. HPI information obtained from: facility chart records, facility staff, patient report, [facility] chart review, and Care Everywhere [facility] chart review.     Brief Summary of Hospital Course: Alexandra presented to [facility] on 11/17 w/ abdominal pain, fever, weakness. She was found to have an acute perforated sigmoid diverticulitis. She was started on IV Flagyl and PO Cipro. No surgical intervention was recommended or required.    Updates since admission to transitional care unit: Alexandra presented to [facility] on 11/20 s/p the above hospitalization.     Seen today for follow up TCU visit:   1. Diverticulitis of colon with perforation    2. Perforated sigmoid colon (H)    3. Abdominal pain, left lower quadrant    4. Slow transit constipation    5. Acute sepsis (H)    6. Generalized muscle weakness          Today:  Offers no concerns today, doing well. Continues on oral abx and tolerating well. No fevers, chills. No concerns with troubles breathing or SOB. No abdominal pain. She denies any nausea or heartburn. Tolerating regular diet. Constipation has resolved but wants to keep stool softeners.   She denies any bladder concerns such as burning or frequency.  Participating in therapies.     CODE STATUS/ADVANCE DIRECTIVES DISCUSSION: DNR/DNI. Patient's living condition: lives with family, son . ALLERGIES: Aspirin, Cephalexin, Ibuprofen, Penicillin v, Penicillins, and Potassium PAST MEDICAL HISTORY:  has a past medical history of COPD (chronic obstructive pulmonary disease) (H), HLD  (hyperlipidemia), and Hypertension.. PAST SURGICAL HISTORY:   has no past surgical history on file.. FAMILY HISTORY: family history is not on file.. SOCIAL HISTORY:   reports that she quit smoking about 5 years ago. Her smoking use included cigarettes. She started smoking about 54 years ago. She has a 49 pack-year smoking history. She does not have any smokeless tobacco history on file.  Post Discharge Medication Reconciliation Status: discharge medications reconciled and changed, per note/orders.  Current Outpatient Medications   Medication Sig Dispense Refill    acetaminophen (TYLENOL) 325 MG tablet Take 1 tablet (325 mg) by mouth every 4 hours as needed for mild pain or fever. 30 tablet 0    albuterol (PROAIR HFA/PROVENTIL HFA/VENTOLIN HFA) 108 (90 Base) MCG/ACT inhaler Inhale 1 puff into the lungs every 4 hours as needed.      calcium carbonate (TUMS) 500 MG chewable tablet Take 1 chew tab by mouth 4 times daily as needed for heartburn.      Calcium Magnesium Zinc 333-133-5 MG TABS Take 3 tablets by mouth daily.      ciprofloxacin (CIPRO) 500 MG tablet Take 1 tablet (500 mg) by mouth every 12 hours for 10 days.      cyanocobalamin (VITAMIN B-12) 1000 MCG tablet Take 1,000 mcg by mouth daily.      famotidine (PEPCID) 20 MG tablet Take 20 mg by mouth daily.      losartan (COZAAR) 50 MG tablet Take 25 mg by mouth daily.      metroNIDAZOLE (FLAGYL) 500 MG tablet Take 1 tablet (500 mg) by mouth 3 times daily for 10 days.      ondansetron (ZOFRAN ODT) 4 MG ODT tab Take 1 tablet (4 mg) by mouth every 6 hours as needed for nausea. 30 tablet 0    polyethylene glycol (MIRALAX) 17 GM/Dose powder Take 17 g by mouth daily. 510 g 0    potassium gluconate 2.5 MEQ tablet Take 1 tablet by mouth daily.      senna-docusate (SENOKOT-S/PERICOLACE) 8.6-50 MG tablet Take 1 tablet by mouth 2 times daily as needed for constipation. 60 tablet 0    simvastatin (ZOCOR) 20 MG tablet Take 20 mg by mouth at bedtime.      TRELEGY ELLIPTA  "100-62.5-25 MCG/ACT oral inhaler Inhale 1 puff into the lungs daily.       ROS: 10 point ROS of systems including Constitutional, Eyes, Respiratory, Cardiovascular, Gastroenterology, Genitourinary, Integumentary, Musculoskeletal, Psychiatric were all negative except for pertinent positives noted in my HPI.      Vitals: /68   Pulse 79   Temp 97.5  F (36.4  C)   Resp 18   Ht 1.575 m (5' 2\")   Wt 74.4 kg (164 lb)   SpO2 92%   BMI 30.00 kg/m    Exam:  GENERAL APPEARANCE: Alert, in no distress, cooperative. Sitting comfortably in bed.   ENT: Mouth/posterior oropharynx intact w/ moist mucous membranes, hearing acuity [place].  EYES: EOM, conjunctivae, lids, pupils and irises normal, PERRL2.   RESP: Respiratory effort good, no respiratory distress, Lung sounds clear. On RA.   CV: Auscultation of heart reveals S1, S2, rate and rhythm regular, no murmur, no rub or gallop, Edema trace to BLE. Peripheral pulses are 2+.  ABDOMEN: Normal bowel sounds, soft, non-tender abdomen except for some mild tenderness in the left lower quadrant, and no masses palpated.  SKIN: Inspection/Palpation of skin and subcutaneous tissue baseline w/ fragility. No wounds/rashes noted.   NEURO: CN II-XII at patient's baseline, sensation baseline PPS.  PSYCH: Insight, judgement, and memory are baseline, affect and mood are happy/engaged.    Lab/Diagnostic data: Recent labs in [facility] reviewed by me today.     Last Comprehensive Metabolic Panel:  Lab Results   Component Value Date     11/25/2024    POTASSIUM 4.0 11/25/2024    CHLORIDE 103 11/25/2024    CO2 27 11/25/2024    ANIONGAP 9 11/25/2024     (H) 11/25/2024    BUN 8.5 11/25/2024    CR 0.89 11/25/2024    GFRESTIMATED 67 11/25/2024    NU 9.2 11/25/2024       Lab Results   Component Value Date    WBC 7.4 11/25/2024     Lab Results   Component Value Date    RBC 4.28 11/25/2024     Lab Results   Component Value Date    HGB 12.3 11/25/2024     Lab Results   Component Value Date    " HCT 38.7 11/25/2024     Lab Results   Component Value Date    MCV 90 11/25/2024     Lab Results   Component Value Date    MCH 28.7 11/25/2024     Lab Results   Component Value Date    MCHC 31.8 11/25/2024     Lab Results   Component Value Date    RDW 12.9 11/25/2024     Lab Results   Component Value Date     11/25/2024         ASSESSMENT/PLAN:  Diverticulitis of colon with perforation  Perforated sigmoid colon (H)  Abdominal pain, left lower quadrant  Generalized muscle weakness  Slow transit constipation  Acute on chronic. Complex/Tenuous.   Provider reviewed records from hospitalization, facility, and interpreted most recent imaging/lab work (CBC, BMP), and vital signs, each with unique characteristics req from [facility].   Provider discussed care and management rehab team,  and nursing:   reports they will be scheduling a care conference likely w/in 72 hrs. Family are involved.  Therapies reporting initial assessment reveal Alexandra requires assist of 1 with a 2 wheeled walker, and she is otherwise still being evaluated for initial assessments.    Cipro and Flagyl will finish on 12/1.  No ongoing fever, appetite returning:  Trend CBC and BMP for ongoing diverticulitis. Labs reviewed today and stable.   Constipation resolved.     Follow up w/in 1 week or as needed.      Continue Flagyl 500mg PO Q8h x 10 days (through 12/1). Dx: diverticulitis.   CBC, BMP x1 on 11/25. Dx: diverticulitis.Labs reviewed and stable today  Senna S 1 tab PO BID. Dx: constipation (keep PRN as-is). Continue as helping.         Electronically signed by  PARVIN Nelson, GNP/ANP-BC      Total time greater than 45 minutes reviewing chart in EPIC and PCC, medications, labs, vitals. Discussing with social work, physical therapy, occupational therapy and nursing.

## 2024-11-26 NOTE — LETTER
11/26/2024      Alexandra Mckeon  [address]  [place] [address]        [facility]  [facility]P & CLINIC: Provider Not In System,    No chief complaint on file.   Leonard MRN: [id number]. Place of Service where encounter took place:  [facility] (U) [[id number]] Alexandra Mckeon  is a 74 year old  (1950), admitted to the above facility from  [facility]. Hospital stay 11/17 through 11/20. Admitted to this facility for  rehab, medical management, and nursing care. HPI information obtained from: facility chart records, facility staff, patient report, [facility] chart review, and Care Everywhere [facility] chart review.     Brief Summary of Hospital Course: Alexandra presented to [facility] on 11/17 w/ abdominal pain, fever, weakness. She was found to have an acute perforated sigmoid diverticulitis. She was started on IV Flagyl and PO Cipro. No surgical intervention was recommended or required.    Updates since admission to transitional care unit: Alexandra presented to TCU on 11/20 s/p the above hospitalization.     Seen today for follow up TCU visit:   1. Diverticulitis of colon with perforation    2. Perforated sigmoid colon (H)    3. Abdominal pain, left lower quadrant    4. Slow transit constipation    5. Acute sepsis (H)    6. Generalized muscle weakness          Today:  Offers no concerns today, doing well. Continues on oral abx and tolerating well. No fevers, chills. No concerns with troubles breathing or SOB. No abdominal pain. She denies any nausea or heartburn. Tolerating regular diet. Constipation has resolved but wants to keep stool softeners.   She denies any bladder concerns such as burning or frequency.  Participating in therapies.     CODE STATUS/ADVANCE DIRECTIVES DISCUSSION: DNR/DNI. Patient's living condition: lives with family, son . ALLERGIES: Aspirin, Cephalexin, Ibuprofen, Penicillin v, Penicillins, and Potassium PAST MEDICAL HISTORY:  has a  past medical history of COPD (chronic obstructive pulmonary disease) (H), HLD (hyperlipidemia), and Hypertension.. PAST SURGICAL HISTORY:   has no past surgical history on file.. FAMILY HISTORY: family history is not on file.. SOCIAL HISTORY:   reports that she quit smoking about 5 years ago. Her smoking use included cigarettes. She started smoking about 54 years ago. She has a 49 pack-year smoking history. She does not have any smokeless tobacco history on file.  Post Discharge Medication Reconciliation Status: discharge medications reconciled and changed, per note/orders.  Current Outpatient Medications   Medication Sig Dispense Refill     acetaminophen (TYLENOL) 325 MG tablet Take 1 tablet (325 mg) by mouth every 4 hours as needed for mild pain or fever. 30 tablet 0     albuterol (PROAIR HFA/PROVENTIL HFA/VENTOLIN HFA) 108 (90 Base) MCG/ACT inhaler Inhale 1 puff into the lungs every 4 hours as needed.       calcium carbonate (TUMS) 500 MG chewable tablet Take 1 chew tab by mouth 4 times daily as needed for heartburn.       Calcium Magnesium Zinc 333-133-5 MG TABS Take 3 tablets by mouth daily.       ciprofloxacin (CIPRO) 500 MG tablet Take 1 tablet (500 mg) by mouth every 12 hours for 10 days.       cyanocobalamin (VITAMIN B-12) 1000 MCG tablet Take 1,000 mcg by mouth daily.       famotidine (PEPCID) 20 MG tablet Take 20 mg by mouth daily.       losartan (COZAAR) 50 MG tablet Take 25 mg by mouth daily.       metroNIDAZOLE (FLAGYL) 500 MG tablet Take 1 tablet (500 mg) by mouth 3 times daily for 10 days.       ondansetron (ZOFRAN ODT) 4 MG ODT tab Take 1 tablet (4 mg) by mouth every 6 hours as needed for nausea. 30 tablet 0     polyethylene glycol (MIRALAX) 17 GM/Dose powder Take 17 g by mouth daily. 510 g 0     potassium gluconate 2.5 MEQ tablet Take 1 tablet by mouth daily.       senna-docusate (SENOKOT-S/PERICOLACE) 8.6-50 MG tablet Take 1 tablet by mouth 2 times daily as needed for constipation. 60 tablet 0      "simvastatin (ZOCOR) 20 MG tablet Take 20 mg by mouth at bedtime.       TRELEGY ELLIPTA 100-62.5-25 MCG/ACT oral inhaler Inhale 1 puff into the lungs daily.       ROS: 10 point ROS of systems including Constitutional, Eyes, Respiratory, Cardiovascular, Gastroenterology, Genitourinary, Integumentary, Musculoskeletal, Psychiatric were all negative except for pertinent positives noted in my HPI.      Vitals: /68   Pulse 79   Temp 97.5  F (36.4  C)   Resp 18   Ht 1.575 m (5' 2\")   Wt 74.4 kg (164 lb)   SpO2 92%   BMI 30.00 kg/m    Exam:  GENERAL APPEARANCE: Alert, in no distress, cooperative. Sitting comfortably in bed.   ENT: Mouth/posterior oropharynx intact w/ moist mucous membranes, hearing acuity [place].  EYES: EOM, conjunctivae, lids, pupils and irises normal, PERRL2.   RESP: Respiratory effort good, no respiratory distress, Lung sounds clear. On RA.   CV: Auscultation of heart reveals S1, S2, rate and rhythm regular, no murmur, no rub or gallop, Edema trace to BLE. Peripheral pulses are 2+.  ABDOMEN: Normal bowel sounds, soft, non-tender abdomen except for some mild tenderness in the left lower quadrant, and no masses palpated.  SKIN: Inspection/Palpation of skin and subcutaneous tissue baseline w/ fragility. No wounds/rashes noted.   NEURO: CN II-XII at patient's baseline, sensation baseline PPS.  PSYCH: Insight, judgement, and memory are baseline, affect and mood are happy/engaged.    Lab/Diagnostic data: Recent labs in [facility] reviewed by me today.     Last Comprehensive Metabolic Panel:  Lab Results   Component Value Date     11/25/2024    POTASSIUM 4.0 11/25/2024    CHLORIDE 103 11/25/2024    CO2 27 11/25/2024    ANIONGAP 9 11/25/2024     (H) 11/25/2024    BUN 8.5 11/25/2024    CR 0.89 11/25/2024    GFRESTIMATED 67 11/25/2024    NU 9.2 11/25/2024       Lab Results   Component Value Date    WBC 7.4 11/25/2024     Lab Results   Component Value Date    RBC 4.28 11/25/2024     Lab Results "   Component Value Date    HGB 12.3 11/25/2024     Lab Results   Component Value Date    HCT 38.7 11/25/2024     Lab Results   Component Value Date    MCV 90 11/25/2024     Lab Results   Component Value Date    MCH 28.7 11/25/2024     Lab Results   Component Value Date    MCHC 31.8 11/25/2024     Lab Results   Component Value Date    RDW 12.9 11/25/2024     Lab Results   Component Value Date     11/25/2024         ASSESSMENT/PLAN:  Diverticulitis of colon with perforation  Perforated sigmoid colon (H)  Abdominal pain, left lower quadrant  Generalized muscle weakness  Slow transit constipation  Acute on chronic. Complex/Tenuous.   Provider reviewed records from hospitalization, facility, and interpreted most recent imaging/lab work (CBC, BMP), and vital signs, each with unique characteristics req from [facility].   Provider discussed care and management rehab team,  and nursing:   reports they will be scheduling a care conference likely w/in 72 hrs. Family are involved.  Therapies reporting initial assessment reveal Alexandra requires assist of 1 with a 2 wheeled walker, and she is otherwise still being evaluated for initial assessments.    Cipro and Flagyl will finish on 12/1.  No ongoing fever, appetite returning:  Trend CBC and BMP for ongoing diverticulitis. Labs reviewed today and stable.   Constipation resolved.     Follow up w/in 1 week or as needed.      Continue Flagyl 500mg PO Q8h x 10 days (through 12/1). Dx: diverticulitis.   CBC, BMP x1 on 11/25. Dx: diverticulitis.Labs reviewed and stable today  Senna S 1 tab PO BID. Dx: constipation (keep PRN as-is). Continue as helping.         Electronically signed by  PARVIN Nelson, GNP/ANP-BC      Total time greater than 45 minutes reviewing chart in EPIC and PCC, medications, labs, vitals. Discussing with social work, physical therapy, occupational therapy and nursing.                         Sincerely,        Vikki Dixon, CNP

## 2024-12-03 ENCOUNTER — DISCHARGE SUMMARY NURSING HOME (OUTPATIENT)
Dept: GERIATRICS | Facility: CLINIC | Age: 74
End: 2024-12-03
Payer: COMMERCIAL

## 2024-12-03 VITALS
HEART RATE: 77 BPM | WEIGHT: 156 LBS | TEMPERATURE: 97.7 F | SYSTOLIC BLOOD PRESSURE: 115 MMHG | BODY MASS INDEX: 28.71 KG/M2 | DIASTOLIC BLOOD PRESSURE: 67 MMHG | RESPIRATION RATE: 18 BRPM | HEIGHT: 62 IN | OXYGEN SATURATION: 95 %

## 2024-12-03 DIAGNOSIS — A41.9 ACUTE SEPSIS (H): ICD-10-CM

## 2024-12-03 DIAGNOSIS — M62.81 GENERALIZED MUSCLE WEAKNESS: ICD-10-CM

## 2024-12-03 DIAGNOSIS — K57.20 DIVERTICULITIS OF COLON WITH PERFORATION: Primary | ICD-10-CM

## 2024-12-03 DIAGNOSIS — R10.32 ABDOMINAL PAIN, LEFT LOWER QUADRANT: ICD-10-CM

## 2024-12-03 DIAGNOSIS — K59.01 SLOW TRANSIT CONSTIPATION: ICD-10-CM

## 2024-12-03 DIAGNOSIS — K63.1 PERFORATED SIGMOID COLON (H): ICD-10-CM

## 2024-12-03 PROCEDURE — 99316 NF DSCHRG MGMT 30 MIN+: CPT | Performed by: NURSE PRACTITIONER

## 2024-12-03 NOTE — PROGRESS NOTES
[facility] GERIATRICS DISCHARGE SUMMARY  PATIENT'S NAME: Alexandra Mckeon  YOB: 1950  MEDICAL RECORD NUMBER:  [id number]  Place of Service where encounter took place:  [facility] (TCU) [[id number]]    PRIMARY CARE PROVIDER AND CLINIC RESPONSIBLE AFTER TRANSFER:   Provider Not In System,      Nursing Home: [facility] TCU      Transferring providers: Vikki Dixon CNP, Haylie Taveras MD  Recent Hospitalization/ED:  [facility] stay 11/17/2024 to 11/20/2024.  Date of SNF Admission:  11/20/2024  Date of SNF (anticipated) Discharge:  12/3/24  Discharged to: previous independent home  Cognitive Scores:  see therapy scores   Physical Function:  see therapy notes   DME: No DME needed    CODE STATUS/ADVANCE DIRECTIVES DISCUSSION:  No CPR- Do NOT Intubate   ALLERGIES: Aspirin, Cephalexin, Ibuprofen, Penicillin v, Penicillins, and Potassium    NURSING FACILITY COURSE   Medication Changes/Rationale:   See below     Summary of nursing facility stay:   Diverticulitis of colon with perforation  Perforated sigmoid colon (H)  Abdominal pain, left lower quadrant  Generalized muscle weakness  Slow transit constipation  Acute on chronic. Complex/Tenuous.   Provider reviewed records from hospitalization, facility, and interpreted most recent imaging/lab work (CBC, BMP), and vital signs, each with unique characteristics req from [facility].   Provider discussed care and management rehab team, SW and nursing:  Participating in therapies.      Cipro and Flagyl will finish on 12/1.  No ongoing fever, appetite returning:  Trend CBC and BMP for ongoing diverticulitis. Labs reviewed today and stable.   Constipation resolved.     Discharge Medications:  MED REC REQUIRED  Post Medication Reconciliation Status: discharge medications reconciled, continue medications without change       Current Outpatient Medications   Medication Sig Dispense Refill    acetaminophen  "(TYLENOL) 325 MG tablet Take 1 tablet (325 mg) by mouth every 4 hours as needed for mild pain or fever. 30 tablet 0    albuterol (PROAIR HFA/PROVENTIL HFA/VENTOLIN HFA) 108 (90 Base) MCG/ACT inhaler Inhale 1 puff into the lungs every 4 hours as needed.      calcium carbonate (TUMS) 500 MG chewable tablet Take 1 chew tab by mouth 4 times daily as needed for heartburn.      Calcium Magnesium Zinc 333-133-5 MG TABS Take 3 tablets by mouth daily.      cyanocobalamin (VITAMIN B-12) 1000 MCG tablet Take 1,000 mcg by mouth daily.      famotidine (PEPCID) 20 MG tablet Take 20 mg by mouth daily.      losartan (COZAAR) 50 MG tablet Take 25 mg by mouth daily.      ondansetron (ZOFRAN ODT) 4 MG ODT tab Take 1 tablet (4 mg) by mouth every 6 hours as needed for nausea. 30 tablet 0    polyethylene glycol (MIRALAX) 17 GM/Dose powder Take 17 g by mouth daily. 510 g 0    potassium gluconate 2.5 MEQ tablet Take 1 tablet by mouth daily.      senna-docusate (SENOKOT-S/PERICOLACE) 8.6-50 MG tablet Take 1 tablet by mouth 2 times daily as needed for constipation. 60 tablet 0    simvastatin (ZOCOR) 20 MG tablet Take 20 mg by mouth at bedtime.      TRELEGY ELLIPTA 100-62.5-25 MCG/ACT oral inhaler Inhale 1 puff into the lungs daily.            Controlled medications:   not applicable/none     Past Medical History:   Past Medical History:   Diagnosis Date    COPD (chronic obstructive pulmonary disease) (H)     HLD (hyperlipidemia)     Hypertension      Physical Exam:   Vitals: /67   Pulse 77   Temp 97.7  F (36.5  C)   Resp 18   Ht 1.575 m (5' 2\")   Wt 70.8 kg (156 lb)   SpO2 95%   BMI 28.53 kg/m    BMI: Body mass index is 28.53 kg/m .  GENERAL APPEARANCE:  Alert, in no distress, oriented, cooperative  ENT:  Mouth and posterior oropharynx normal, moist mucous membranes  EYES:  EOM, conjunctivae, lids, pupils and irises normal  NECK:  No adenopathy,masses or thyromegaly  RESP:  respiratory effort and palpation of chest normal, lungs " clear to auscultation , no respiratory distress  CV:  Palpation and auscultation of heart done , regular rate and rhythm, no murmur, rub, or gallop, no edema to LE  GI/ABDOMEN:  normal bowel sounds, soft, nontender, no hepatosplenomegaly or other masses  M/S:   moves extremities spontaneously. Ambulating with walker   SKIN:  no rashes to exposed skin.   NEURO:   intact   PSYCH:  oriented X 3, normal insight, judgement and memory, affect and mood normal,        SNF labs: Recent labs in [facility] reviewed by me today.     DISCHARGE PLAN:  Follow up labs: As directed by PCP   Medical Follow Up:      Follow up with primary care provider in 5-7 days  [facility] scheduled appointments:  Appointments in Next Year      Dec 03, 2024 8:00 AM  Discharge Summary with Vikki Dixon CNP  [facility] ([facility] ) [phone]     Dec 23, 2024 11:10 AM  (Arrive by 10:55 AM)  New Patient with Bismark Lopez DO  [facility] [place] ([facility]) [phone]         \  Discharge Services: Home Care:  Occupational Therapy and Physical Therapy  Discharge Instructions Verbalized to Patient at Discharge:   None    TOTAL DISCHARGE TIME:   Greater than 30 minutes  Electronically signed by:  Vikki Dixon CNP     Documentation of Face to Face and Certification for Home Health Services    I certify that services are/were furnished while this patient was under the care of a physician and that a physician or an allowed non-physician practitioner (NPP), had a face-to-face encounter that meets the physician face-to-face encounter requirements. The encounter was in whole, or in part, related to the primary reason for home health. The patient is confined to his/her home and needs intermittent skilled nursing, physical therapy, speech-language pathology, or the continued need for occupational therapy. A plan of care has been  established by a physician and is periodically reviewed by a physician.  Date of Face-to-Face Encounter: 12/3/2024.    I certify that, based on my findings, the following services are medically necessary home health services: Occupational Therapy and Physical Therapy.    My clinical findings support the need for the above skilled services because: Requires assistance of another person or specialized equipment to access medical services because patient: Requires supervision of another for safe transfer...    Patient to re-establish plan of care with their PCP within 7-10 days after leaving the facility to reestablish care.  Medicare certified PECOS provider: Vikki Dixon CNP  Date: December 3, 2024

## 2024-12-03 NOTE — LETTER
12/3/2024      Alexandra Mckeon  [address]  [place] [address]        [facility] GERIATRICS DISCHARGE SUMMARY  PATIENT'S NAME: Alexandra Mckeon  YOB: 1950  MEDICAL RECORD NUMBER:  [id number]  Place of Service where encounter took place:  [facility] (TCU) [[id number]]    PRIMARY CARE PROVIDER AND CLINIC RESPONSIBLE AFTER TRANSFER:   Provider Not In System,      Nursing Home: [facility] TCU      Transferring providers: Vikki Dixon CNP, Haylie Taveras MD  Recent Hospitalization/ED:  [facility] Hospital stay 11/17/2024 to 11/20/2024.  Date of SNF Admission:  11/20/2024  Date of SNF (anticipated) Discharge:  12/3/24  Discharged to: previous independent home  Cognitive Scores:  see therapy scores   Physical Function:  see therapy notes   DME: No DME needed    CODE STATUS/ADVANCE DIRECTIVES DISCUSSION:  No CPR- Do NOT Intubate   ALLERGIES: Aspirin, Cephalexin, Ibuprofen, Penicillin v, Penicillins, and Potassium    NURSING FACILITY COURSE   Medication Changes/Rationale:   See below     Summary of nursing facility stay:   Diverticulitis of colon with perforation  Perforated sigmoid colon (H)  Abdominal pain, left lower quadrant  Generalized muscle weakness  Slow transit constipation  Acute on chronic. Complex/Tenuous.   Provider reviewed records from hospitalization, facility, and interpreted most recent imaging/lab work (CBC, BMP), and vital signs, each with unique characteristics req from [facility].   Provider discussed care and management rehab team, SW and nursing:  Participating in therapies.      Cipro and Flagyl will finish on 12/1.  No ongoing fever, appetite returning:  Trend CBC and BMP for ongoing diverticulitis. Labs reviewed today and stable.   Constipation resolved.     Discharge Medications:  MED REC REQUIRED  Post Medication Reconciliation Status: discharge medications reconciled, continue medications without change       Current  "Outpatient Medications   Medication Sig Dispense Refill     acetaminophen (TYLENOL) 325 MG tablet Take 1 tablet (325 mg) by mouth every 4 hours as needed for mild pain or fever. 30 tablet 0     albuterol (PROAIR HFA/PROVENTIL HFA/VENTOLIN HFA) 108 (90 Base) MCG/ACT inhaler Inhale 1 puff into the lungs every 4 hours as needed.       calcium carbonate (TUMS) 500 MG chewable tablet Take 1 chew tab by mouth 4 times daily as needed for heartburn.       Calcium Magnesium Zinc 333-133-5 MG TABS Take 3 tablets by mouth daily.       cyanocobalamin (VITAMIN B-12) 1000 MCG tablet Take 1,000 mcg by mouth daily.       famotidine (PEPCID) 20 MG tablet Take 20 mg by mouth daily.       losartan (COZAAR) 50 MG tablet Take 25 mg by mouth daily.       ondansetron (ZOFRAN ODT) 4 MG ODT tab Take 1 tablet (4 mg) by mouth every 6 hours as needed for nausea. 30 tablet 0     polyethylene glycol (MIRALAX) 17 GM/Dose powder Take 17 g by mouth daily. 510 g 0     potassium gluconate 2.5 MEQ tablet Take 1 tablet by mouth daily.       senna-docusate (SENOKOT-S/PERICOLACE) 8.6-50 MG tablet Take 1 tablet by mouth 2 times daily as needed for constipation. 60 tablet 0     simvastatin (ZOCOR) 20 MG tablet Take 20 mg by mouth at bedtime.       TRELEGY ELLIPTA 100-62.5-25 MCG/ACT oral inhaler Inhale 1 puff into the lungs daily.            Controlled medications:   not applicable/none     Past Medical History:   Past Medical History:   Diagnosis Date     COPD (chronic obstructive pulmonary disease) (H)      HLD (hyperlipidemia)      Hypertension      Physical Exam:   Vitals: /67   Pulse 77   Temp 97.7  F (36.5  C)   Resp 18   Ht 1.575 m (5' 2\")   Wt 70.8 kg (156 lb)   SpO2 95%   BMI 28.53 kg/m    BMI: Body mass index is 28.53 kg/m .  GENERAL APPEARANCE:  Alert, in no distress, oriented, cooperative  ENT:  Mouth and posterior oropharynx normal, moist mucous membranes  EYES:  EOM, conjunctivae, lids, pupils and irises normal  NECK:  No " adenopathy,masses or thyromegaly  RESP:  respiratory effort and palpation of chest normal, lungs clear to auscultation , no respiratory distress  CV:  Palpation and auscultation of heart done , regular rate and rhythm, no murmur, rub, or gallop, no edema to LE  GI/ABDOMEN:  normal bowel sounds, soft, nontender, no hepatosplenomegaly or other masses  M/S:   moves extremities spontaneously. Ambulating with walker   SKIN:  no rashes to exposed skin.   NEURO:   intact   PSYCH:  oriented X 3, normal insight, judgement and memory, affect and mood normal,        SNF labs: Recent labs in [facility] reviewed by me today.     DISCHARGE PLAN:  Follow up labs: As directed by PCP   Medical Follow Up:      Follow up with primary care provider in 5-7 days  [facility] scheduled appointments:  Appointments in Next Year      Dec 03, 2024 8:00 AM  Discharge Summary with Vikki Dixon CNP  [facility] ([facility] ) [phone]     Dec 23, 2024 11:10 AM  (Arrive by 10:55 AM)  New Patient with Bismark Lopez DO  [facility] [place] ([facility]) [phone]         \  Discharge Services: Home Care:  Occupational Therapy and Physical Therapy  Discharge Instructions Verbalized to Patient at Discharge:   None    TOTAL DISCHARGE TIME:   Greater than 30 minutes  Electronically signed by:  Vikki Dixon CNP     Documentation of Face to Face and Certification for Home Health Services    I certify that services are/were furnished while this patient was under the care of a physician and that a physician or an allowed non-physician practitioner (NPP), had a face-to-face encounter that meets the physician face-to-face encounter requirements. The encounter was in whole, or in part, related to the primary reason for home health. The patient is confined to his/her home and needs intermittent skilled nursing, physical therapy,  speech-language pathology, or the continued need for occupational therapy. A plan of care has been established by a physician and is periodically reviewed by a physician.  Date of Face-to-Face Encounter: 12/3/2024.    I certify that, based on my findings, the following services are medically necessary home health services: Occupational Therapy and Physical Therapy.    My clinical findings support the need for the above skilled services because: Requires assistance of another person or specialized equipment to access medical services because patient: Requires supervision of another for safe transfer...    Patient to re-establish plan of care with their PCP within 7-10 days after leaving the facility to reestablish care.  Medicare certified PECOS provider: Vikki Dixon CNP  Date: December 3, 2024              Sincerely,        Vikki Dixon CNP

## 2024-12-12 ENCOUNTER — LAB REQUISITION (OUTPATIENT)
Dept: LAB | Facility: CLINIC | Age: 74
End: 2024-12-12
Payer: COMMERCIAL

## 2024-12-12 DIAGNOSIS — K57.92 DIVERTICULITIS OF INTESTINE, PART UNSPECIFIED, WITHOUT PERFORATION OR ABSCESS WITHOUT BLEEDING: ICD-10-CM

## 2024-12-12 PROCEDURE — 80048 BASIC METABOLIC PNL TOTAL CA: CPT | Mod: ORL | Performed by: FAMILY MEDICINE

## 2024-12-13 LAB
ANION GAP SERPL CALCULATED.3IONS-SCNC: 11 MMOL/L (ref 7–15)
BUN SERPL-MCNC: 13.4 MG/DL (ref 8–23)
CALCIUM SERPL-MCNC: 10.2 MG/DL (ref 8.8–10.4)
CHLORIDE SERPL-SCNC: 102 MMOL/L (ref 98–107)
CREAT SERPL-MCNC: 0.95 MG/DL (ref 0.51–0.95)
EGFRCR SERPLBLD CKD-EPI 2021: 62 ML/MIN/1.73M2
GLUCOSE SERPL-MCNC: 125 MG/DL (ref 70–99)
HCO3 SERPL-SCNC: 25 MMOL/L (ref 22–29)
POTASSIUM SERPL-SCNC: 4 MMOL/L (ref 3.4–5.3)
SODIUM SERPL-SCNC: 138 MMOL/L (ref 135–145)

## 2025-04-22 ENCOUNTER — APPOINTMENT (OUTPATIENT)
Dept: CT IMAGING | Facility: CLINIC | Age: 75
DRG: 193 | End: 2025-04-22
Attending: EMERGENCY MEDICINE
Payer: COMMERCIAL

## 2025-04-22 ENCOUNTER — HOSPITAL ENCOUNTER (INPATIENT)
Facility: CLINIC | Age: 75
DRG: 193 | End: 2025-04-22
Attending: EMERGENCY MEDICINE | Admitting: HOSPITALIST
Payer: COMMERCIAL

## 2025-04-22 DIAGNOSIS — J15.9 BACTERIAL PNEUMONIA: ICD-10-CM

## 2025-04-22 DIAGNOSIS — J96.01 ACUTE HYPOXEMIC RESPIRATORY FAILURE (H): ICD-10-CM

## 2025-04-22 DIAGNOSIS — J44.1 COPD EXACERBATION (H): Primary | ICD-10-CM

## 2025-04-22 DIAGNOSIS — K57.32 DIVERTICULITIS OF COLON: ICD-10-CM

## 2025-04-22 DIAGNOSIS — J18.9 PNEUMONIA OF BOTH LOWER LOBES DUE TO INFECTIOUS ORGANISM: ICD-10-CM

## 2025-04-22 PROBLEM — E78.5 HYPERLIPIDEMIA: Status: ACTIVE | Noted: 2024-11-20

## 2025-04-22 PROBLEM — J44.9 CHRONIC OBSTRUCTIVE PULMONARY DISEASE (H): Status: ACTIVE | Noted: 2024-11-20

## 2025-04-22 PROBLEM — I10 ESSENTIAL HYPERTENSION: Status: ACTIVE | Noted: 2024-11-20

## 2025-04-22 PROBLEM — N18.30 STAGE 3 CHRONIC KIDNEY DISEASE (H): Status: ACTIVE | Noted: 2024-11-20

## 2025-04-22 LAB
ALBUMIN SERPL BCG-MCNC: 3.8 G/DL (ref 3.5–5.2)
ALP SERPL-CCNC: 89 U/L (ref 40–150)
ALT SERPL W P-5'-P-CCNC: 8 U/L (ref 0–50)
ANION GAP SERPL CALCULATED.3IONS-SCNC: 13 MMOL/L (ref 7–15)
AST SERPL W P-5'-P-CCNC: 18 U/L (ref 0–45)
ATRIAL RATE - MUSE: 95 BPM
BASOPHILS # BLD AUTO: 0.1 10E3/UL (ref 0–0.2)
BASOPHILS NFR BLD AUTO: 1 %
BILIRUB SERPL-MCNC: 0.3 MG/DL
BUN SERPL-MCNC: 17.2 MG/DL (ref 8–23)
CALCIUM SERPL-MCNC: 9.3 MG/DL (ref 8.8–10.4)
CHLORIDE SERPL-SCNC: 97 MMOL/L (ref 98–107)
CREAT SERPL-MCNC: 0.88 MG/DL (ref 0.51–0.95)
DIASTOLIC BLOOD PRESSURE - MUSE: 82 MMHG
EGFRCR SERPLBLD CKD-EPI 2021: 68 ML/MIN/1.73M2
EOSINOPHIL # BLD AUTO: 0.8 10E3/UL (ref 0–0.7)
EOSINOPHIL NFR BLD AUTO: 9 %
ERYTHROCYTE [DISTWIDTH] IN BLOOD BY AUTOMATED COUNT: 13.4 % (ref 10–15)
GLUCOSE SERPL-MCNC: 114 MG/DL (ref 70–99)
HCO3 SERPL-SCNC: 23 MMOL/L (ref 22–29)
HCT VFR BLD AUTO: 36 % (ref 35–47)
HGB BLD-MCNC: 12.2 G/DL (ref 11.7–15.7)
IMM GRANULOCYTES # BLD: 0.1 10E3/UL
IMM GRANULOCYTES NFR BLD: 1 %
INTERPRETATION ECG - MUSE: NORMAL
LIPASE SERPL-CCNC: 44 U/L (ref 13–60)
LYMPHOCYTES # BLD AUTO: 1.4 10E3/UL (ref 0.8–5.3)
LYMPHOCYTES NFR BLD AUTO: 16 %
MCH RBC QN AUTO: 28.9 PG (ref 26.5–33)
MCHC RBC AUTO-ENTMCNC: 33.9 G/DL (ref 31.5–36.5)
MCV RBC AUTO: 85 FL (ref 78–100)
MONOCYTES # BLD AUTO: 0.9 10E3/UL (ref 0–1.3)
MONOCYTES NFR BLD AUTO: 11 %
NEUTROPHILS # BLD AUTO: 5.2 10E3/UL (ref 1.6–8.3)
NEUTROPHILS NFR BLD AUTO: 62 %
NRBC # BLD AUTO: 0 10E3/UL
NRBC BLD AUTO-RTO: 0 /100
P AXIS - MUSE: 57 DEGREES
PLATELET # BLD AUTO: 310 10E3/UL (ref 150–450)
POTASSIUM SERPL-SCNC: 3.7 MMOL/L (ref 3.4–5.3)
PR INTERVAL - MUSE: 156 MS
PROT SERPL-MCNC: 6.9 G/DL (ref 6.4–8.3)
QRS DURATION - MUSE: 82 MS
QT - MUSE: 352 MS
QTC - MUSE: 442 MS
R AXIS - MUSE: 40 DEGREES
RBC # BLD AUTO: 4.22 10E6/UL (ref 3.8–5.2)
SODIUM SERPL-SCNC: 133 MMOL/L (ref 135–145)
SYSTOLIC BLOOD PRESSURE - MUSE: 180 MMHG
T AXIS - MUSE: 52 DEGREES
VENTRICULAR RATE- MUSE: 95 BPM
WBC # BLD AUTO: 8.3 10E3/UL (ref 4–11)

## 2025-04-22 PROCEDURE — 85025 COMPLETE CBC W/AUTO DIFF WBC: CPT | Performed by: EMERGENCY MEDICINE

## 2025-04-22 PROCEDURE — 250N000011 HC RX IP 250 OP 636: Performed by: EMERGENCY MEDICINE

## 2025-04-22 PROCEDURE — 99222 1ST HOSP IP/OBS MODERATE 55: CPT | Performed by: STUDENT IN AN ORGANIZED HEALTH CARE EDUCATION/TRAINING PROGRAM

## 2025-04-22 PROCEDURE — 96375 TX/PRO/DX INJ NEW DRUG ADDON: CPT

## 2025-04-22 PROCEDURE — 250N000013 HC RX MED GY IP 250 OP 250 PS 637: Performed by: STUDENT IN AN ORGANIZED HEALTH CARE EDUCATION/TRAINING PROGRAM

## 2025-04-22 PROCEDURE — 83690 ASSAY OF LIPASE: CPT | Performed by: EMERGENCY MEDICINE

## 2025-04-22 PROCEDURE — 250N000013 HC RX MED GY IP 250 OP 250 PS 637: Performed by: EMERGENCY MEDICINE

## 2025-04-22 PROCEDURE — 250N000009 HC RX 250: Performed by: STUDENT IN AN ORGANIZED HEALTH CARE EDUCATION/TRAINING PROGRAM

## 2025-04-22 PROCEDURE — 94640 AIRWAY INHALATION TREATMENT: CPT | Mod: 76

## 2025-04-22 PROCEDURE — 99223 1ST HOSP IP/OBS HIGH 75: CPT | Performed by: STUDENT IN AN ORGANIZED HEALTH CARE EDUCATION/TRAINING PROGRAM

## 2025-04-22 PROCEDURE — 999N000157 HC STATISTIC RCP TIME EA 10 MIN

## 2025-04-22 PROCEDURE — 82310 ASSAY OF CALCIUM: CPT | Performed by: EMERGENCY MEDICINE

## 2025-04-22 PROCEDURE — 120N000001 HC R&B MED SURG/OB

## 2025-04-22 PROCEDURE — 250N000012 HC RX MED GY IP 250 OP 636 PS 637: Performed by: STUDENT IN AN ORGANIZED HEALTH CARE EDUCATION/TRAINING PROGRAM

## 2025-04-22 PROCEDURE — 36415 COLL VENOUS BLD VENIPUNCTURE: CPT | Performed by: EMERGENCY MEDICINE

## 2025-04-22 PROCEDURE — 96374 THER/PROPH/DIAG INJ IV PUSH: CPT | Mod: 59

## 2025-04-22 PROCEDURE — 250N000011 HC RX IP 250 OP 636: Mod: JZ | Performed by: EMERGENCY MEDICINE

## 2025-04-22 PROCEDURE — 93005 ELECTROCARDIOGRAM TRACING: CPT | Performed by: EMERGENCY MEDICINE

## 2025-04-22 PROCEDURE — 71275 CT ANGIOGRAPHY CHEST: CPT

## 2025-04-22 PROCEDURE — 74177 CT ABD & PELVIS W/CONTRAST: CPT

## 2025-04-22 PROCEDURE — 250N000011 HC RX IP 250 OP 636: Mod: JZ | Performed by: STUDENT IN AN ORGANIZED HEALTH CARE EDUCATION/TRAINING PROGRAM

## 2025-04-22 PROCEDURE — 99291 CRITICAL CARE FIRST HOUR: CPT | Mod: 25

## 2025-04-22 RX ORDER — LEVOFLOXACIN 750 MG/1
750 TABLET, FILM COATED ORAL ONCE
Status: COMPLETED | OUTPATIENT
Start: 2025-04-22 | End: 2025-04-22

## 2025-04-22 RX ORDER — LEVOFLOXACIN 5 MG/ML
750 INJECTION, SOLUTION INTRAVENOUS EVERY 24 HOURS
Status: DISCONTINUED | OUTPATIENT
Start: 2025-04-23 | End: 2025-04-23

## 2025-04-22 RX ORDER — ALBUTEROL SULFATE 90 UG/1
1 INHALANT RESPIRATORY (INHALATION) EVERY 4 HOURS PRN
Status: DISCONTINUED | OUTPATIENT
Start: 2025-04-22 | End: 2025-04-25 | Stop reason: HOSPADM

## 2025-04-22 RX ORDER — AMOXICILLIN 250 MG
2 CAPSULE ORAL 2 TIMES DAILY PRN
Status: DISCONTINUED | OUTPATIENT
Start: 2025-04-22 | End: 2025-04-25 | Stop reason: HOSPADM

## 2025-04-22 RX ORDER — CIPROFLOXACIN 2 MG/ML
400 INJECTION, SOLUTION INTRAVENOUS EVERY 12 HOURS
Status: DISCONTINUED | OUTPATIENT
Start: 2025-04-22 | End: 2025-04-22

## 2025-04-22 RX ORDER — SIMVASTATIN 20 MG
20 TABLET ORAL AT BEDTIME
Status: DISCONTINUED | OUTPATIENT
Start: 2025-04-22 | End: 2025-04-25 | Stop reason: HOSPADM

## 2025-04-22 RX ORDER — METRONIDAZOLE 500 MG/100ML
500 INJECTION, SOLUTION INTRAVENOUS EVERY 8 HOURS
Status: DISCONTINUED | OUTPATIENT
Start: 2025-04-22 | End: 2025-04-23

## 2025-04-22 RX ORDER — ACETAMINOPHEN 325 MG/1
650 TABLET ORAL EVERY 4 HOURS PRN
Status: DISCONTINUED | OUTPATIENT
Start: 2025-04-22 | End: 2025-04-25 | Stop reason: HOSPADM

## 2025-04-22 RX ORDER — PROCHLORPERAZINE MALEATE 5 MG/1
5 TABLET ORAL EVERY 6 HOURS PRN
Status: DISCONTINUED | OUTPATIENT
Start: 2025-04-22 | End: 2025-04-25 | Stop reason: HOSPADM

## 2025-04-22 RX ORDER — NALOXONE HYDROCHLORIDE 0.4 MG/ML
0.4 INJECTION, SOLUTION INTRAMUSCULAR; INTRAVENOUS; SUBCUTANEOUS
Status: DISCONTINUED | OUTPATIENT
Start: 2025-04-22 | End: 2025-04-25 | Stop reason: HOSPADM

## 2025-04-22 RX ORDER — ACETAMINOPHEN 650 MG/1
650 SUPPOSITORY RECTAL EVERY 4 HOURS PRN
Status: DISCONTINUED | OUTPATIENT
Start: 2025-04-22 | End: 2025-04-25 | Stop reason: HOSPADM

## 2025-04-22 RX ORDER — CALCIUM CARBONATE 500 MG/1
1000 TABLET, CHEWABLE ORAL 4 TIMES DAILY PRN
Status: DISCONTINUED | OUTPATIENT
Start: 2025-04-22 | End: 2025-04-23

## 2025-04-22 RX ORDER — ENOXAPARIN SODIUM 100 MG/ML
40 INJECTION SUBCUTANEOUS EVERY 24 HOURS
Status: DISCONTINUED | OUTPATIENT
Start: 2025-04-22 | End: 2025-04-25 | Stop reason: HOSPADM

## 2025-04-22 RX ORDER — LIDOCAINE 40 MG/G
CREAM TOPICAL
Status: DISCONTINUED | OUTPATIENT
Start: 2025-04-22 | End: 2025-04-25 | Stop reason: HOSPADM

## 2025-04-22 RX ORDER — PREDNISONE 20 MG/1
40 TABLET ORAL DAILY
Status: DISCONTINUED | OUTPATIENT
Start: 2025-04-22 | End: 2025-04-25 | Stop reason: HOSPADM

## 2025-04-22 RX ORDER — HYDROMORPHONE HYDROCHLORIDE 1 MG/ML
0.5 INJECTION, SOLUTION INTRAMUSCULAR; INTRAVENOUS; SUBCUTANEOUS ONCE
Status: COMPLETED | OUTPATIENT
Start: 2025-04-22 | End: 2025-04-22

## 2025-04-22 RX ORDER — ONDANSETRON 4 MG/1
4 TABLET, ORALLY DISINTEGRATING ORAL EVERY 6 HOURS PRN
Status: DISCONTINUED | OUTPATIENT
Start: 2025-04-22 | End: 2025-04-25 | Stop reason: HOSPADM

## 2025-04-22 RX ORDER — NALOXONE HYDROCHLORIDE 0.4 MG/ML
0.2 INJECTION, SOLUTION INTRAMUSCULAR; INTRAVENOUS; SUBCUTANEOUS
Status: DISCONTINUED | OUTPATIENT
Start: 2025-04-22 | End: 2025-04-25 | Stop reason: HOSPADM

## 2025-04-22 RX ORDER — OXYCODONE HYDROCHLORIDE 5 MG/1
5 TABLET ORAL EVERY 4 HOURS PRN
Status: DISCONTINUED | OUTPATIENT
Start: 2025-04-22 | End: 2025-04-25 | Stop reason: HOSPADM

## 2025-04-22 RX ORDER — ONDANSETRON 2 MG/ML
4 INJECTION INTRAMUSCULAR; INTRAVENOUS EVERY 6 HOURS PRN
Status: DISCONTINUED | OUTPATIENT
Start: 2025-04-22 | End: 2025-04-25 | Stop reason: HOSPADM

## 2025-04-22 RX ORDER — MORPHINE SULFATE 4 MG/ML
4 INJECTION, SOLUTION INTRAMUSCULAR; INTRAVENOUS ONCE
Status: COMPLETED | OUTPATIENT
Start: 2025-04-22 | End: 2025-04-22

## 2025-04-22 RX ORDER — LOSARTAN POTASSIUM 25 MG/1
25 TABLET ORAL DAILY
Status: DISCONTINUED | OUTPATIENT
Start: 2025-04-23 | End: 2025-04-25 | Stop reason: HOSPADM

## 2025-04-22 RX ORDER — AMOXICILLIN 250 MG
1 CAPSULE ORAL 2 TIMES DAILY PRN
Status: DISCONTINUED | OUTPATIENT
Start: 2025-04-22 | End: 2025-04-25 | Stop reason: HOSPADM

## 2025-04-22 RX ORDER — FLUTICASONE FUROATE AND VILANTEROL 100; 25 UG/1; UG/1
1 POWDER RESPIRATORY (INHALATION) DAILY
Status: DISCONTINUED | OUTPATIENT
Start: 2025-04-22 | End: 2025-04-22 | Stop reason: ALTCHOICE

## 2025-04-22 RX ORDER — IPRATROPIUM BROMIDE AND ALBUTEROL SULFATE 2.5; .5 MG/3ML; MG/3ML
3 SOLUTION RESPIRATORY (INHALATION)
Status: DISCONTINUED | OUTPATIENT
Start: 2025-04-22 | End: 2025-04-25 | Stop reason: HOSPADM

## 2025-04-22 RX ORDER — IOPAMIDOL 755 MG/ML
90 INJECTION, SOLUTION INTRAVASCULAR ONCE
Status: COMPLETED | OUTPATIENT
Start: 2025-04-22 | End: 2025-04-22

## 2025-04-22 RX ADMIN — MORPHINE SULFATE 4 MG: 4 INJECTION, SOLUTION INTRAMUSCULAR; INTRAVENOUS at 05:40

## 2025-04-22 RX ADMIN — IPRATROPIUM BROMIDE AND ALBUTEROL SULFATE 3 ML: .5; 3 SOLUTION RESPIRATORY (INHALATION) at 12:51

## 2025-04-22 RX ADMIN — OXYCODONE HYDROCHLORIDE 2.5 MG: 5 TABLET ORAL at 10:32

## 2025-04-22 RX ADMIN — MORPHINE SULFATE 4 MG: 4 INJECTION, SOLUTION INTRAMUSCULAR; INTRAVENOUS at 05:13

## 2025-04-22 RX ADMIN — ENOXAPARIN SODIUM 40 MG: 40 INJECTION SUBCUTANEOUS at 09:37

## 2025-04-22 RX ADMIN — IPRATROPIUM BROMIDE AND ALBUTEROL SULFATE 3 ML: .5; 3 SOLUTION RESPIRATORY (INHALATION) at 19:41

## 2025-04-22 RX ADMIN — CALCIUM CARBONATE (ANTACID) CHEW TAB 500 MG 1000 MG: 500 CHEW TAB at 13:09

## 2025-04-22 RX ADMIN — SIMVASTATIN 20 MG: 20 TABLET, FILM COATED ORAL at 21:24

## 2025-04-22 RX ADMIN — OXYCODONE HYDROCHLORIDE 2.5 MG: 5 TABLET ORAL at 21:25

## 2025-04-22 RX ADMIN — METRONIDAZOLE 500 MG: 500 INJECTION, SOLUTION INTRAVENOUS at 16:30

## 2025-04-22 RX ADMIN — LEVOFLOXACIN 750 MG: 750 TABLET, FILM COATED ORAL at 07:02

## 2025-04-22 RX ADMIN — PREDNISONE 40 MG: 20 TABLET ORAL at 13:09

## 2025-04-22 RX ADMIN — IPRATROPIUM BROMIDE AND ALBUTEROL SULFATE 3 ML: .5; 3 SOLUTION RESPIRATORY (INHALATION) at 15:40

## 2025-04-22 RX ADMIN — HYDROMORPHONE HYDROCHLORIDE 0.5 MG: 1 INJECTION, SOLUTION INTRAMUSCULAR; INTRAVENOUS; SUBCUTANEOUS at 06:16

## 2025-04-22 RX ADMIN — IOPAMIDOL 90 ML: 755 INJECTION, SOLUTION INTRAVENOUS at 05:53

## 2025-04-22 ASSESSMENT — ACTIVITIES OF DAILY LIVING (ADL)
ADLS_ACUITY_SCORE: 54
ADLS_ACUITY_SCORE: 54
ADLS_ACUITY_SCORE: 36
ADLS_ACUITY_SCORE: 54
ADLS_ACUITY_SCORE: 54
ADLS_ACUITY_SCORE: 36
ADLS_ACUITY_SCORE: 54

## 2025-04-22 ASSESSMENT — COLUMBIA-SUICIDE SEVERITY RATING SCALE - C-SSRS
1. IN THE PAST MONTH, HAVE YOU WISHED YOU WERE DEAD OR WISHED YOU COULD GO TO SLEEP AND NOT WAKE UP?: NO
6. HAVE YOU EVER DONE ANYTHING, STARTED TO DO ANYTHING, OR PREPARED TO DO ANYTHING TO END YOUR LIFE?: NO
2. HAVE YOU ACTUALLY HAD ANY THOUGHTS OF KILLING YOURSELF IN THE PAST MONTH?: NO

## 2025-04-22 NOTE — ED NOTES
Pt now endorsing 6/10 pain on the RUQ of her abdomen, just below her ribcage.     Morphine administered.

## 2025-04-22 NOTE — ED PROVIDER NOTES
EMERGENCY DEPARTMENT ENCOUNTER      NAME: Alexandra Mckeon  AGE: 74 year old female  YOB: 1950  MRN: [id number]  EVALUATION DATE & TIME: 4/22/2025  4:47 AM    PCP: Clinic, [facility]    ED PROVIDER: Sasha Buenrostro MD    Chief Complaint   Patient presents with    Abdominal Pain         FINAL IMPRESSION:  1. Acute hypoxemic respiratory failure (H)    2. Pneumonia of both lower lobes due to infectious organism          ED COURSE & MEDICAL DECISION MAKING:    Pertinent Labs & Imaging studies reviewed. (See chart for details)  74 year old female with history of HTN, HLD, COPD and previous diverticulitis with perforation this past November who presents to the Emergency Department for evaluation of lower abdominal pain and some loose stools over the course of the last week.  However at developed acute onset of right upper quadrant abdominal pain that seems to somewhat take her breath away this evening.  On examination patient actually denies any current pain initially, borderline tachycardic in the 90s to 100s but O2 sats are low at 89%.  Looking back historically over the last 5 months her O2 sats have been anywhere between 92 to 95%.  Differential diverticulitis, perforation, abscess, diaphragmatic irritation from peritonitis, pulmonary embolism, and less likely primary hepatobiliary pathology.    Patient placed on monitor, IV established and blood obtained.  Twelve-lead EKG shows sinus rhythm.  Initially denied any pain, but shortly thereafter complaining of recurrent right upper quadrant abdominal pain.  Given 4 mg morphine.  CBC, CMP, lipase unremarkable.  Complaining of ongoing pain and did desaturate some with the above morphine, placed on oxygen.  Given repeat dose of morphine.  CT PE study, CT abdomen pelvis shows evidence of bibasilar pneumonia.  Was covered with Levaquin given her cephalosporin and penicillin allergy and with hypoxia will be admitted to medicine for further management.        ED Course as of 04/22/25 0709   Tue Apr 22, 2025   0449 I met with the patient to obtain patient history and performed a physical exam. Discussed plan for ED work up including potential diagnostic studies and interventions.    0455 SpO2(!): 89 %  92-95% over last 5mo   0509 Now c/o abd pain requests pain meds   0538 Notes ongoing pain   0609 CT Chest Pulmonary Embolism w Contrast  CT independently interpreted by myself.  No visualized central PE.  Mild infiltrates bilateral bases.  Hiatal hernia.   0613 Still c/o severe ruq abd pain   0658 Spoke w SOC, only beds are [facility], pt declines   0708 Admitted to Dr. Bee       Medical Decision Making  I obtained history from Family Member/Significant Other  Admit.    MIPS (CTPE, Dental pain, [place], Sinusitis, Asthma/COPD, Head Trauma): CT Pulmonary Angiogram:Patient is moderate to high risk for PE.    SEPSIS: None          At the conclusion of the encounter I discussed the results of all of the tests and the disposition. The questions were answered. The patient or family acknowledged understanding and was agreeable with the care plan.    CRITICAL CARE:  Critical Care  Performed by: Sasha Buenrostro MD  Authorized by: Sasha Buenrostro MD     Total critical care time: 37 minutes  Criticalcare time was exclusive of separately billable procedures and treating other patients.  Critical care was necessary to treat or prevent imminent or life-threatening deterioration of the following conditions: Acute hypoxic respiratory failure  Critical care was time spent personally by me on the following activities: development of treatment plan with patient or surrogate, discussions with consultants, examination of patient, evaluation of patient's response totreatment, obtaining history from patient or surrogate, ordering and performing treatments and interventions, ordering and review of laboratory studies, ordering and review of radiographic studies and re-evaluation ofpatient's  condition, this excludes any separately billable procedures.      MEDICATIONS GIVEN IN THE EMERGENCY:  Medications   morphine (PF) injection 4 mg (4 mg Intravenous $Given 4/22/25 0513)   iopamidol (ISOVUE-370) solution 90 mL (90 mLs Intravenous $Given 4/22/25 0553)   morphine (PF) injection 4 mg (4 mg Intravenous $Given 4/22/25 0540)   HYDROmorphone (PF) (DILAUDID) injection 0.5 mg (0.5 mg Intravenous $Given 4/22/25 0616)   levofloxacin (LEVAQUIN) tablet 750 mg (750 mg Oral $Given 4/22/25 0702)       NEW PRESCRIPTIONS STARTED AT TODAY'S ER VISIT  New Prescriptions    No medications on file          =================================================================    HPI    Patient information was obtained from: patient and family member     Use of Intrepreter: N/A        Alexandra Mckeon is a 74 year old female with pertinent medical history of diverticulitis of colon with perforation, COPD, hypertension, hyperlipidemia, chronic kidney disease, who presents for abdominal pain.    Patient presents to the ED for concerns of RUQ and lower abdominal pain. The lower abdominal pain has been ongoing for about 1 week. The RUQ pain onset more recently. She states that she went to the clinic on 4/18/25 and they determined that she had diverticulitis. She had a fever of 100 on 4/15/25. She endorses shortness of breath but she states that she has COPD. Her shortness of breath increases when laying down. She does not have a history of bleeding disorders or blood clots. Her pain is at a 0/10.     Patient denies any nausea and vomiting. Patient states no other complaints or concerns at this time.     Per Chart Review:   4/22/25, [facility]: Patient presents for left abdominal pain for one week, low-grade fever (100.2F on Tuesday), lower back pain in the kidney area, and loose stools. Physical examination revealed tenderness in the left lower abdomen and lower back. Symptoms progressed from initial difficulty with bowel  movements to loose stools. No blood in stool, nausea, or vomiting reported. Initiate bowel rest with clear liquid diet for 2-3 days- Gradually reintroduce full liquids followed by high-fiber diet as symptoms improve- Prescribe antibiotics: - Metronidazole 500 mg PO BID for 5 days (patient has 10 pills left from previous prescription) - Ciprofloxacin 500 mg PO BID for 5 days- Instruct patient to go to ER if symptoms worsen.     11/17/24-11/20/24, [facility]: Patient was admitted on 11/17/2024 for sepsis secondary to acute perforated sigmoid diverticulitis. Patient presented with generalized abdominal pain, fever and weakness found to have acute perforated sigmoid diverticulitis on CT abdomen, no abscess noted.  She was started on meropenem in the ED which was then transitioned to IV metronidazole and p.o. ciprofloxacin.  Surgery was consulted, no surgical intervention was required as there was no abscess present.  Her diet was advanced slowly first to clears and then full diet as tolerated.  She stated that she was not experiencing any increased abdominal pain with progression of her diet, no nausea.  She did have multiple loose stools which were improving upon discharge.  Her fever resolved with antibiotic treatment and she has been vitally stable.  PT and OT were consulted in the setting of increased weakness and recommended TCU for further rehabilitation.       PAST MEDICAL HISTORY:  Past Medical History:   Diagnosis Date    COPD (chronic obstructive pulmonary disease) (H)     HLD (hyperlipidemia)     Hypertension        PAST SURGICAL HISTORY:  No past surgical history on file.    CURRENT MEDICATIONS:    Prior to Admission Medications   Prescriptions Last Dose Informant Patient Reported? Taking?   Calcium Magnesium Zinc 333-133-5 MG TABS   Yes No   Sig: Take 3 tablets by mouth daily.   TRELEGY ELLIPTA 100-62.5-25 MCG/ACT oral inhaler   Yes No   Sig: Inhale 1 puff into the lungs daily.    acetaminophen (TYLENOL) 325 MG tablet   No No   Sig: Take 1 tablet (325 mg) by mouth every 4 hours as needed for mild pain or fever.   albuterol (PROAIR HFA/PROVENTIL HFA/VENTOLIN HFA) 108 (90 Base) MCG/ACT inhaler   Yes No   Sig: Inhale 1 puff into the lungs every 4 hours as needed.   calcium carbonate (TUMS) 500 MG chewable tablet   Yes No   Sig: Take 1 chew tab by mouth 4 times daily as needed for heartburn.   cyanocobalamin (VITAMIN B-12) 1000 MCG tablet   Yes No   Sig: Take 1,000 mcg by mouth daily.   famotidine (PEPCID) 20 MG tablet   Yes No   Sig: Take 20 mg by mouth daily.   losartan (COZAAR) 50 MG tablet   Yes No   Sig: Take 25 mg by mouth daily.   ondansetron (ZOFRAN ODT) 4 MG ODT tab   No No   Sig: Take 1 tablet (4 mg) by mouth every 6 hours as needed for nausea.   polyethylene glycol (MIRALAX) 17 GM/Dose powder   No No   Sig: Take 17 g by mouth daily.   potassium gluconate 2.5 MEQ tablet   Yes No   Sig: Take 1 tablet by mouth daily.   senna-docusate (SENOKOT-S/PERICOLACE) 8.6-50 MG tablet   No No   Sig: Take 1 tablet by mouth 2 times daily as needed for constipation.   simvastatin (ZOCOR) 20 MG tablet   Yes No   Sig: Take 20 mg by mouth at bedtime.      Facility-Administered Medications: None       ALLERGIES:  Allergies   Allergen Reactions    Aspirin Hives    Cephalexin Hives    Ibuprofen Hives    Penicillin V Hives    Penicillins Hives    Potassium Hives       FAMILY HISTORY:  No family history on file.    SOCIAL HISTORY:  Social History     Tobacco Use    Smoking status: Former     Current packs/day: 0.00     Average packs/day: 1 pack/day for 49.0 years (49.0 ttl pk-yrs)     Types: Cigarettes     Start date:      Quit date: 2019     Years since quittin.3        VITALS:  Patient Vitals for the past 24 hrs:   BP Temp Temp src Pulse Resp SpO2 Height Weight   25 0701 (!) 127/93 -- -- 97 (!) 37 92 % -- --   25 0630 126/69 -- -- 95 25 95 % -- --   25 0607 122/85 -- -- 110 (!) 40  "(!) 90 % -- --   04/22/25 0530 (!) 141/74 -- -- 104 (!) 42 94 % -- --   04/22/25 0502 (!) 131/93 -- -- 93 (!) 36 (!) 91 % -- --   04/22/25 0500 (!) 131/93 -- -- 98 (!) 40 (!) 91 % -- --   04/22/25 0455 -- -- -- -- -- -- -- 68 kg (150 lb)   04/22/25 0451 (!) 180/82 98.8  F (37.1  C) Oral 105 26 (!) 89 % 1.575 m (5' 2\") --       PHYSICAL EXAM    General Appearance: Well-appearing, well-nourished, no acute distress   Head:  Normocephalic  Cardio: Borderline tachycardia, no murmur/gallop/rub  Pulm:  Tachypnea with borderline hypoxia.  Breath sounds are clear.  No respiratory distress  Back:   No CVA tenderness, normal ROM  Abdomen:  Soft, non distended,no rebound or guarding. Suprapubic and LLQ pain.   Extremities: No peripheral edema  Skin:  Skin warm, dry, no rashes  Neuro:  Alert and oriented ×3     RADIOLOGY/LABS:  Reviewed all pertinent imaging. Please see official radiology report. All pertinent labs reviewed and interpreted.    Results for orders placed or performed during the hospital encounter of 04/22/25   CT Chest Pulmonary Embolism w Contrast    Impression    IMPRESSION:  1.  Negative for pulmonary embolism.    2.  Emphysematous changes in the lungs.    3.  Prominent consolidation/infiltrate in both lower lobes left greater than right suspicious for bilateral pneumonia.    4.  Generalized bronchial wall thickening suggests bronchiolitis.    5.  Minimal bilateral pleural effusions.    6.  Mild hilar and mediastinal adenopathy favored to be reactive in nature. Recommend follow-up chest CT in 2-3 months following appropriate therapy to monitor for resolution of these findings.    7.  Large hiatal hernia.                 CT Abdomen Pelvis w Contrast    Impression    IMPRESSION:   1.  Colon is partially fluid-filled suggestive of a nonspecific diarrheal state.    2.  Sigmoid diverticulosis. There is some very minimal stranding seen in the left lower quadrant adjacent to the sigmoid colon. Cannot completely " exclude a mild or low-grade diverticulitis and clinical correlation needed. No free air or fluid   collections.    3.  3 mm nonobstructing stone left kidney.    4.  Small left obturator hernia containing a small portion of the bladder.    5.  Large hiatal hernia.    6.  Hysterectomy.    7.  See separate chest CT report for those details.                  Comprehensive metabolic panel   Result Value Ref Range    Sodium 133 (L) 135 - 145 mmol/L    Potassium 3.7 3.4 - 5.3 mmol/L    Carbon Dioxide (CO2) 23 22 - 29 mmol/L    Anion Gap 13 7 - 15 mmol/L    Urea Nitrogen 17.2 8.0 - 23.0 mg/dL    Creatinine 0.88 0.51 - 0.95 mg/dL    GFR Estimate 68 >60 mL/min/1.73m2    Calcium 9.3 8.8 - 10.4 mg/dL    Chloride 97 (L) 98 - 107 mmol/L    Glucose 114 (H) 70 - 99 mg/dL    Alkaline Phosphatase 89 40 - 150 U/L    AST 18 0 - 45 U/L    ALT 8 0 - 50 U/L    Protein Total 6.9 6.4 - 8.3 g/dL    Albumin 3.8 3.5 - 5.2 g/dL    Bilirubin Total 0.3 <=1.2 mg/dL   Result Value Ref Range    Lipase 44 13 - 60 U/L   CBC with platelets and differential   Result Value Ref Range    WBC Count 8.3 4.0 - 11.0 10e3/uL    RBC Count 4.22 3.80 - 5.20 10e6/uL    Hemoglobin 12.2 11.7 - 15.7 g/dL    Hematocrit 36.0 35.0 - 47.0 %    MCV 85 78 - 100 fL    MCH 28.9 26.5 - 33.0 pg    MCHC 33.9 31.5 - 36.5 g/dL    RDW 13.4 10.0 - 15.0 %    Platelet Count 310 150 - 450 10e3/uL    % Neutrophils 62 %    % Lymphocytes 16 %    % Monocytes 11 %    % Eosinophils 9 %    % Basophils 1 %    % Immature Granulocytes 1 %    NRBCs per 100 WBC 0 <1 /100    Absolute Neutrophils 5.2 1.6 - 8.3 10e3/uL    Absolute Lymphocytes 1.4 0.8 - 5.3 10e3/uL    Absolute Monocytes 0.9 0.0 - 1.3 10e3/uL    Absolute Eosinophils 0.8 (H) 0.0 - 0.7 10e3/uL    Absolute Basophils 0.1 0.0 - 0.2 10e3/uL    Absolute Immature Granulocytes 0.1 <=0.4 10e3/uL    Absolute NRBCs 0.0 10e3/uL   ECG 12-lead with MUSE (LHE)   Result Value Ref Range    Systolic Blood Pressure 180 mmHg    Diastolic Blood Pressure 82  mmHg    Ventricular Rate 95 BPM    Atrial Rate 95 BPM    WI Interval 156 ms    QRS Duration 82 ms     ms    QTc 442 ms    P Axis 57 degrees    R AXIS 40 degrees    T Axis 52 degrees    Interpretation ECG       Sinus rhythm  Normal ECG  When compared with ECG of 18-Nov-2024 01:42,  Premature ventricular complexes are no longer Present  Confirmed by SEE ED PROVIDER NOTE FOR, ECG INTERPRETATION ([id number]),  RASHEED COE ([id number]) on 4/22/2025 5:38:47 AM         EKG:  Performed at: April 22, 2025 05:03:10, [facility] ED.     Impression: Sinus rhythm. Normal ECG. When compared with ECG of 18-Nov-2024 01:42, Premature ventricular complexes are no longer present.     Rate: 95 BPM   Rhythm: Sinus   Axis: 57 40 52   WI Interval: 156 ms   QRS Interval: 82 ms   QTc Interval: 442 ms   Comparison: When compared with ECG of 18-Nov-2024 01:42, Premature ventricular complexes are no longer present.   I have independently reviewed and interpreted the EKG(s) documented above.      The creation of this record is based on the scribe s observations of the work being performed by Sasha Buenrostro MD and the provider s statements to them. It was created on her behalf by Ulisses Pederson, a trained medical scribe. This document has been checked and approved by the attending provider.    Sasha Buenrostro MD  Emergency Medicine  [facility] EMERGENCY ROOM  [address] [address]  [phone]  Dept: [phone]     Sasha Buenrostro MD  04/22/25 0709

## 2025-04-22 NOTE — H&P
[facility]    History and Physical - Hospitalist Service       Date of Admission:  4/22/2025    Assessment & Plan      Alexandra Mckeon is a 74 year old female admitted on 4/22/2025. She past medical history significant for COPD, hypertension, hyperlipidemia, and history of diverticulitis with perforation presented to [facility] with abdominal pain worsening over the last week, of note reports just finished abx for diverticulitis, she was subsequently found to have pneumonia but imaging showing possible low grade diverticulitis in the setting of recent treatment, GI consulted.    Acute hypoxic respiratory failure  COPD exacerbation  Pneumonia-After IV narcotics in ED did get a little hypoxic, will avoid and try to wean down oxygen as tolerated  -S/P Levaquin in ED  -Will reach out to ID marybel vs. Full consult for abx selection to potentially treat both diverticulitis and pneumonia given her allergies to penicillins and cephalosporins  -Steroids  -DuoNebs  - PTA inhaled medications  - Levaquin    Abdominal pain  Diverticulosis  History of diverticulitis-her generalized abdominal pain has improved on previous Cipro antibiotic per patient she just finished yesterday, new abdominal pain sounds more pleuritic in nature this could be related to her pneumonia however imaging cannot rule out low-grade diverticulitis given this GI was consulted appreciate recommendations.  -GI consulted, appreciate recs  -Oral pain control if possible  - Metronidazole plus Levaquin per above    HTN  -PTA losartan with holding parameters    HLD  -PTA Statin          Diet: Combination Diet Regular Diet Adult    DVT Prophylaxis: Enoxaparin (Lovenox) SQ  Ugalde Catheter: Not present  Lines: None     Cardiac Monitoring: None  Code Status: Full Code      Clinically Significant Risk Factors Present on Admission         # Hyponatremia: Lowest Na = 133 mmol/L in last 2 days, will monitor as appropriate  # Hypochloremia:  "Lowest Cl = 97 mmol/L in last 2 days, will monitor as appropriate          # Hypertension: Noted on problem list           # Overweight: Estimated body mass index is 27.44 kg/m  as calculated from the following:    Height as of this encounter: 1.575 m (5' 2\").    Weight as of this encounter: 68 kg (150 lb).       # Financial/Environmental Concerns:           Disposition Plan     Medically Ready for Discharge: Anticipated in 2-4 Days           Dangelo Bee MD  Hospitalist Service  [facility]  Securely message with [employer] (more info)  Text page via [employer] Paging/Directory     ______________________________________________________________________    Chief Complaint   Abd pain    History is obtained from the patient    History of Present Illness   Alexandra Mckeon is a 74 year old female who per ED    \"with pertinent medical history of diverticulitis of colon with perforation, COPD, hypertension, hyperlipidemia, chronic kidney disease, who presents for abdominal pain.     Patient presents to the ED for concerns of RUQ and lower abdominal pain. The lower abdominal pain has been ongoing for about 1 week. The RUQ pain onset more recently. She states that she went to the clinic on 4/18/25 and they determined that she had diverticulitis. She had a fever of 100 on 4/15/25. She endorses shortness of breath but she states that she has COPD. Her shortness of breath increases when laying down. She does not have a history of bleeding disorders or blood clots. Her pain is at a 0/10.      Patient denies any nausea and vomiting. Patient states no other complaints or concerns at this time.      Per Chart Review:   4/22/25, [facility]: Patient presents for left abdominal pain for one week, low-grade fever (100.2F on Tuesday), lower back pain in the kidney area, and loose stools. Physical examination revealed tenderness in the left lower abdomen and lower back. Symptoms progressed from initial difficulty " "with bowel movements to loose stools. No blood in stool, nausea, or vomiting reported. Initiate bowel rest with clear liquid diet for 2-3 days- Gradually reintroduce full liquids followed by high-fiber diet as symptoms improve- Prescribe antibiotics: - Metronidazole 500 mg PO BID for 5 days (patient has 10 pills left from previous prescription) - Ciprofloxacin 500 mg PO BID for 5 days- Instruct patient to go to ER if symptoms worsen.      11/17/24-11/20/24, [facility]: Patient was admitted on 11/17/2024 for sepsis secondary to acute perforated sigmoid diverticulitis. Patient presented with generalized abdominal pain, fever and weakness found to have acute perforated sigmoid diverticulitis on CT abdomen, no abscess noted.  She was started on meropenem in the ED which was then transitioned to IV metronidazole and p.o. ciprofloxacin.  Surgery was consulted, no surgical intervention was required as there was no abscess present.  Her diet was advanced slowly first to clears and then full diet as tolerated.  She stated that she was not experiencing any increased abdominal pain with progression of her diet, no nausea.  She did have multiple loose stools which were improving upon discharge.  Her fever resolved with antibiotic treatment and she has been vitally stable.  PT and OT were consulted in the setting of increased weakness and recommended TCU for further rehabilitation. \"  \"    Evaluated patient at bedside, confirmed above history, family was also present.  Abdominal pain had been improving on antibiotics developed pain that is worse with coughing right side under the rib cage more pleuritic in nature not worsened by p.o. intake.  Confirmed CODE STATUS is full code with patient during her bedside.  Past Medical History    Past Medical History:   Diagnosis Date    COPD (chronic obstructive pulmonary disease) (H)     HLD (hyperlipidemia)     Hypertension        Past Surgical History   No past " surgical history on file.    Prior to Admission Medications   Prior to Admission Medications   Prescriptions Last Dose Informant Patient Reported? Taking?   Calcium Magnesium Zinc 333-133-5 MG TABS 4/21/2025 Morning  Yes Yes   Sig: Take 3 tablets by mouth daily.   TRELEGY ELLIPTA 100-62.5-25 MCG/ACT oral inhaler 4/21/2025 Morning  Yes Yes   Sig: Inhale 1 puff into the lungs daily.   acetaminophen (TYLENOL) 325 MG tablet 4/22/2025 Morning  No Yes   Sig: Take 1 tablet (325 mg) by mouth every 4 hours as needed for mild pain or fever.   albuterol (PROAIR HFA/PROVENTIL HFA/VENTOLIN HFA) 108 (90 Base) MCG/ACT inhaler 4/21/2025 Morning  Yes Yes   Sig: Inhale 1 puff into the lungs every 4 hours as needed.   calcium carbonate (TUMS) 500 MG chewable tablet 4/21/2025 Morning  Yes Yes   Sig: Take 1 chew tab by mouth 4 times daily as needed for heartburn.   cyanocobalamin (VITAMIN B-12) 1000 MCG tablet 4/21/2025 Morning  Yes Yes   Sig: Take 1,000 mcg by mouth daily.   famotidine (PEPCID) 20 MG tablet 4/21/2025 Morning  Yes Yes   Sig: Take 20 mg by mouth daily.   losartan (COZAAR) 50 MG tablet 4/21/2025 Morning  Yes Yes   Sig: Take 25 mg by mouth daily.   polyethylene glycol (MIRALAX) 17 GM/Dose powder More than a month  No Yes   Sig: Take 17 g by mouth daily.   potassium gluconate 2.5 MEQ tablet 4/21/2025 Morning  Yes Yes   Sig: Take 1 tablet by mouth daily.   senna-docusate (SENOKOT-S/PERICOLACE) 8.6-50 MG tablet More than a month  No Yes   Sig: Take 1 tablet by mouth 2 times daily as needed for constipation.   simvastatin (ZOCOR) 20 MG tablet 4/20/2025 Bedtime  Yes No   Sig: Take 20 mg by mouth at bedtime.      Facility-Administered Medications: None           Physical Exam   Vital Signs: Temp: 98  F (36.7  C) Temp src: Oral BP: 138/75 Pulse: 81   Resp: 20 SpO2: 95 % O2 Device: Nasal cannula Oxygen Delivery: 2 LPM  Weight: 150 lbs 0 oz    Gen: Appears well, NAD, on RA  Card:Warm well perfused, pulses assumed patient  talking  Pulm: Normal I/E effort on RA  Abd:Non distended  Skin:No obvious rashes or lesions on exposed areas of skin  Neuro AxOx4, S/S grossly intact, no obvious FND,   Psych:Pleasant, answering questions appropriately, insight good, judgement good, does not appear to be responding to I/E stimuli     Medical Decision Making       75 MINUTES SPENT BY ME on the date of service doing chart review, history, exam, documentation & further activities per the note.      Data   ------------------------- PAST 24 HR DATA REVIEWED -----------------------------------------------    I have personally reviewed the following data over the past 24 hrs:    8.3  \   12.2   / 310     133 (L) 97 (L) 17.2 /  114 (H)   3.7 23 0.88 \     ALT: 8 AST: 18 AP: 89 TBILI: 0.3   ALB: 3.8 TOT PROTEIN: 6.9 LIPASE: 44       Imaging results reviewed over the past 24 hrs:   Recent Results (from the past 24 hours)   CT Chest Pulmonary Embolism w Contrast    Narrative    EXAM: CT CHEST PULMONARY EMBOLISM W CONTRAST  LOCATION: [facility]  DATE: 4/22/2025    INDICATION: R pleuritic chest pain  COMPARISON: None.  TECHNIQUE: CT chest pulmonary angiogram during arterial phase injection of IV contrast. Multiplanar reformats and MIP reconstructions were performed. Dose reduction techniques were used.   CONTRAST: 90 cc Isovue-370    FINDINGS:  ANGIOGRAM CHEST: Pulmonary arteries are normal caliber and negative for pulmonary emboli. Thoracic aorta is negative for dissection. No CT evidence of right heart strain.    LUNGS AND PLEURA: Moderate emphysematous changes in the lungs. Subpleural consolidation/infiltrate in both lower lobes, left greater than right. Findings suggest bilateral pneumonia. Generalized bronchial wall thickening in the lower lungs suggests   bronchiolitis. Minimal bilateral pleural effusions.    MEDIASTINUM/AXILLAE: Mild hilar and mediastinal adenopathy favored to be reactive in nature. Normal heart size. No  pericardial effusion. Normal caliber thoracic aorta. Large hiatal hernia.    CORONARY ARTERY CALCIFICATION: Moderate to severe.    UPPER ABDOMEN: See separate abdomen and pelvis CT report for those details.    MUSCULOSKELETAL: Mild degenerative changes in the spine.      Impression    IMPRESSION:  1.  Negative for pulmonary embolism.    2.  Emphysematous changes in the lungs.    3.  Prominent consolidation/infiltrate in both lower lobes left greater than right suspicious for bilateral pneumonia.    4.  Generalized bronchial wall thickening suggests bronchiolitis.    5.  Minimal bilateral pleural effusions.    6.  Mild hilar and mediastinal adenopathy favored to be reactive in nature. Recommend follow-up chest CT in 2-3 months following appropriate therapy to monitor for resolution of these findings.    7.  Large hiatal hernia.                 CT Abdomen Pelvis w Contrast    Narrative    EXAM: CT ABDOMEN PELVIS W CONTRAST  LOCATION: [facility]  DATE: 4/22/2025    INDICATION: abd pain  COMPARISON: 11/18/2024  TECHNIQUE: CT scan of the abdomen and pelvis was performed following injection of IV contrast. Multiplanar reformats were obtained. Dose reduction techniques were used.  CONTRAST: 90 cc Isovue-370    FINDINGS:   LOWER CHEST: See separate chest CT report for those details.    HEPATOBILIARY: Normal.    PANCREAS: Normal.    SPLEEN: Normal.    ADRENAL GLANDS: Normal.    KIDNEYS/BLADDER: Small benign renal cysts with no follow-up needed. 3 mm nonobstructing stone left kidney. No hydronephrosis. No bladder wall thickening. Small obturator hernia in the left containing a small portion of the bladder.    BOWEL: Large hiatal hernia. Small bowel is normal in caliber. Negative for appendicitis. Colon is partially fluid-filled suggestive of a nonspecific diarrheal state. Sigmoid diverticulosis. There is some very minimal stranding and edema in the left lower   quadrant adjacent to the sigmoid colon  with a mild or low-grade diverticulitis not excluded.    LYMPH NODES: Normal.    VASCULATURE: Aortic calcification. Mild ectasia of the abdominal aorta measuring 2.7 cm, unchanged.    PELVIC ORGANS: Hysterectomy.    MUSCULOSKELETAL: Mild/moderate degenerative changes in the spine.      Impression    IMPRESSION:   1.  Colon is partially fluid-filled suggestive of a nonspecific diarrheal state.    2.  Sigmoid diverticulosis. There is some very minimal stranding seen in the left lower quadrant adjacent to the sigmoid colon. Cannot completely exclude a mild or low-grade diverticulitis and clinical correlation needed. No free air or fluid   collections.    3.  3 mm nonobstructing stone left kidney.    4.  Small left obturator hernia containing a small portion of the bladder.    5.  Large hiatal hernia.    6.  Hysterectomy.    7.  See separate chest CT report for those details.

## 2025-04-22 NOTE — PROGRESS NOTES
RESPIRATORY CARE NOTE     Patient Name: Alexandra Mckeon  Today's Date: 4/22/2025       Pt continues to receive duoneb. BS are clear. Pt is on 4 lpm of oxygen via NC, SpO2 is 94%. Post treatment there is increased aeration. Pt also perceives improvement.  RT encouraged deep breathing and coughing techniques .  RT will continue to monitor and assess.     Cherelle Cruz, RT

## 2025-04-22 NOTE — ED NOTES
Pt continues to be in pain, groaning in pain and unable to get into a comfortable spot. Additional pain medication administered.     Still on 2L of O2. Daughter at bedside.     Awaiting CT results.

## 2025-04-22 NOTE — ED TRIAGE NOTES
Patient presents to the ED, brought in by daughter, she is complaining of abdominal pain for the past week.  She was seen by her doctor and treated for diverticulitis but the pain is still there.  She is also more short of breath than baseline.  History of COPD.      Triage Assessment (Adult)       Row Name 04/22/25 0452          Triage Assessment    Airway WDL WDL        Respiratory WDL    Respiratory WDL X;cough;rhythm/pattern     Rhythm/Pattern, Respiratory dyspnea upon exertion;shortness of breath     Cough Frequency frequent     Cough Type congested        Skin Circulation/Temperature WDL    Skin Circulation/Temperature WDL WDL        Cardiac WDL    Cardiac WDL X;rhythm     Pulse Rate & Regularity tachycardic        Peripheral/Neurovascular WDL    Peripheral Neurovascular WDL WDL        Cognitive/Neuro/Behavioral WDL    Cognitive/Neuro/Behavioral WDL WDL

## 2025-04-22 NOTE — PROGRESS NOTES
"[facility] ED Handoff Report    ED Chief Complaint: abd pain, found to have bilat pneumonia     ED Diagnosis:  (J96.01) Acute hypoxemic respiratory failure (H)  Comment: on 2L  Plan: wean o2 as able    (J18.9) Pneumonia of both lower lobes due to infectious organism  Comment: on abx         PMH:    Past Medical History:   Diagnosis Date    COPD (chronic obstructive pulmonary disease) (H)     HLD (hyperlipidemia)     Hypertension         Code Status:  Full Code     Falls Risk: Yes Band: Applied    Current Living Situation/Residence: lives alone at home    Elimination Status: Continent: Yes, pure wick in place d/t severe pain while moving and weakness      Activity Level: 2 assist    Patient's Preferred Language:  English     Needed: No    Vital Signs:  /75 (BP Location: Left arm)   Pulse 81   Temp 98  F (36.7  C) (Oral)   Resp 20   Ht 1.575 m (5' 2\")   Wt 68 kg (150 lb)   SpO2 95%   BMI 27.44 kg/m       Cardiac Rhythm: NSR    Pain Score: 3/10    Is the Patient Confused:  No    Last Food or Drink: 04/22/25 at now    Assessment and Plan of Care:  abx, o2 needs    Tests Performed: Done: Labs and Imaging    Treatments Provided:  o2, abx    Family Dynamics/Concerns: No, daughter at bedside    Belongings Checklist Done and Signed by Patient: N/A    Belongings Sent with Patient: yes    Boarding medications sent with patient: yes, has meds from home    Additional Information: ID consulted    RN: Bhavya Flores RN   4/22/2025 12:08 PM       "

## 2025-04-22 NOTE — PLAN OF CARE
Goal Outcome Evaluation:    [id number]     Pt vitals. She is on oxygen at 4 LPM sating in the low 90's. Her lung sounds are diminished. Nebs done by RT. She is on a regular diet. She has a Purewick in place. Her IV is saline locked.         Problem: Adult Inpatient Plan of Care  Goal: Optimal Comfort and Wellbeing  Outcome: Progressing     Problem: Delirium  Goal: Improved Attention and Thought Clarity  Outcome: Progressing     Problem: Skin Injury Risk Increased  Goal: Skin Health and Integrity  Outcome: Progressing     Problem: Delirium  Goal: Improved Sleep  Outcome: Progressing     Problem: Delirium  Goal: Optimal Coping  Outcome: Progressing

## 2025-04-22 NOTE — ED NOTES
Pt was not able to ambulate to the restroom, she is having severe abdominal pain.  Pt was able to use bedside commode, a purewick was placed after pt return to bed.

## 2025-04-22 NOTE — CONSULTS
Consultation - INFECTIOUS DISEASE CONSULTATION  Alexandra Mckeon,  1950, MRN [id number]      Acute hypoxemic respiratory failure (H) [J96.01]  Pneumonia of both lower lobes due to infectious organism [J18.9]    PCP: [facility], [facility], [phone]   Code status:  Full Code               Chief Complaint: Community-acquired pneumonia and possible diverticulitis.     Assessment:  Alexandra Mckeon is a 74 year old old female with   COPD  History of acute diverticulitis with perforation in 2024.  Was treated with IV antibiotic and discharged on on 2024 on Cipro plus Flagyl for 11 days  Possible recent diverticulitis.  Acute hypoxic respiratory failure requiring supplemental oxygen secondary to pneumonia.  Appears to have some COPD exacerbation associated with this.  On Levaquin plus Flagyl.  Also on steroid and bronchodilators.      Recommendations:   Continue on Levaquin and Flagyl for now.  COPD exacerbation management per primary.  Monitor oxygen need.    Discussed with the patient, family, nursing staff.    Thank you for letting us be part of the patient care team. We will follow.    Yuval Almonte MD,MD  [facility].   [facility]  Office Telephone [phone].  Fax [phone]  [facility] paging    HPI:    Alexandra Mckeon is a 74 year old old female. History is provided by patient, family, chart review.  The patient has a history of COPD.  She was diagnosed with diverticulitis with perforation in 2024 and was treated with IV antibiotic and discharged on p.o. Cipro and Flagyl for 11 days as discussed above.  A few days ago, the patient started to have a left lower quadrant abdominal pain associated with low-grade fever.  This was thought to be secondary to diverticulitis and the patient was started on antibiotic.  She is now admitted with worsening respiratory symptoms with bilateral pleuritic chest pain without fever at  this time.  On Levaquin and Flagyl.  Reports feeling better from admission.        ===========================================    Medical History  Past Medical History:   Diagnosis Date    COPD (chronic obstructive pulmonary disease) (H)     HLD (hyperlipidemia)     Hypertension         Surgical History  No significant past surgical history.         Social History  Reviewed, and she  reports that she quit smoking about 6 years ago. Her smoking use included cigarettes. She started smoking about 55 years ago. She has a 49 pack-year smoking history. She does not have any smokeless tobacco history on file.        Family History  No family history of recurrent infections.   Psychosocial Needs  Social History     Social History Narrative    Not on file     Additional psychosocial needs reviewed per nursing assessment.       Allergies   Allergen Reactions    Aspirin Hives    Cephalexin Hives    Ibuprofen Hives    Penicillin V Hives    Penicillins Hives    Potassium Hives        Medications Prior to Admission   Medication Sig Dispense Refill Last Dose/Taking    acetaminophen (TYLENOL) 325 MG tablet Take 1 tablet (325 mg) by mouth every 4 hours as needed for mild pain or fever. 30 tablet 0 4/22/2025 Morning    albuterol (PROAIR HFA/PROVENTIL HFA/VENTOLIN HFA) 108 (90 Base) MCG/ACT inhaler Inhale 1 puff into the lungs every 4 hours as needed.   4/21/2025 Morning    calcium carbonate (TUMS) 500 MG chewable tablet Take 1 chew tab by mouth 4 times daily as needed for heartburn.   4/21/2025 Morning    Calcium Magnesium Zinc 333-133-5 MG TABS Take 3 tablets by mouth daily.   4/21/2025 Morning    cyanocobalamin (VITAMIN B-12) 1000 MCG tablet Take 1,000 mcg by mouth daily.   4/21/2025 Morning    famotidine (PEPCID) 20 MG tablet Take 20 mg by mouth daily.   4/21/2025 Morning    losartan (COZAAR) 50 MG tablet Take 25 mg by mouth daily.   4/21/2025 Morning    polyethylene glycol (MIRALAX) 17 GM/Dose powder Take 17 g by mouth daily. 510  g 0 More than a month    potassium gluconate 2.5 MEQ tablet Take 1 tablet by mouth daily.   4/21/2025 Morning    senna-docusate (SENOKOT-S/PERICOLACE) 8.6-50 MG tablet Take 1 tablet by mouth 2 times daily as needed for constipation. 60 tablet 0 More than a month    TRELEGY ELLIPTA 100-62.5-25 MCG/ACT oral inhaler Inhale 1 puff into the lungs daily.   4/21/2025 Morning    simvastatin (ZOCOR) 20 MG tablet Take 20 mg by mouth at bedtime.   4/20/2025 Bedtime        Review of Systems:  Negative except for findings in the HPI Physical Exam:  Temp:  [97.5  F (36.4  C)-98.8  F (37.1  C)] 98  F (36.7  C)  Pulse:  [] 81  Resp:  [20-42] 20  BP: (122-180)/(69-99) 138/75  SpO2:  [89 %-96 %] 95 %        Gen: Pleasant in no acute distress.  HEENT: NCAT. EOMI. PERRL.  Neck: No bruit, JVD or thyromegaly.  Lungs: Bilateral wheezes as well as some crackles at the bases bilaterally.  Card: RRR. NSR. No RMG. Peripheral pulses present and symmetric. No edema.  Abd: Soft NT ND. No mass. Normal bowel sounds.  Skin: No rash.  Extr: No edema.  Neuro: Alert and oriented to place time and person. Cranial nerves II to XII intact. Motor and sensory intact.            ============================    Pertinent Labs  personally reviewed.   Recent Labs   Lab 04/22/25  0500   WBC 8.3   HGB 12.2   HCT 36.0          Recent Labs   Lab 04/22/25  0500   *   CO2 23   BUN 17.2   ALBUMIN 3.8   ALKPHOS 89   ALT 8   AST 18       MICROBIOLOGY DATA:  Personally reviewed   Contains abnormal data Swab Aerobic Bacterial Culture Routine  Order: [id number]  Collected 4/4/2024  1:15 PM       Status: Final result       Visible to patient: No (inaccessible in MyChart)    Specimen Information: Finger, Left; Swab   0 Result Notes  Culture 1+ Lelliottia amnigena Abnormal    1+ Lelliottia amnigena Abnormal    1+ Lelliottia amnigena Abnormal         Resulting Agency: [facility]     Susceptibility     Lelliottia amnigena (1) Lelliottia amnigena (2) Lelliottia  louise (3)     THEE THEE THEE     Amikacin <=2 ug/mL Susceptible * <=2 ug/mL Susceptible * <=2 ug/mL Susceptible *     Ampicillin <=2 ug/mL Susceptible <=2 ug/mL Susceptible <=2 ug/mL Susceptible     Ampicillin/ Sulbactam <=2 ug/mL Susceptible <=2 ug/mL Susceptible <=2 ug/mL Susceptible 1     Cefazolin <=4 ug/mL Intermediate * <=4 ug/mL Intermediate * <=4 ug/mL Intermediate *     Cefepime <=1 ug/mL Susceptible <=1 ug/mL Susceptible <=1 ug/mL Susceptible     Cefoxitin 8 ug/mL Susceptible * <=4 ug/mL Susceptible * 8 ug/mL Susceptible *     Ceftazidime <=1 ug/mL Susceptible <=1 ug/mL Susceptible <=1 ug/mL Susceptible     Ceftriaxone <=1 ug/mL Susceptible <=1 ug/mL Susceptible <=1 ug/mL Susceptible     Ciprofloxacin <=0.25 ug/mL Susceptible <=0.25 ug/mL Susceptible <=0.25 ug/mL Susceptible     Gentamicin <=1 ug/mL Susceptible <=1 ug/mL Susceptible <=1 ug/mL Susceptible     Levofloxacin <=0.12 ug/mL Susceptible <=0.12 ug/mL Susceptible <=0.12 ug/mL Susceptible     Meropenem <=0.25 ug/mL Susceptible <=0.25 ug/mL Susceptible <=0.25 ug/mL Susceptible     Nitrofurantoin <=16 ug/mL Susceptible * <=16 ug/mL Susceptible * <=16 ug/mL Susceptible *     Piperacillin/Tazobactam <=4 ug/mL Susceptible <=4 ug/mL Susceptible 0.5 ug/mL Susceptible     Tobramycin <=1 ug/mL Susceptible <=1 ug/mL Susceptible <=1 ug/mL Susceptible     Trimethoprim/Sulfamethoxazole <=1/19 ug/mL Susceptible <=1/19 ug/mL Susceptible <=1/19 ug/mL Susceptible                  * Suppressed Antibiotic   1 This is a corrected result. Previous result was Resistant (<=22 ug/mL) on 4/8/2024 at  7:36 AM CDT       Susceptibility Comments    Willie louise (3)           Specimen Collected: 04/04/24  1:15 PM Last Resulted: 04/09/24  8:01 AM           Pertinent Radiology  personally reviewed.   Study Result    Narrative & Impression   EXAM: CT ABDOMEN PELVIS W CONTRAST  LOCATION: [facility]  DATE: 4/22/2025     INDICATION: abd  pain  COMPARISON: 11/18/2024  TECHNIQUE: CT scan of the abdomen and pelvis was performed following injection of IV contrast. Multiplanar reformats were obtained. Dose reduction techniques were used.  CONTRAST: 90 cc Isovue-370     FINDINGS:   LOWER CHEST: See separate chest CT report for those details.     HEPATOBILIARY: Normal.     PANCREAS: Normal.     SPLEEN: Normal.     ADRENAL GLANDS: Normal.     KIDNEYS/BLADDER: Small benign renal cysts with no follow-up needed. 3 mm nonobstructing stone left kidney. No hydronephrosis. No bladder wall thickening. Small obturator hernia in the left containing a small portion of the bladder.     BOWEL: Large hiatal hernia. Small bowel is normal in caliber. Negative for appendicitis. Colon is partially fluid-filled suggestive of a nonspecific diarrheal state. Sigmoid diverticulosis. There is some very minimal stranding and edema in the left lower   quadrant adjacent to the sigmoid colon with a mild or low-grade diverticulitis not excluded.     LYMPH NODES: Normal.     VASCULATURE: Aortic calcification. Mild ectasia of the abdominal aorta measuring 2.7 cm, unchanged.     PELVIC ORGANS: Hysterectomy.     MUSCULOSKELETAL: Mild/moderate degenerative changes in the spine.                                                                      IMPRESSION:   1.  Colon is partially fluid-filled suggestive of a nonspecific diarrheal state.     2.  Sigmoid diverticulosis. There is some very minimal stranding seen in the left lower quadrant adjacent to the sigmoid colon. Cannot completely exclude a mild or low-grade diverticulitis and clinical correlation needed. No free air or fluid   collections.     3.  3 mm nonobstructing stone left kidney.     4.  Small left obturator hernia containing a small portion of the bladder.     5.  Large hiatal hernia.     6.  Hysterectomy.     7.  See separate chest CT report for those details.             Study Result    Narrative & Impression   EXAM: CT CHEST  PULMONARY EMBOLISM W CONTRAST  LOCATION: [facility]  DATE: 4/22/2025     INDICATION: R pleuritic chest pain  COMPARISON: None.  TECHNIQUE: CT chest pulmonary angiogram during arterial phase injection of IV contrast. Multiplanar reformats and MIP reconstructions were performed. Dose reduction techniques were used.   CONTRAST: 90 cc Isovue-370     FINDINGS:  ANGIOGRAM CHEST: Pulmonary arteries are normal caliber and negative for pulmonary emboli. Thoracic aorta is negative for dissection. No CT evidence of right heart strain.     LUNGS AND PLEURA: Moderate emphysematous changes in the lungs. Subpleural consolidation/infiltrate in both lower lobes, left greater than right. Findings suggest bilateral pneumonia. Generalized bronchial wall thickening in the lower lungs suggests   bronchiolitis. Minimal bilateral pleural effusions.     MEDIASTINUM/AXILLAE: Mild hilar and mediastinal adenopathy favored to be reactive in nature. Normal heart size. No pericardial effusion. Normal caliber thoracic aorta. Large hiatal hernia.     CORONARY ARTERY CALCIFICATION: Moderate to severe.     UPPER ABDOMEN: See separate abdomen and pelvis CT report for those details.     MUSCULOSKELETAL: Mild degenerative changes in the spine.                                                                      IMPRESSION:  1.  Negative for pulmonary embolism.     2.  Emphysematous changes in the lungs.     3.  Prominent consolidation/infiltrate in both lower lobes left greater than right suspicious for bilateral pneumonia.     4.  Generalized bronchial wall thickening suggests bronchiolitis.     5.  Minimal bilateral pleural effusions.     6.  Mild hilar and mediastinal adenopathy favored to be reactive in nature. Recommend follow-up chest CT in 2-3 months following appropriate therapy to monitor for resolution of these findings.     7.  Large hiatal hernia.

## 2025-04-22 NOTE — ED NOTES
Continued to desat abigail following Morphine administration. Placed pt on 2L of O2 via MD SINDY notified.

## 2025-04-22 NOTE — PHARMACY-ADMISSION MEDICATION HISTORY
Pharmacist Admission Medication History    Admission medication history is complete. The information provided in this note is only as accurate as the sources available at the time of the update.    Medication reconciliation/reorder completed by provider prior to medication history? No    Information Source(s): Patient and CareEverywhere/SureScripts via in-person    Pertinent Information:  patient states she finished her Cipro course yesterday    Changes made to PTA medication list:  Added: none  Deleted: none  Changed:none      Allergies reviewed with patient and updates made in EHR: yes    Medications available for use during hospital stay: inhalers    Medication History Completed By: Xander Hairston [facility] 4/22/2025 9:45 AM    PTA Med List   Medication Sig Note Last Dose/Taking    acetaminophen (TYLENOL) 325 MG tablet Take 1 tablet (325 mg) by mouth every 4 hours as needed for mild pain or fever.  4/22/2025 Morning    albuterol (PROAIR HFA/PROVENTIL HFA/VENTOLIN HFA) 108 (90 Base) MCG/ACT inhaler Inhale 1 puff into the lungs every 4 hours as needed.  4/21/2025 Morning    calcium carbonate (TUMS) 500 MG chewable tablet Take 1 chew tab by mouth 4 times daily as needed for heartburn.  4/21/2025 Morning    Calcium Magnesium Zinc 333-133-5 MG TABS Take 3 tablets by mouth daily.  4/21/2025 Morning    cyanocobalamin (VITAMIN B-12) 1000 MCG tablet Take 1,000 mcg by mouth daily.  4/21/2025 Morning    famotidine (PEPCID) 20 MG tablet Take 20 mg by mouth daily.  4/21/2025 Morning    losartan (COZAAR) 50 MG tablet Take 25 mg by mouth daily.  4/21/2025 Morning    polyethylene glycol (MIRALAX) 17 GM/Dose powder Take 17 g by mouth daily.  More than a month    potassium gluconate 2.5 MEQ tablet Take 1 tablet by mouth daily.  4/21/2025 Morning    senna-docusate (SENOKOT-S/PERICOLACE) 8.6-50 MG tablet Take 1 tablet by mouth 2 times daily as needed for constipation.  More than a month    TRELEGY ELLIPTA 100-62.5-25 MCG/ACT oral  inhaler Inhale 1 puff into the lungs daily. 4/22/2025: Will obtain own 4/21/2025 Morning

## 2025-04-22 NOTE — CONSULTS
"GI CONSULT NOTE      Name: Alexandra Mckeon    Medical Record #: [id number]    YOB: 1950    Date: 4/22/2025    Referring Provider: No ref. provider found     Reason for Consultation    CC: We were consulted by Dangelo Bee MD to see Alexandra Mckeon in regards to bowel pain, recent diverticulitis, abnormal CT scan.    HPI: This is a 74 year old female with a history of COPD, diverticulitis with perforation and resultant sepsis November 2024.  Brought in by worsening shortness of breath over the past week.  Chest CT scan shows what looks to be bilateral lower lobe pneumonia.  Also bothered by abdominal pain over the past week.  Initially told her daughter that pain was more right upper quadrant; it progressed to her left upper quadrant; says it seems to take her breath away at times.  Had temperature of 100 on 4/15.  Was seen at [facility] outpatient clinic, reported abdominal pain and was given antibiotics for presumed diverticulitis.  CT scan here shows partially fluid-filled colon, sigmoid diverticulosis with some minimal stranding in the left lower quadrant adjacent to the sigmoid colon; radiologically, unable to exclude mild or low-grade diverticulitis.  Reports chronic loose stools, passing 2-4 stools per day.    When admitted in November, the plan was to have an outpatient colonoscopy.  She was actually scheduled for this, though canceled, as she did not wish to complete the prep.  Reports having had colonoscopy in the distant past, unclear when or where; seems to recall polyps.    Large hiatal hernia also appreciated on CT scan.  Patient reports she has had heartburn \" my entire life\".  On famotidine at home.  No prior EGD.    Past medical history  Past Medical History:   Diagnosis Date    COPD (chronic obstructive pulmonary disease) (H)     HLD (hyperlipidemia)     Hypertension         Family history  Negative for colon cancer.     Social history  Social History     Tobacco Use    Smoking status: " "Former     Current packs/day: 0.00     Average packs/day: 1 pack/day for 49.0 years (49.0 ttl pk-yrs)     Types: Cigarettes     Start date:      Quit date:      Years since quittin.3       Medications:   Current Outpatient Medications   Medication Sig Dispense Refill    acetaminophen (TYLENOL) 325 MG tablet Take 1 tablet (325 mg) by mouth every 4 hours as needed for mild pain or fever. 30 tablet 0    albuterol (PROAIR HFA/PROVENTIL HFA/VENTOLIN HFA) 108 (90 Base) MCG/ACT inhaler Inhale 1 puff into the lungs every 4 hours as needed.      calcium carbonate (TUMS) 500 MG chewable tablet Take 1 chew tab by mouth 4 times daily as needed for heartburn.      Calcium Magnesium Zinc 333-133-5 MG TABS Take 3 tablets by mouth daily.      cyanocobalamin (VITAMIN B-12) 1000 MCG tablet Take 1,000 mcg by mouth daily.      famotidine (PEPCID) 20 MG tablet Take 20 mg by mouth daily.      losartan (COZAAR) 50 MG tablet Take 25 mg by mouth daily.      polyethylene glycol (MIRALAX) 17 GM/Dose powder Take 17 g by mouth daily. 510 g 0    potassium gluconate 2.5 MEQ tablet Take 1 tablet by mouth daily.      senna-docusate (SENOKOT-S/PERICOLACE) 8.6-50 MG tablet Take 1 tablet by mouth 2 times daily as needed for constipation. 60 tablet 0    TRELEGY ELLIPTA 100-62.5-25 MCG/ACT oral inhaler Inhale 1 puff into the lungs daily.      simvastatin (ZOCOR) 20 MG tablet Take 20 mg by mouth at bedtime.            Allergies:    Allergies   Allergen Reactions    Aspirin Hives    Cephalexin Hives    Ibuprofen Hives    Penicillin V Hives    Penicillins Hives    Potassium Hives          REVIEW OF SYSTEMS (ROS): Review of systems is as per HPI.  Remainder of complete review of systems is negative.      PHYSICAL EXAMINATION:      /75 (BP Location: Left arm)   Pulse 81   Temp 98  F (36.7  C) (Oral)   Resp 20   Ht 1.575 m (5' 2\")   Wt 68 kg (150 lb)   SpO2 95%   BMI 27.44 kg/m    GEN: Well developed, well nourished 74 year old " appearing uncomfortable and slightly restless.  HEENT: sclera anicteric, moist mucous membranes  PULM: On supplemental oxygen  CARDIO: Regular rate and rhythm  GI: Non-distended.  Soft.  Very mildly tender to palpation left lower quadrant.  Even less tender in upper abdomen, bilaterally.  No guarding.  EXT: warm, bilateral lower extremity edema right greater than left  NEURO: Alert and oriented.  Speech fluid.    PSYCH: Mental status appropriate, mood and affect normal.      LABS and IMAGING  Recent Labs   Lab 04/22/25  0500   WBC 8.3   RBC 4.22   HGB 12.2   HCT 36.0   MCV 85   MCH 28.9   MCHC 33.9   RDW 13.4         Recent Labs   Lab 04/22/25  0500   *   CO2 23   BUN 17.2     Recent Labs   Lab 04/22/25  0500   ALKPHOS 89   AST 18   ALT 8     Lab Results   Component Value Date    INR 1.21 (H) 11/17/2024       CT Abdomen Pelvis w Contrast    Result Date: 4/22/2025  EXAM: CT ABDOMEN PELVIS W CONTRAST LOCATION: [facility] DATE: 4/22/2025 INDICATION: abd pain COMPARISON: 11/18/2024 TECHNIQUE: CT scan of the abdomen and pelvis was performed following injection of IV contrast. Multiplanar reformats were obtained. Dose reduction techniques were used. CONTRAST: 90 cc Isovue-370 FINDINGS: LOWER CHEST: See separate chest CT report for those details. HEPATOBILIARY: Normal. PANCREAS: Normal. SPLEEN: Normal. ADRENAL GLANDS: Normal. KIDNEYS/BLADDER: Small benign renal cysts with no follow-up needed. 3 mm nonobstructing stone left kidney. No hydronephrosis. No bladder wall thickening. Small obturator hernia in the left containing a small portion of the bladder. BOWEL: Large hiatal hernia. Small bowel is normal in caliber. Negative for appendicitis. Colon is partially fluid-filled suggestive of a nonspecific diarrheal state. Sigmoid diverticulosis. There is some very minimal stranding and edema in the left lower  quadrant adjacent to the sigmoid colon with a mild or low-grade diverticulitis  not excluded. LYMPH NODES: Normal. VASCULATURE: Aortic calcification. Mild ectasia of the abdominal aorta measuring 2.7 cm, unchanged. PELVIC ORGANS: Hysterectomy. MUSCULOSKELETAL: Mild/moderate degenerative changes in the spine.     IMPRESSION: 1.  Colon is partially fluid-filled suggestive of a nonspecific diarrheal state. 2.  Sigmoid diverticulosis. There is some very minimal stranding seen in the left lower quadrant adjacent to the sigmoid colon. Cannot completely exclude a mild or low-grade diverticulitis and clinical correlation needed. No free air or fluid collections. 3.  3 mm nonobstructing stone left kidney. 4.  Small left obturator hernia containing a small portion of the bladder. 5.  Large hiatal hernia. 6.  Hysterectomy. 7.  See separate chest CT report for those details.         CT Chest Pulmonary Embolism w Contrast    Result Date: 4/22/2025  EXAM: CT CHEST PULMONARY EMBOLISM W CONTRAST LOCATION: [facility] DATE: 4/22/2025 INDICATION: R pleuritic chest pain COMPARISON: None. TECHNIQUE: CT chest pulmonary angiogram during arterial phase injection of IV contrast. Multiplanar reformats and MIP reconstructions were performed. Dose reduction techniques were used. CONTRAST: 90 cc Isovue-370 FINDINGS: ANGIOGRAM CHEST: Pulmonary arteries are normal caliber and negative for pulmonary emboli. Thoracic aorta is negative for dissection. No CT evidence of right heart strain. LUNGS AND PLEURA: Moderate emphysematous changes in the lungs. Subpleural consolidation/infiltrate in both lower lobes, left greater than right. Findings suggest bilateral pneumonia. Generalized bronchial wall thickening in the lower lungs suggests bronchiolitis. Minimal bilateral pleural effusions. MEDIASTINUM/AXILLAE: Mild hilar and mediastinal adenopathy favored to be reactive in nature. Normal heart size. No pericardial effusion. Normal caliber thoracic aorta. Large hiatal hernia. CORONARY ARTERY CALCIFICATION:  Moderate to severe. UPPER ABDOMEN: See separate abdomen and pelvis CT report for those details. MUSCULOSKELETAL: Mild degenerative changes in the spine.     IMPRESSION: 1.  Negative for pulmonary embolism. 2.  Emphysematous changes in the lungs. 3.  Prominent consolidation/infiltrate in both lower lobes left greater than right suspicious for bilateral pneumonia. 4.  Generalized bronchial wall thickening suggests bronchiolitis. 5.  Minimal bilateral pleural effusions. 6.  Mild hilar and mediastinal adenopathy favored to be reactive in nature. Recommend follow-up chest CT in 2-3 months following appropriate therapy to monitor for resolution of these findings. 7.  Large hiatal hernia.           ASSESSMENT  This is a 74 year old female with a history of COPD, diverticulitis with perforation and resultant sepsis November 2024.  Brought in for worsening shortness of breath and abdominal pain over the past week.    1. Abd pain, multifocal.  Has pain in multiple areas of her abdomen.  Most bothersome pain is in her upper abdomen; she points towards her lower chest bilaterally as the region involved.  CT shows what appears to be bilateral lower lobe pneumonia.  Since she reports sometimes the pain can take her breath away, this could be pleuritic in nature.  She had mention right upper quadrant pain last week, though LFTs and lipase are normal.  No evidence of pancreatitis or bile duct dilation on CT.  She also mentions some left lower quadrant abdominal pain, that is different than the pain she felt when she had her perforated diverticulitis November 2024.  Current CT does not show any overwhelming evidence of diverticulitis; however, notes some mild stranding adjacent to the sigmoid colon that could potentially represent some mild diverticulitis.  Was placed on antibiotics for possible diverticulitis when seen at her entire clinic last week; reasonable to finish the course of antibiotics.    2.  History of  "diverticulitis  Perforated diverticulitis November 2024.  She has not had follow-up colonoscopy thereafter.  We talked about the importance of doing this.  Her daughter strongly supportive of her mother completing the colonoscopy; getting the patient to prep will be a challenge in the future.    3.  Large hiatal hernia  4.  Reflux symptoms  Found on CT.  Reports heartburn her \"whole life\".  On famotidine at home.  Will place on PPI here.  Given the duration of symptoms and the large hiatal hernia, can do outpatient EGD at time of outpatient colonoscopy    Patient Active Problem List   Diagnosis    Diverticulitis of colon with perforation    Acute sepsis (H)    Chronic obstructive pulmonary disease (H)    Essential hypertension    Hyperlipidemia    Stage 3 chronic kidney disease (H)    Acute hypoxemic respiratory failure (H)    Pneumonia of both lower lobes due to infectious organism     PLAN  1.  Continue antibiotics  2.  Low fiber diet for the next 2-3 weeks, then gradually increase thereafter.  3.  Outpatient colonoscopy in approximately 2 months.  4.  EGD to be done at time of outpatient colonoscopy for further evaluation of chronic reflux symptoms and large hiatal hernia.  5.  Add PPI.    Will discuss with Dr. Silverio and update chart with any she will information.  Thank you for asking us to consult on this patient.    Cj Turcios PA-C   4/22/2025 11:43 AM  [facility]  [phone]     A total of 45 min was spent on today's care.     Seen and examined at bedside  Agree with Cj Turcios PAC    75 yo woman w hx of complicated diverticulitis in 11/24, now with pleuritic chest/abdominal pain and some lower left abdominal pain  CT shows pneumonia and possible mild diverticulitis, though had already been on antibiotics so perhaps an improving diverticulitis.  Large HH noted as well.    Pneumonia seems to be driving admission and she is on appropriate antibiotics.    Favor outpatient EGD/Colonoscopy in " about 2 months after pneumonia/diverticulitis have resolved.    Gi will check on her tomorrow.  OK to eat.  Thank you   JBS  15 minutes spent.

## 2025-04-22 NOTE — ED NOTES
Reassessed pt. Pt reports pain is down to 1/10 after Dilaudid. Pt appears to be more comfortable in bed, pt's daughter also says she has stopped moaning in pain.

## 2025-04-23 ENCOUNTER — APPOINTMENT (OUTPATIENT)
Dept: OCCUPATIONAL THERAPY | Facility: CLINIC | Age: 75
DRG: 193 | End: 2025-04-23
Attending: HOSPITALIST
Payer: COMMERCIAL

## 2025-04-23 ENCOUNTER — APPOINTMENT (OUTPATIENT)
Dept: PHYSICAL THERAPY | Facility: CLINIC | Age: 75
DRG: 193 | End: 2025-04-23
Attending: HOSPITALIST
Payer: COMMERCIAL

## 2025-04-23 LAB
ANION GAP SERPL CALCULATED.3IONS-SCNC: 9 MMOL/L (ref 7–15)
BUN SERPL-MCNC: 15.3 MG/DL (ref 8–23)
CALCIUM SERPL-MCNC: 10 MG/DL (ref 8.8–10.4)
CHLORIDE SERPL-SCNC: 102 MMOL/L (ref 98–107)
CREAT SERPL-MCNC: 0.87 MG/DL (ref 0.51–0.95)
EGFRCR SERPLBLD CKD-EPI 2021: 69 ML/MIN/1.73M2
ERYTHROCYTE [DISTWIDTH] IN BLOOD BY AUTOMATED COUNT: 13.2 % (ref 10–15)
GLUCOSE SERPL-MCNC: 149 MG/DL (ref 70–99)
HCO3 SERPL-SCNC: 26 MMOL/L (ref 22–29)
HCT VFR BLD AUTO: 37.6 % (ref 35–47)
HGB BLD-MCNC: 12.6 G/DL (ref 11.7–15.7)
MCH RBC QN AUTO: 28.8 PG (ref 26.5–33)
MCHC RBC AUTO-ENTMCNC: 33.5 G/DL (ref 31.5–36.5)
MCV RBC AUTO: 86 FL (ref 78–100)
PLATELET # BLD AUTO: 321 10E3/UL (ref 150–450)
POTASSIUM SERPL-SCNC: 4.5 MMOL/L (ref 3.4–5.3)
RBC # BLD AUTO: 4.38 10E6/UL (ref 3.8–5.2)
SODIUM SERPL-SCNC: 137 MMOL/L (ref 135–145)
WBC # BLD AUTO: 6.6 10E3/UL (ref 4–11)

## 2025-04-23 PROCEDURE — 94640 AIRWAY INHALATION TREATMENT: CPT

## 2025-04-23 PROCEDURE — 250N000013 HC RX MED GY IP 250 OP 250 PS 637: Performed by: HOSPITALIST

## 2025-04-23 PROCEDURE — 250N000012 HC RX MED GY IP 250 OP 636 PS 637: Performed by: STUDENT IN AN ORGANIZED HEALTH CARE EDUCATION/TRAINING PROGRAM

## 2025-04-23 PROCEDURE — 250N000009 HC RX 250: Performed by: STUDENT IN AN ORGANIZED HEALTH CARE EDUCATION/TRAINING PROGRAM

## 2025-04-23 PROCEDURE — 99232 SBSQ HOSP IP/OBS MODERATE 35: CPT | Performed by: HOSPITALIST

## 2025-04-23 PROCEDURE — 999N000157 HC STATISTIC RCP TIME EA 10 MIN

## 2025-04-23 PROCEDURE — 120N000001 HC R&B MED SURG/OB

## 2025-04-23 PROCEDURE — 83036 HEMOGLOBIN GLYCOSYLATED A1C: CPT | Performed by: HOSPITALIST

## 2025-04-23 PROCEDURE — 99232 SBSQ HOSP IP/OBS MODERATE 35: CPT | Performed by: STUDENT IN AN ORGANIZED HEALTH CARE EDUCATION/TRAINING PROGRAM

## 2025-04-23 PROCEDURE — 250N000013 HC RX MED GY IP 250 OP 250 PS 637: Performed by: PHYSICIAN ASSISTANT

## 2025-04-23 PROCEDURE — 97161 PT EVAL LOW COMPLEX 20 MIN: CPT | Mod: GP

## 2025-04-23 PROCEDURE — 94640 AIRWAY INHALATION TREATMENT: CPT | Mod: 76

## 2025-04-23 PROCEDURE — 85027 COMPLETE CBC AUTOMATED: CPT | Performed by: STUDENT IN AN ORGANIZED HEALTH CARE EDUCATION/TRAINING PROGRAM

## 2025-04-23 PROCEDURE — 97530 THERAPEUTIC ACTIVITIES: CPT | Mod: GP

## 2025-04-23 PROCEDURE — 36415 COLL VENOUS BLD VENIPUNCTURE: CPT | Performed by: STUDENT IN AN ORGANIZED HEALTH CARE EDUCATION/TRAINING PROGRAM

## 2025-04-23 PROCEDURE — 250N000011 HC RX IP 250 OP 636: Mod: JZ | Performed by: STUDENT IN AN ORGANIZED HEALTH CARE EDUCATION/TRAINING PROGRAM

## 2025-04-23 PROCEDURE — 97116 GAIT TRAINING THERAPY: CPT | Mod: GP

## 2025-04-23 PROCEDURE — 97535 SELF CARE MNGMENT TRAINING: CPT | Mod: GO

## 2025-04-23 PROCEDURE — 80048 BASIC METABOLIC PNL TOTAL CA: CPT | Performed by: STUDENT IN AN ORGANIZED HEALTH CARE EDUCATION/TRAINING PROGRAM

## 2025-04-23 PROCEDURE — 250N000013 HC RX MED GY IP 250 OP 250 PS 637: Performed by: STUDENT IN AN ORGANIZED HEALTH CARE EDUCATION/TRAINING PROGRAM

## 2025-04-23 PROCEDURE — 97166 OT EVAL MOD COMPLEX 45 MIN: CPT | Mod: GO

## 2025-04-23 RX ORDER — LEVOFLOXACIN 750 MG/1
750 TABLET, FILM COATED ORAL DAILY
Status: DISCONTINUED | OUTPATIENT
Start: 2025-04-24 | End: 2025-04-25 | Stop reason: HOSPADM

## 2025-04-23 RX ORDER — PANTOPRAZOLE SODIUM 40 MG/1
40 TABLET, DELAYED RELEASE ORAL
Status: DISCONTINUED | OUTPATIENT
Start: 2025-04-23 | End: 2025-04-25 | Stop reason: HOSPADM

## 2025-04-23 RX ORDER — METRONIDAZOLE 500 MG/1
500 TABLET ORAL 3 TIMES DAILY
Status: DISCONTINUED | OUTPATIENT
Start: 2025-04-23 | End: 2025-04-25 | Stop reason: HOSPADM

## 2025-04-23 RX ADMIN — LOSARTAN POTASSIUM 25 MG: 25 TABLET, FILM COATED ORAL at 08:33

## 2025-04-23 RX ADMIN — METRONIDAZOLE 500 MG: 500 INJECTION, SOLUTION INTRAVENOUS at 00:01

## 2025-04-23 RX ADMIN — ACETAMINOPHEN 650 MG: 325 TABLET, FILM COATED ORAL at 08:42

## 2025-04-23 RX ADMIN — PREDNISONE 40 MG: 20 TABLET ORAL at 08:32

## 2025-04-23 RX ADMIN — IPRATROPIUM BROMIDE AND ALBUTEROL SULFATE 3 ML: .5; 3 SOLUTION RESPIRATORY (INHALATION) at 16:16

## 2025-04-23 RX ADMIN — PANTOPRAZOLE SODIUM 40 MG: 40 TABLET, DELAYED RELEASE ORAL at 12:49

## 2025-04-23 RX ADMIN — LEVOFLOXACIN 750 MG: 5 INJECTION, SOLUTION INTRAVENOUS at 08:29

## 2025-04-23 RX ADMIN — METRONIDAZOLE 500 MG: 500 TABLET ORAL at 16:48

## 2025-04-23 RX ADMIN — IPRATROPIUM BROMIDE AND ALBUTEROL SULFATE 3 ML: .5; 3 SOLUTION RESPIRATORY (INHALATION) at 12:25

## 2025-04-23 RX ADMIN — METRONIDAZOLE 500 MG: 500 TABLET ORAL at 21:39

## 2025-04-23 RX ADMIN — IPRATROPIUM BROMIDE AND ALBUTEROL SULFATE 3 ML: .5; 3 SOLUTION RESPIRATORY (INHALATION) at 19:25

## 2025-04-23 RX ADMIN — METRONIDAZOLE 500 MG: 500 INJECTION, SOLUTION INTRAVENOUS at 10:12

## 2025-04-23 RX ADMIN — ENOXAPARIN SODIUM 40 MG: 40 INJECTION SUBCUTANEOUS at 08:33

## 2025-04-23 RX ADMIN — SIMVASTATIN 20 MG: 20 TABLET, FILM COATED ORAL at 21:39

## 2025-04-23 RX ADMIN — IPRATROPIUM BROMIDE AND ALBUTEROL SULFATE 3 ML: .5; 3 SOLUTION RESPIRATORY (INHALATION) at 08:04

## 2025-04-23 ASSESSMENT — ACTIVITIES OF DAILY LIVING (ADL)
ADLS_ACUITY_SCORE: 38

## 2025-04-23 NOTE — PLAN OF CARE
Problem: Adult Inpatient Plan of Care  Goal: Plan of Care Review  Description: The Plan of Care Review/Shift note should be completed every shift.  The Outcome Evaluation is a brief statement about your assessment that the patient is improving, declining, or no change.  This information will be displayed automatically on your shiftnote.  Outcome: Progressing  Flowsheets (Taken 4/23/2025 1716)  Plan of Care Reviewed With: patient  Goal: Optimal Comfort and Wellbeing  Outcome: Progressing  Intervention: Provide Person-Centered Care  Recent Flowsheet Documentation  Taken 4/23/2025 1620 by Memo Cabrera RN  Trust Relationship/Rapport:   care explained   choices provided   questions answered   questions encouraged  Taken 4/23/2025 0852 by Memo Cabrera RN  Trust Relationship/Rapport:   care explained   choices provided   questions answered   questions encouraged     Problem: Delirium  Goal: Optimal Coping  Outcome: Progressing  Goal: Improved Behavioral Control  Outcome: Progressing  Intervention: Minimize Safety Risk  Recent Flowsheet Documentation  Taken 4/23/2025 1620 by Memo Cabrera RN  Enhanced Safety Measures:   pain management   room near unit station  Trust Relationship/Rapport:   care explained   choices provided   questions answered   questions encouraged  Taken 4/23/2025 0852 by Memo Cabrera RN  Enhanced Safety Measures:   pain management   room near unit station  Trust Relationship/Rapport:   care explained   choices provided   questions answered   questions encouraged  Goal: Improved Attention and Thought Clarity  Outcome: Progressing     Problem: Skin Injury Risk Increased  Goal: Skin Health and Integrity  Outcome: Progressing  Intervention: Plan: Nurse Driven Intervention: Moisture Management  Recent Flowsheet Documentation  Taken 4/23/2025 1620 by Memo Cabrera RN  Moisture Interventions:   Encourage regular toileting   No brief in bed  Taken 4/23/2025 0852 by Memo Cabrera RN  Moisture Interventions:    Encourage regular toileting   No brief in bed  Intervention: Plan: Nurse Driven Intervention: Friction and Shear  Recent Flowsheet Documentation  Taken 4/23/2025 1620 by Memo Cabrera RN  Friction/Shear Interventions: HOB 30 degrees or less  Taken 4/23/2025 0852 by Memo Cabrera, SARAH  Friction/Shear Interventions: HOB 30 degrees or less  Intervention: Optimize Skin Protection  Recent Flowsheet Documentation  Taken 4/23/2025 1620 by Memo Cabrera, RN  Activity Management:   ambulated to bathroom   back to bed  Taken 4/23/2025 0852 by Memo Cabrera RN  Activity Management:   ambulated to bathroom   back to bed     Problem: Gas Exchange Impaired  Goal: Optimal Gas Exchange  Outcome: Progressing     Goal Outcome Evaluation:      Plan of Care Reviewed With: patient     Vitally stable, tylenol given for abdominal pain. Patient continues to need 3L/NC O2. PT/OT eval done today.

## 2025-04-23 NOTE — PLAN OF CARE
ATTN: Case Management  RE: Referral for Home Health    As of 12/30/24, we have accepted the Home Health referral for the patient listed above.    [facility]  will contact the patient within 48 hours. If you have any questions or concerns regarding the patient’s transition to Home Health, please do not hesitate to contact us at [phone].      We look forward to collaborating with you,  [facility] Team   Goal Outcome Evaluation:       Alert and oriented *4. Vital signs stable. On 3 L NC with SpO2 at 94%. Patient desaturates to 86% while ambulating to the bathroom. SpO2 increases with 3.5-4L NC.     Patient has a frequent productive cough. Diminished lung sounds with expiratory wheezes.     Flagyl given overnight. IV saline locked.     Regular diet.   Assist of 1 with gait belt.      Problem: Delirium  Goal: Optimal Coping  Outcome: Progressing  Goal: Improved Behavioral Control  Outcome: Progressing  Intervention: Minimize Safety Risk  Recent Flowsheet Documentation  Taken 4/23/2025 0000 by Gwen Hunt RN  Enhanced Safety Measures:   pain management   room near unit station  Trust Relationship/Rapport:   care explained   choices provided   questions answered   questions encouraged  Goal: Improved Attention and Thought Clarity  Outcome: Progressing  Goal: Improved Sleep  Outcome: Progressing     Problem: Skin Injury Risk Increased  Goal: Skin Health and Integrity  Outcome: Progressing  Intervention: Plan: Nurse Driven Intervention: Moisture Management  Recent Flowsheet Documentation  Taken 4/23/2025 0000 by Gwen Hunt RN  Moisture Interventions:   Encourage regular toileting   No brief in bed  Taken 4/22/2025 2125 by Gwen Hunt RN  Moisture Interventions:   Encourage regular toileting   No brief in bed  Intervention: Plan: Nurse Driven Intervention: Friction and Shear  Recent Flowsheet Documentation  Taken 4/23/2025 0000 by Gwen Hunt RN  Friction/Shear Interventions: HOB 30 degrees or less  Intervention: Optimize Skin Protection  Recent Flowsheet Documentation  Taken 4/23/2025 0000 by Gwen Hunt, RN  Activity Management:   ambulated to bathroom   back to bed  Taken 4/22/2025 2125 by Gwen Hunt RN  Activity Management:   ambulated to bathroom   back to bed     Problem: Gas Exchange Impaired  Goal: Optimal Gas Exchange  Outcome: Progressing

## 2025-04-23 NOTE — PROGRESS NOTES
[facility]       Patient:  Alexandra Mckeon  Date of birth 1950, Medical record number [id number]  Date of Visit:  2025  Attending Physician: Dinesh Sánchez MD         Assessment and Recommendations:   Assessment:  Alexandra Mckeon is a 74 year old female with   COPD  History of acute diverticulitis with perforation in 2024.  Was treated with IV antibiotic and discharged on on 2024 on Cipro plus Flagyl for 11 days  Possible recent diverticulitis.  Acute hypoxic respiratory failure requiring supplemental oxygen secondary to pneumonia.  Appears to have some COPD exacerbation associated with this.  On Levaquin plus Flagyl.  Also on steroid and bronchodilators.  Clinically improving.  Improving pleuritic chest pain.    Recommendations:  Continue on Levaquin and Flagyl for now.  COPD exacerbation management per primary.  Monitor oxygen need.    Discussed with the patient, nursing staff.    ID will follow.    Yuval Almonte MD.  [facility].   [facility]  Office Telephone [phone].  Fax [phone]  [facility] paging            Interval History:     HPI:  The interval history was reviewed.   Feeling better today.  Improving pleuritic chest pain.  Having more energy today.  Cultures reviewed.    Pertinent cultures include:  No results found for: \"CULT\"    Recent Inflammatory Biomarkers:   Recent Labs   Lab Test 25  0858 25  0500 24  0700 24  0729 24  0630 24  2357   PCAL  --   --   --   --   --  0.41   WBC 6.6 8.3 7.4 12.3* 13.5* 19.6*            Review of Systems:   CONSTITUTIONAL:    Temp Max: Temp (24hrs), Av.3  F (36.8  C), Min:97.9  F (36.6  C), Max:98.7  F (37.1  C)   .  Negative except for findings in the HPI.           Current Medications (antimicrobials listed in bold):     Current Facility-Administered Medications   Medication Dose Route " Frequency Provider Last Rate Last Admin    enoxaparin ANTICOAGULANT (LOVENOX) injection 40 mg  40 mg Subcutaneous Q24H Dangelo Bee MD   40 mg at 04/23/25 0833    Fluticasone-Umeclidin-Vilant (TRELEGY ELLIPTA) 100-62.5-25 MCG/ACT oral inhaler 1 puff  1 puff Inhalation Daily Dangelo Bee MD   1 puff at 04/23/25 0838    ipratropium - albuterol 0.5 mg/2.5 mg/3 mL (DUONEB) neb solution 3 mL  3 mL Nebulization 4x daily Dangelo Bee MD   3 mL at 04/23/25 0804    levofloxacin (LEVAQUIN) infusion 750 mg  750 mg Intravenous Q24H Dangelo Bee  mL/hr at 04/23/25 0829 750 mg at 04/23/25 0829    losartan (COZAAR) tablet 25 mg  25 mg Oral Daily Dangelo Bee MD   25 mg at 04/23/25 0833    metroNIDAZOLE (FLAGYL) infusion 500 mg  500 mg Intravenous Q8H Dangelo Bee MD   500 mg at 04/23/25 0001    predniSONE (DELTASONE) tablet 40 mg  40 mg Oral Daily Dangelo Bee MD   40 mg at 04/23/25 0832    simvastatin (ZOCOR) tablet 20 mg  20 mg Oral At Bedtime Dangelo Bee MD   20 mg at 04/22/25 2124    sodium chloride (PF) 0.9% PF flush 3 mL  3 mL Intracatheter Q8H [facility] Dangelo Bee MD   3 mL at 04/22/25 2347              Allergies:     Allergies   Allergen Reactions    Aspirin Hives    Cephalexin Hives    Ibuprofen Hives    Penicillin V Hives    Penicillins Hives    Potassium Hives            Physical Exam:   Vitals were reviewed  Patient Vitals for the past 24 hrs:   BP Temp Temp src Pulse Resp SpO2   04/23/25 0817 (!) 142/65 97.9  F (36.6  C) Oral 97 19 92 %   04/23/25 0804 -- -- -- -- -- 95 %   04/23/25 0013 110/60 98.5  F (36.9  C) Oral 82 18 94 %   04/22/25 2125 -- -- -- -- -- 93 %   04/22/25 1943 -- -- -- -- -- 95 %   04/22/25 1639 -- -- -- 92 24 94 %   04/22/25 1613 128/60 98.7  F (37.1  C) Oral 87 22 94 %   04/22/25 1540 -- -- -- -- -- 96 %   04/22/25 1125 138/75 98  F (36.7  C) Oral 81 20 95 %       Physical Examination:  Gen: Pleasant in no acute distress.  HEENT: NCAT. EOMI. PERRL.  Neck: No bruit, JVD or  thyromegaly.  Lungs: Crackles at the bases bilaterally.  Improved wheezes.  Card: RRR. NSR. No RMG. Peripheral pulses present and symmetric. No edema.  Abd: Soft NT ND. No mass. Normal bowel sounds.  Skin: No rash.  Extr: No edema.  Neuro: Alert and oriented to place time and person. Cranial nerves II to XII intact.         Laboratory Data:   ID Labs:  Microbiology labs:  Reviewed.    No lab results found.  Recent Labs   Lab Test 04/23/25  0858 04/22/25  0500 11/25/24  0700 11/20/24  0729 11/19/24  0630 11/17/24  2357   WBC 6.6 8.3 7.4 12.3* 13.5* 19.6*     Recent Labs   Lab Test 04/23/25  0858 04/22/25  0500 12/12/24  1446 11/25/24  0700   CR 0.87 0.88 0.95 0.89   GFRESTIMATED 69 68 62 67       Hematology Studies  Recent Labs   Lab Test 04/23/25  0858 04/22/25  0500 11/25/24  0700 11/20/24  0729 11/19/24  0630 11/17/24  2357   WBC 6.6 8.3 7.4 12.3* 13.5* 19.6*   ANEU  --  5.2 4.5  --   --  16.8*   AEOS  --  0.8* 0.4  --   --  0.0   HGB 12.6 12.2 12.3 10.8* 10.9* 13.3   HCT 37.6 36.0 38.7 32.7* 33.4* 39.3    310 323 194 170 207       Metabolic  Recent Labs   Lab Test 04/23/25  0858 04/22/25  0500 12/12/24  1446    133* 138   BUN 15.3 17.2 13.4   CO2 26 23 25   CR 0.87 0.88 0.95   GFRESTIMATED 69 68 62       Hepatic Studies  Recent Labs   Lab Test 04/22/25  0500 11/17/24  2357 02/01/23  1335   BILITOTAL 0.3 1.0 0.3   ALKPHOS 89 86 101   ALBUMIN 3.8 4.2 4.4   AST 18 24 21   ALT 8 16 17       ImmunologlobulinsNo lab results found.         Imaging Data:   Reviewed

## 2025-04-23 NOTE — PROGRESS NOTES
04/23/25 1515   Appointment Info   Signing Clinician's Name / Credentials (OT) ASTRID Jay/L, CLT   Rehab Comments (OT) OT eval   Living Environment   People in Home child(hector), adult   Current Living Arrangements house   Self-Care   Usual Activity Tolerance moderate   Current Activity Tolerance fair   Equipment Currently Used at Home raised toilet seat  (pedistal sink near toilet and new shower WIS and seat and grab bar.)   Activity/Exercise/Self-Care Comment Pt is typically independent with ADL at home. Lives with son.   General Information   Onset of Illness/Injury or Date of Surgery 04/22/25   Referring Physician Dr. Sánchez   Patient/Family Therapy Goal Statement (OT) hopes to return home   Additional Occupational Profile Info/Pertinent History of Current Problem 74 year old female admitted on 4/22/2025. She past medical history significant for COPD, hypertension, hyperlipidemia, and history of diverticulitis with perforation presented to [facility] with abdominal pain worsening over the last week, of note reports just finished abx for diverticulitis, she was subsequently found to have pneumonia but imaging showing possible low grade diverticulitis in the setting of recent treatment, GI consulted.per chart.   Existing Precautions/Restrictions other (see comments)  (currently on 3L O2)   Cognitive Status Examination   Affect/Mental Status (Cognitive) WFL   Visual Perception   Visual Impairment/Limitations WFL   Sensory   Sensory Quick Adds sensation intact   Pain Assessment   Patient Currently in Pain No   Posture   Posture not impaired   Range of Motion Comprehensive   General Range of Motion no range of motion deficits identified   Strength Comprehensive (MMT)   Comment, General Manual Muscle Testing (MMT) Assessment generalized weakness   Muscle Tone Assessment   Muscle Tone Quick Adds No deficits were identified   Coordination   Upper Extremity Coordination No deficits were identified   Bed  Mobility   Comment (Bed Mobility) SBA   Transfers   Transfers toilet transfer   Toilet Transfer   [place] Level (Toilet Transfer) minimum assist (75% patient effort)   Balance   Balance Comments decreased   Activities of Daily Living   BADL Assessment/Intervention lower body dressing;toileting;grooming   Lower Body Dressing Assessment/Training   [place] Level (Lower Body Dressing) supervision   Grooming Assessment/Training   [place] Level (Grooming) supervision   Toileting   [place] Level (Toileting) supervision   Clinical Impression   Criteria for Skilled Therapeutic Interventions Met (OT) Yes, treatment indicated   OT Diagnosis decreased ADL independence/safety.   OT Problem List-Impairments impacting ADL activity tolerance impaired   Assessment of Occupational Performance 3-5 Performance Deficits   Identified Performance Deficits dressing, toileting, bathing, functional mobility, bed mobility   Planned Therapy Interventions (OT) ADL retraining   Clinical Decision Making Complexity (OT) detailed assessment/moderate complexity   Risk & Benefits of therapy have been explained care plan/treatment goals reviewed;patient   OT Total Evaluation Time   OT Eval, Moderate Complexity Minutes (67641) 10   OT Goals   Therapy Frequency (OT) 4 times/week   OT Predicted Duration/Target Date for Goal Attainment 04/29/25   OT Goals Lower Body Dressing;Toilet Transfer/Toileting   OT: Lower Body Dressing Modified independent   OT: Toilet Transfer/Toileting Modified independent;toilet transfer;cleaning and garment management  (with rail)   Interventions   Interventions Quick Adds Self-Care/Home Management   Self-Care/Home Management   Self-Care/Home Mgmt/ADL, Compensatory, Meal Prep Minutes (05854) 15   Symptoms Noted During/After Treatment (Meal Preparation/Planning Training) fatigue;shortness of breath   Treatment Detail/Skilled Intervention Pt at EOB at beginning of session and agreeable to OT. STS with Mod I  from bed and toilet after cues for tech with FWW and rail near toilet to simulate home set-up. Educ in PLB tech with handout and pt demo'd understanding after educ. Pt walked into BR and toileting with Mod I and cues for safe hand placement. Walked to sink and stood to perform g/h tasks with Mod I. Pt instructed in PLB tech for incr activity homa and educ in taking breaks before getting tired and incr activity at home as able. Bed mob from EOB to reclining with SBA. All needs in reach and alarms on.   OT Discharge Planning   OT Plan any FWW safety with func mob-abigail with O2 line;PLB with ADL   OT Discharge Recommendation (DC Rec) (S)  home with home care occupational therapy   OT Rationale for DC Rec Ensure safety with new O2 at home for ADL.   OT Brief overview of current status SBA-Mod I for ADL   OT Total Distance Amb During Session (feet) 30   OT Equipment Needed at Discharge   (none)   Total Session Time   Timed Code Treatment Minutes 15   Total Session Time (sum of timed and untimed services) 25

## 2025-04-23 NOTE — PROGRESS NOTES
"CLINICAL NUTRITION SERVICES - ASSESSMENT NOTE    RECOMMENDATIONS FOR MDs/PROVIDERS TO ORDER:  ***    Registered Dietitian Interventions:  ***    Future/Additional Recommendations:  ***     REASON FOR ASSESSMENT  Positive admission nutrition risk screen: 24-33 lb wt loss    INFORMATION OBTAINED  {RDNSubjectiveinformation:949291}    NUTRITION HISTORY  Dx: Hypoxic resp failure, COPD exacerbation, pneumonia, abdominal pain  PMH:  COPD, hypertension, hyperlipidemia, and history of diverticulitis with perforation       CURRENT NUTRITION ORDERS  Diet: {RDNDietorder:345892}  Snacks/Supplements: {KK Oral Supp:068646}      CURRENT INTAKE/TOLERANCE  ***    LABS  Nutrition-relevant labs: {RDNNewfindingsrelevantlabsmeds:214074}    MEDICATIONS  Nutrition-relevant medications: {RDNNewfindingsrelevantlabsmeds:363119}    ANTHROPOMETRICS  Height: 157.5 cm (5' 2\")  Admission Weight: 68 kg (150 lb) (04/22/25 0455)   Most Recent Weight: 68 kg (150 lb) (04/22/25 0455)  IBW: *** kg  BMI: Body mass index is 27.44 kg/m .   Weight History: ***  Dosing Weight: *** kg, based on {RDNDosingweightjustification:369625}    ASSESSED NUTRITION NEEDS  Estimated Energy Needs: *** kcals/day ({RDNEstimatedenergyneeds:168150})  Justification: {RDNEstimatedenergyneedsjustification:349369}  Estimated Protein Needs: *** grams protein/day ({RDNEstimatedproteinneeds:923213})  Justification: {RDNEstimatedproteinneedsjustification:156223}  Estimated Fluid Needs: *** mL/day ({RDNEstimatedfluidneeds:016967})  Justification: {RDNEstimatedfluidneedsjustification:310694}    SYSTEM AND PHYSICAL FINDINGS    {RDNPhysicalfindings:493913}  GI symptoms: {RDNSystemandphysicalfindingsskinwoundsgisymptoms:381685}  Skin/wounds: {RDNSystemandphysicalfindingsskinwoundsgisymptoms:724316}    MALNUTRITION  % Intake: {RDNMalnutrition%intake:923554}  % Weight Loss: {RDNMalnutrition%weightloss:955401}  Subcutaneous Fat Loss: {RDNMalnutritionsubcutaneousfatloss:158284}  Muscle Loss: " {RDNMalnutritionmuscleloss:707605}  Fluid Accumulation/Edema: {RDNMalnutritionfluidaccumulationedema:277790}  Malnutrition Diagnosis: {RDNMalnutritiondiagnosis:126487}  Malnutrition Present on Admission: {RDNMalnutritionpresentonadmit:057321}    NUTRITION DIAGNOSIS  {RDNNutritiondiagnosis:011864} related to *** as evidenced by ***    INTERVENTIONS  {RDNInterventions:469733}    GOALS  {RDNGoals:937642}     MONITORING/EVALUATION  Progress toward goals will be monitored and evaluated per policy.     gradual onset

## 2025-04-23 NOTE — PROGRESS NOTES
"   04/23/25 8626   Appointment Info   Signing Clinician's Name / Credentials (PT) Daija Thomas, PT, DPT   Living Environment   People in Home child(hector), adult   Current Living Arrangements house   Home Accessibility stairs to enter home   Number of Stairs, Main Entrance 5   Stair Railings, Main Entrance railing on left side (ascending)   Transportation Anticipated family or friend will provide   Living Environment Comments lives with son who works the night shift,   Self-Care   Usual Activity Tolerance moderate   Current Activity Tolerance fair   Equipment Currently Used at Home none   Fall history within last six months no   Activity/Exercise/Self-Care Comment pt reports drives, IND with mobility without AD, reports has a walk in shower, shower chair-reports has not been using, pt reports also has a FWW. reports does not typically use oxygen   General Information   Onset of Illness/Injury or Date of Surgery 04/22/25   Referring Physician Dinesh Sánchez MD   Patient/Family Therapy Goals Statement (PT) to return home   Pertinent History of Current Problem (include personal factors and/or comorbidities that impact the POC) 73 y/o F admitted with c/o abdominal pain-reports just finished abx for diverticulitis, found to have with acute hypoxic respiratory failure, COPD exacerbation, pneumonia. PMHx: hypertension, hyperlipidemia, and history of diverticulitis with perforation   Existing Precautions/Restrictions fall   General Observations resting bed, NAD, 3L NC SPO2 at rest: 95%, HR: 99bpm   Cognition   Affect/Mental Status (Cognition) WFL   Orientation Status (Cognition) oriented to;person;place;situation   Pain Assessment   Patient Currently in Pain Yes, see Vital Sign flowsheet  (reports pain \"under breasts\"-pt feels its from pneumonia, reports 5/10)   Integumentary/Edema   Integumentary/Edema no deficits were identifed   Posture    Posture Forward head position   Range of Motion (ROM)   Range of Motion ROM is " WFL   Strength (Manual Muscle Testing)   Strength Comments generalized weakness, able to lift ext x 4 against gravity   Bed Mobility   Comment, (Bed Mobility) mod IND supine to sit with railing and elevated HOB, SPO2: 92% post sitting EOB on 3 L NC   Transfers   Comment, (Transfers) min A sit to stand without AD   Gait/Stairs (Locomotion)   [place] Level (Gait) minimum assist (75% patient effort)   Assistive Device (Gait)   (none)   Distance in Feet (Gait) 10ft   Comment, (Gait/Stairs) very guarded, hands in semi guarded postion, short steps, flat foot contact, unsteady, reaching for external support. SPO2 post amb: 89%, RR: 36 resp/min, pt able to recover >90 within 1 min rest, deferred stairs today due to SHEPHERD with short distance amb   Balance   Balance Comments able to stand 30 sec WBOS-steady, unseady with any dynamic standing-min A   Sensory Examination   Sensory Perception patient reports no sensory changes   Clinical Impression   Criteria for Skilled Therapeutic Intervention Yes, treatment indicated   PT Diagnosis (PT) impaired gait   Influenced by the following impairments impaired activity tolerance, impaired balance, generalized weakness   Functional limitations due to impairments impaired IND with functional mobility without AD   Clinical Presentation (PT Evaluation Complexity) stable   Clinical Presentation Rationale clinical judgement   Clinical Decision Making (Complexity) low complexity   Planned Therapy Interventions (PT) balance training;gait training;bed mobility training;home exercise program;patient/family education;stair training;strengthening;transfer training;progressive activity/exercise;risk factor education;home program guidelines   Risk & Benefits of therapy have been explained evaluation/treatment results reviewed;care plan/treatment goals reviewed;participants voiced agreement with care plan;participants included;patient   PT Total Evaluation Time   PT Eval, Low Complexity Minutes  (85402) 10   Physical Therapy Goals   PT Frequency Daily   PT Predicted Duration/Target Date for Goal Attainment 04/30/25   PT Goals Transfers;Gait;Stairs   PT: Transfers Modified independent;Sit to/from stand;Assistive device   PT: Gait Modified independent;Rolling walker;150 feet   PT: Stairs 4 stairs;Minimal assist;Rail on left   Interventions   Interventions Quick Adds Gait Training;Therapeutic Activity   Therapeutic Activity   Therapeutic Activities: dynamic activities to improve functional performance Minutes (84903) 9   Symptoms Noted During/After Treatment None   Treatment Detail/Skilled Intervention cues for safety with sit to stand with FWW with hand placement-SBA. reviewed PLB techniques for resp recovery, educated on sitting for tasks. educated on normal ex response with activity, pacing. discussed home PT recs for d/c. Pt left sitting EOB, OT present to work with pt   Gait Training   Gait Training Minutes (84722) 10   Symptoms Noted During/After Treatment (Gait Training) fatigue;shortness of breath   Treatment Detail/Skilled Intervention SBA with FWW with amb in the room, no LOB with multiple turns. 91% post amb on 3L, HR: 104 bpm, RR: 32 resp/min. Discussed strong recommenation for FWW use at d/c for balance and energy conservation-pt voiced agreement.   Distance in Feet 65ft   PT Discharge Planning   PT Plan progress amb with FWW-monitor O2, stairs   PT Discharge Recommendation (DC Rec) home with assist;home with home care physical therapy   PT Rationale for DC Rec Pt reports good family support-lives with adult son. Anticipate pt will be able to return home once medically stable. Recommend FWW (pt reports has one at home) use upon return home and home PT services-pt voiced agreement.   PT Brief overview of current status SBA with FWW use, SHEPHERD with short distance amb, RR: 32, SPO2: 91% on 3 L   PT Total Distance Amb During Session (feet) 75   Physical Therapy Time and Intention   Timed Code Treatment  Minutes 19   Total Session Time (sum of timed and untimed services) 29

## 2025-04-23 NOTE — PROGRESS NOTES
RT Note:    Scheduled neb given per order. Pt seen on 3L NC with diminished breath sounds. Will continue to monitor and assess pt.

## 2025-04-23 NOTE — PROGRESS NOTES
"GI Progress Note  Alexandra Mckeon  [id number]     Subjective:   Daughter visiting  Patient states her abdominal pain is much better now.  Still complains of pain underneath her breasts -worse with deep breathing.  She only had a small bowel movement yesterday, none since.  We again discussed plan for colonoscopy in 2 months and she agrees, acknowledging bowel prep is needed.  She is on 3 L of oxygen via NC.    Objective:   BP (!) 142/65 (BP Location: Left arm)   Pulse 97   Temp 97.9  F (36.6  C) (Oral)   Resp 19   Ht 1.575 m (5' 2\")   Wt 68 kg (150 lb)   SpO2 92%   BMI 27.44 kg/m    Body mass index is 27.44 kg/m .   Gen: No acute distress  Cardio: RRR  GI: Non-distended, BS positive, soft, non-tender. No guarding.    Laboratory  Recent Labs   Lab 04/23/25  0858 04/22/25  0500   WBC 6.6 8.3   RBC 4.38 4.22   HGB 12.6 12.2   HCT 37.6 36.0   MCV 86 85   MCH 28.8 28.9   MCHC 33.5 33.9   RDW 13.2 13.4    310      Recent Labs   Lab 04/23/25  0858 04/22/25  0500    133*   CO2 26 23   BUN 15.3 17.2     Recent Labs   Lab 04/22/25  0500   ALKPHOS 89   AST 18   ALT 8     Lab Results   Component Value Date    INR 1.21 (H) 11/17/2024       CT Abdomen Pelvis w Contrast    Result Date: 4/22/2025  EXAM: CT ABDOMEN PELVIS W CONTRAST LOCATION: [facility] DATE: 4/22/2025 INDICATION: abd pain COMPARISON: 11/18/2024 TECHNIQUE: CT scan of the abdomen and pelvis was performed following injection of IV contrast. Multiplanar reformats were obtained. Dose reduction techniques were used. CONTRAST: 90 cc Isovue-370 FINDINGS: LOWER CHEST: See separate chest CT report for those details. HEPATOBILIARY: Normal. PANCREAS: Normal. SPLEEN: Normal. ADRENAL GLANDS: Normal. KIDNEYS/BLADDER: Small benign renal cysts with no follow-up needed. 3 mm nonobstructing stone left kidney. No hydronephrosis. No bladder wall thickening. Small obturator hernia in the left containing a small portion of the bladder. BOWEL: " Large hiatal hernia. Small bowel is normal in caliber. Negative for appendicitis. Colon is partially fluid-filled suggestive of a nonspecific diarrheal state. Sigmoid diverticulosis. There is some very minimal stranding and edema in the left lower  quadrant adjacent to the sigmoid colon with a mild or low-grade diverticulitis not excluded. LYMPH NODES: Normal. VASCULATURE: Aortic calcification. Mild ectasia of the abdominal aorta measuring 2.7 cm, unchanged. PELVIC ORGANS: Hysterectomy. MUSCULOSKELETAL: Mild/moderate degenerative changes in the spine.     IMPRESSION: 1.  Colon is partially fluid-filled suggestive of a nonspecific diarrheal state. 2.  Sigmoid diverticulosis. There is some very minimal stranding seen in the left lower quadrant adjacent to the sigmoid colon. Cannot completely exclude a mild or low-grade diverticulitis and clinical correlation needed. No free air or fluid collections. 3.  3 mm nonobstructing stone left kidney. 4.  Small left obturator hernia containing a small portion of the bladder. 5.  Large hiatal hernia. 6.  Hysterectomy. 7.  See separate chest CT report for those details.         CT Chest Pulmonary Embolism w Contrast    Result Date: 4/22/2025  EXAM: CT CHEST PULMONARY EMBOLISM W CONTRAST LOCATION: [facility] DATE: 4/22/2025 INDICATION: R pleuritic chest pain COMPARISON: None. TECHNIQUE: CT chest pulmonary angiogram during arterial phase injection of IV contrast. Multiplanar reformats and MIP reconstructions were performed. Dose reduction techniques were used. CONTRAST: 90 cc Isovue-370 FINDINGS: ANGIOGRAM CHEST: Pulmonary arteries are normal caliber and negative for pulmonary emboli. Thoracic aorta is negative for dissection. No CT evidence of right heart strain. LUNGS AND PLEURA: Moderate emphysematous changes in the lungs. Subpleural consolidation/infiltrate in both lower lobes, left greater than right. Findings suggest bilateral pneumonia. Generalized  bronchial wall thickening in the lower lungs suggests bronchiolitis. Minimal bilateral pleural effusions. MEDIASTINUM/AXILLAE: Mild hilar and mediastinal adenopathy favored to be reactive in nature. Normal heart size. No pericardial effusion. Normal caliber thoracic aorta. Large hiatal hernia. CORONARY ARTERY CALCIFICATION: Moderate to severe. UPPER ABDOMEN: See separate abdomen and pelvis CT report for those details. MUSCULOSKELETAL: Mild degenerative changes in the spine.     IMPRESSION: 1.  Negative for pulmonary embolism. 2.  Emphysematous changes in the lungs. 3.  Prominent consolidation/infiltrate in both lower lobes left greater than right suspicious for bilateral pneumonia. 4.  Generalized bronchial wall thickening suggests bronchiolitis. 5.  Minimal bilateral pleural effusions. 6.  Mild hilar and mediastinal adenopathy favored to be reactive in nature. Recommend follow-up chest CT in 2-3 months following appropriate therapy to monitor for resolution of these findings. 7.  Large hiatal hernia.            Assessment:   This is a 74 year old female with a history of COPD, diverticulitis with perforation and resultant sepsis November 2024.  Brought in for worsening shortness of breath and abdominal pain over the past week.     1. Abd pain, multifocal.  Resolved.   Presented with pain in multiple areas of her abdomen, most bothersome was is the area she described as upper abdomen though appears pain was actually pleuritic.   Reports pain in lower abdomen is virtually resolved.  LFTs, lipase, CT do not point towards pancreas or biliary etiology.      2.  History of diverticulitis  Perforated diverticulitis November 2024.  She has not had follow-up colonoscopy thereafter.  We talked about the importance of doing this and she agrees - daughter is supportive.  Current CT does not show any overwhelming evidence of diverticulitis; however, notes some mild stranding adjacent to the sigmoid colon that could potentially  "represent some mild diverticulitis.  Was placed on antibiotics for possible diverticulitis when seen at her entire clinic last week; reasonable to finish the course of antibiotics.     3.  Large hiatal hernia  4.  Reflux symptoms  Found on CT.  Reports heartburn her \"whole life\".  On famotidine at home.  Will place on PPI here.  Given the duration of symptoms and the large hiatal hernia, can do outpatient EGD at time of outpatient colonoscopy    Patient Active Problem List   Diagnosis    Diverticulitis of colon with perforation    Acute sepsis (H)    Chronic obstructive pulmonary disease (H)    Essential hypertension    Hyperlipidemia    Stage 3 chronic kidney disease (H)    Acute hypoxemic respiratory failure (H)    Pneumonia of both lower lobes due to infectious organism     Plan:   1.  Antibiotics per ID.  Input appreciated.  On Levaquin and Flagyl.  2.  Protonix 40 mg daily.  Administer now, then take 30 minutes before breakfast daily.  3.  Low fiber diet for the next 2-3 weeks, then gradually increase thereafter.  4.  Outpatient colonoscopy in approximately 2 months.  5.  EGD to be done at time of outpatient colonoscopy for further evaluation of chronic reflux symptoms and large hiatal hernia.  -- [facility] will contact to arrange procedures.     Nothing further from GI.  We will not continue to follow but can be contacted with concerns or questions that arise prior to discharge.  Thank you for the opportunity to be involved in this patient's care.  Cj Turcios PA-C  [facility]  [phone]     A total of 25 minutes was spent at today's care.  "

## 2025-04-23 NOTE — PROGRESS NOTES
"[facility]    Medicine Progress Note - Hospitalist Service    Date of Admission:  4/22/2025    Assessment & Plan   Alexandra Mckeon is a 74 year old female admitted with acute hypoxic respiratory failure due to community acquired pneumonia and COPD exacerbation.  She is hemodynamically stable with a grossly stable supplemental oxygen requirement.  Continue empiric abx and steroids.  Metronidazole and levofloxacin do not need to be administered intravenously unless the patient is strict NPO.  They both have excellent oral bioavailability.    Plan to complete course of antibiotics for possible diverticulitis.  Patient to have outpatient GI followup.    # Acute hypoxic respiratory failure due to community acquired pneumonia and COPD exacerbation  - empiric abx  - hold trelegy given use of scheduled duonebs and prednisone    # Diverticulitis  - to complete course of antibiotics  - GI consulted    # HTN  - losartan    # HLD          Diet: Combination Diet Regular Diet Adult      Ugalde Catheter: Not present  Lines: None     Cardiac Monitoring: None  Code Status: Full Code      Clinically Significant Risk Factors         # Hyponatremia: Lowest Na = 133 mmol/L in last 2 days, will monitor as appropriate  # Hypochloremia: Lowest Cl = 97 mmol/L in last 2 days, will monitor as appropriate          # Hypertension: Noted on problem list            # Overweight: Estimated body mass index is 27.44 kg/m  as calculated from the following:    Height as of this encounter: 1.575 m (5' 2\").    Weight as of this encounter: 68 kg (150 lb)., PRESENT ON ADMISSION     # Financial/Environmental Concerns:           Social Drivers of Health    Tobacco Use: Medium Risk (12/3/2024)    Patient History     Smoking Tobacco Use: Former     Smokeless Tobacco Use: Unknown          Disposition Plan     Medically Ready for Discharge: Anticipated in 2-4 Days             Dinesh áSnchez MD  Hospitalist Service  [facility] " [facility]  Securely message with Shar[employer] (more info)  Text page via [employer] Paging/Directory   ______________________________________________________________________    Interval History   Reports dyspnea with exertion.  Reports pain under her breasts, which is improved. She has tolerated a diet.  Denies abdominal pain.    Physical Exam   Vital Signs: Temp: 97.9  F (36.6  C) Temp src: Oral BP: (!) 142/65 (nurse notified) Pulse: 97   Resp: 19 SpO2: 94 % O2 Device: Nasal cannula Oxygen Delivery: 3 LPM  Weight: 150 lbs 0 oz    Gen:  sitting in bed in no extremis  Neuro:  alert, conversant  CV:  borderline tachycardia, regular rhythm  Pulm:  no acute resp distress, crackles at the left base  GI:  abdomen NTTP    Medical Decision Making             Data   Reviewed:    Na 137  K 4.5  BUN 15  Cr 0.87    WBC 6.6  Hgb 13  Plts 321

## 2025-04-24 ENCOUNTER — APPOINTMENT (OUTPATIENT)
Dept: PHYSICAL THERAPY | Facility: CLINIC | Age: 75
DRG: 193 | End: 2025-04-24
Payer: COMMERCIAL

## 2025-04-24 ENCOUNTER — APPOINTMENT (OUTPATIENT)
Dept: OCCUPATIONAL THERAPY | Facility: CLINIC | Age: 75
DRG: 193 | End: 2025-04-24
Payer: COMMERCIAL

## 2025-04-24 LAB
EST. AVERAGE GLUCOSE BLD GHB EST-MCNC: 114 MG/DL
HBA1C MFR BLD: 5.6 %

## 2025-04-24 PROCEDURE — 250N000013 HC RX MED GY IP 250 OP 250 PS 637: Performed by: HOSPITALIST

## 2025-04-24 PROCEDURE — 250N000009 HC RX 250: Performed by: STUDENT IN AN ORGANIZED HEALTH CARE EDUCATION/TRAINING PROGRAM

## 2025-04-24 PROCEDURE — 99232 SBSQ HOSP IP/OBS MODERATE 35: CPT | Performed by: STUDENT IN AN ORGANIZED HEALTH CARE EDUCATION/TRAINING PROGRAM

## 2025-04-24 PROCEDURE — 97530 THERAPEUTIC ACTIVITIES: CPT | Mod: GP

## 2025-04-24 PROCEDURE — 99238 HOSP IP/OBS DSCHRG MGMT 30/<: CPT | Performed by: HOSPITALIST

## 2025-04-24 PROCEDURE — 250N000013 HC RX MED GY IP 250 OP 250 PS 637: Performed by: STUDENT IN AN ORGANIZED HEALTH CARE EDUCATION/TRAINING PROGRAM

## 2025-04-24 PROCEDURE — 999N000157 HC STATISTIC RCP TIME EA 10 MIN

## 2025-04-24 PROCEDURE — 250N000013 HC RX MED GY IP 250 OP 250 PS 637: Performed by: PHYSICIAN ASSISTANT

## 2025-04-24 PROCEDURE — 94640 AIRWAY INHALATION TREATMENT: CPT

## 2025-04-24 PROCEDURE — 94640 AIRWAY INHALATION TREATMENT: CPT | Mod: 76

## 2025-04-24 PROCEDURE — 97110 THERAPEUTIC EXERCISES: CPT | Mod: GP

## 2025-04-24 PROCEDURE — 97116 GAIT TRAINING THERAPY: CPT | Mod: GP

## 2025-04-24 PROCEDURE — 97535 SELF CARE MNGMENT TRAINING: CPT | Mod: GO

## 2025-04-24 PROCEDURE — 120N000001 HC R&B MED SURG/OB

## 2025-04-24 PROCEDURE — 250N000012 HC RX MED GY IP 250 OP 636 PS 637: Performed by: STUDENT IN AN ORGANIZED HEALTH CARE EDUCATION/TRAINING PROGRAM

## 2025-04-24 PROCEDURE — 250N000011 HC RX IP 250 OP 636: Performed by: STUDENT IN AN ORGANIZED HEALTH CARE EDUCATION/TRAINING PROGRAM

## 2025-04-24 RX ORDER — PREDNISONE 20 MG/1
40 TABLET ORAL DAILY
Qty: 4 TABLET | Refills: 0 | Status: SHIPPED | OUTPATIENT
Start: 2025-04-25 | End: 2025-04-25

## 2025-04-24 RX ORDER — LEVOFLOXACIN 750 MG/1
750 TABLET, FILM COATED ORAL DAILY
Qty: 2 TABLET | Refills: 0 | Status: SHIPPED | OUTPATIENT
Start: 2025-04-25 | End: 2025-04-25

## 2025-04-24 RX ORDER — METRONIDAZOLE 500 MG/1
500 TABLET ORAL 3 TIMES DAILY
Qty: 6 TABLET | Refills: 0 | Status: SHIPPED | OUTPATIENT
Start: 2025-04-24 | End: 2025-04-25

## 2025-04-24 RX ADMIN — ENOXAPARIN SODIUM 40 MG: 40 INJECTION SUBCUTANEOUS at 09:38

## 2025-04-24 RX ADMIN — LOSARTAN POTASSIUM 25 MG: 25 TABLET, FILM COATED ORAL at 09:40

## 2025-04-24 RX ADMIN — PREDNISONE 40 MG: 20 TABLET ORAL at 09:41

## 2025-04-24 RX ADMIN — METRONIDAZOLE 500 MG: 500 TABLET ORAL at 09:39

## 2025-04-24 RX ADMIN — LEVOFLOXACIN 750 MG: 750 TABLET, FILM COATED ORAL at 09:38

## 2025-04-24 RX ADMIN — ACETAMINOPHEN 650 MG: 325 TABLET, FILM COATED ORAL at 01:09

## 2025-04-24 RX ADMIN — OXYCODONE HYDROCHLORIDE 2.5 MG: 5 TABLET ORAL at 01:09

## 2025-04-24 RX ADMIN — SIMVASTATIN 20 MG: 20 TABLET, FILM COATED ORAL at 20:14

## 2025-04-24 RX ADMIN — IPRATROPIUM BROMIDE AND ALBUTEROL SULFATE 3 ML: .5; 3 SOLUTION RESPIRATORY (INHALATION) at 13:08

## 2025-04-24 RX ADMIN — METRONIDAZOLE 500 MG: 500 TABLET ORAL at 20:14

## 2025-04-24 RX ADMIN — IPRATROPIUM BROMIDE AND ALBUTEROL SULFATE 3 ML: .5; 3 SOLUTION RESPIRATORY (INHALATION) at 21:16

## 2025-04-24 RX ADMIN — IPRATROPIUM BROMIDE AND ALBUTEROL SULFATE 3 ML: .5; 3 SOLUTION RESPIRATORY (INHALATION) at 08:02

## 2025-04-24 RX ADMIN — PANTOPRAZOLE SODIUM 40 MG: 40 TABLET, DELAYED RELEASE ORAL at 09:40

## 2025-04-24 RX ADMIN — METRONIDAZOLE 500 MG: 500 TABLET ORAL at 16:49

## 2025-04-24 RX ADMIN — IPRATROPIUM BROMIDE AND ALBUTEROL SULFATE 3 ML: .5; 3 SOLUTION RESPIRATORY (INHALATION) at 16:12

## 2025-04-24 ASSESSMENT — ACTIVITIES OF DAILY LIVING (ADL)
ADLS_ACUITY_SCORE: 40
ADLS_ACUITY_SCORE: 38
ADLS_ACUITY_SCORE: 40
DEPENDENT_IADLS:: INDEPENDENT
ADLS_ACUITY_SCORE: 40

## 2025-04-24 NOTE — PLAN OF CARE
Goal Outcome Evaluation:      Plan of Care Reviewed With: patient          Outcome Evaluation: Anticipate DC home with home care

## 2025-04-24 NOTE — PROGRESS NOTES
"[facility]    Medicine Progress Note - Hospitalist Service    Date of Admission:  4/22/2025    Assessment & Plan   Alexandra Mckeon is a 74 year old female admitted with acute hypoxic respiratory failure due to community acquired pneumonia and COPD exacerbation.  Presentation complicated by diverticulitis.  She is hemodynamically stable, now on room air.  She is tolerating a diet.  Check ambulatory oximetry to determine ability for discharge today.    # Acute hypoxic respiratory failure due to community acquired pneumonia and COPD exacerbation  - empiric abx  - hold trelegy given use of scheduled duonebs    # Diverticulitis  - to complete course of antibiotics  - GI consulted    # HTN  - losartan    # HLD          Diet: Combination Diet Regular Diet Adult      Ugalde Catheter: Not present  Lines: None     Cardiac Monitoring: None  Code Status: Full Code      Clinically Significant Risk Factors                   # Hypertension: Noted on problem list            # Overweight: Estimated body mass index is 27.44 kg/m  as calculated from the following:    Height as of this encounter: 1.575 m (5' 2\").    Weight as of this encounter: 68 kg (150 lb)., PRESENT ON ADMISSION     # Financial/Environmental Concerns:           Social Drivers of Health    Tobacco Use: Medium Risk (12/3/2024)    Patient History     Smoking Tobacco Use: Former     Smokeless Tobacco Use: Unknown          Disposition Plan     Medically Ready for Discharge: Anticipated Today             Dinesh Sánchez MD  Hospitalist Service  [facility]  Securely message with [employer] (more info)  Text page via [email or web address] Paging/Directory   ______________________________________________________________________    Interval History   Denies dyspnea, chest pain, chest pressure, abdominal pain, or nausea.    Physical Exam   Vital Signs: Temp: 98  F (36.7  C) Temp src: Oral BP: 133/67 Pulse: 84   Resp: 16 SpO2: 94 % O2 Device: None " (Room air) Oxygen Delivery: 2 LPM  Weight: 150 lbs 0 oz    Spo2 88-90% on room air    Gen:  sitting in chair in no extremis, overall well appearing  Neuro: alert, conversant  CV:  nl rate, regular rhythm  Pulm:  no acute resp distress, crackles at the left base  GI:  abdomen NTTP    Medical Decision Making             Data

## 2025-04-24 NOTE — PLAN OF CARE
VSS on 2 L. Given Tylenol and Oxycodone for headache, abdomnal and chest pain (related to coughing and pneumonia). Diminished and wheezing lung sounds. Dyspnea on exertion. Up with A1 FWW and GB to bathroom.       Problem: Adult Inpatient Plan of Care  Goal: Optimal Comfort and Wellbeing  Outcome: Progressing  Intervention: Provide Person-Centered Care  Recent Flowsheet Documentation  Taken 4/24/2025 0109 by Erica Escalante, RN  Trust Relationship/Rapport:   care explained   choices provided   emotional support provided   empathic listening provided   questions answered   questions encouraged   reassurance provided   thoughts/feelings acknowledged  Taken 4/23/2025 2139 by Erica Escalante, RN  Trust Relationship/Rapport:   care explained   choices provided   emotional support provided   empathic listening provided   questions answered   questions encouraged   reassurance provided   thoughts/feelings acknowledged     Problem: Adult Inpatient Plan of Care  Goal: Readiness for Transition of Care  Outcome: Progressing     Problem: Gas Exchange Impaired  Goal: Optimal Gas Exchange  Outcome: Progressing

## 2025-04-24 NOTE — CARE PLAN
04/24/25 1130   Home Oxygen Assessment (RN/RT ONLY)   Does patient have oxygen at home? No   1. SpO2 on room air at rest while awake 85       Oxygen LPM at rest 2   3. SpO2 on room air during Activity/with exercise 82   4. SpO2 with oxygen during activity/with exercise 88       Oxygen LPM during activity/with exercise 2   Does patient qualify for Home O2? Yes

## 2025-04-24 NOTE — DISCHARGE INSTRUCTIONS
Home care services have been arrange for you    Home Care Agency: [facility].    Home Care Phone: [phone]    Services: PT/OT    Instructions: Home care will visit within 48 hrs after discharge.  Provider will call you to schedule visit.

## 2025-04-24 NOTE — PROGRESS NOTES
"[facility] Inpatient follow up       Patient:  Alexandra Mckeon  Date of birth 1950, Medical record number [id number]  Date of Visit:  2025  Attending Physician: Dinesh Sánchez MD         Assessment and Recommendations:   Assessment:  Alexandra Mckeon is a 74 year old female with   COPD  History of acute diverticulitis with perforation in 2024.  Was treated with IV antibiotic and discharged on on 2024 on Cipro plus Flagyl for 11 days  Possible recent diverticulitis.  Acute hypoxic respiratory failure requiring supplemental oxygen secondary to pneumonia.  Appears to have some COPD exacerbation associated with this.  On Levaquin plus Flagyl.  Also on steroid and bronchodilators.  Continues to improve.  Oxygen needs decreasing.  Feels ready to go home.    Recommendations:  Continue on Levaquin and Flagyl for a total of 5 days from 2025.  Okay to discharge from the ID standpoint.    Discussed with the patient, nursing staff.    ID will sign off.    Yuval Almonte MD.  [facility].   [facility]  Office Telephone [phone].  Fax [phone]  [facility] paging            Interval History:     HPI:  The interval history was reviewed.   Doing much better today.  Oxygen need coming down.  Chest pain under much better control.    Pertinent cultures include:  No results found for: \"CULT\"    Recent Inflammatory Biomarkers:   Recent Labs   Lab Test 25  0858 25  0500 24  0700 24  0729 24  0630 24  2357   PCAL  --   --   --   --   --  0.41   WBC 6.6 8.3 7.4 12.3* 13.5* 19.6*            Review of Systems:   CONSTITUTIONAL:    Temp Max: Temp (24hrs), Av.3  F (36.8  C), Min:97.9  F (36.6  C), Max:98.7  F (37.1  C)   .  Negative except for findings in the HPI.           Current Medications (antimicrobials listed in bold):     Current Facility-Administered Medications   Medication " Dose Route Frequency Provider Last Rate Last Admin    enoxaparin ANTICOAGULANT (LOVENOX) injection 40 mg  40 mg Subcutaneous Q24H Dangelo Bee MD   40 mg at 04/23/25 0833    ipratropium - albuterol 0.5 mg/2.5 mg/3 mL (DUONEB) neb solution 3 mL  3 mL Nebulization 4x daily Dangelo Bee MD   3 mL at 04/24/25 0802    levofloxacin (LEVAQUIN) tablet 750 mg  750 mg Oral Daily Dinesh Sánchez MD        losartan (COZAAR) tablet 25 mg  25 mg Oral Daily Dangelo Bee MD   25 mg at 04/23/25 0833    metroNIDAZOLE (FLAGYL) tablet 500 mg  500 mg Oral TID Dinesh Sánchez MD   500 mg at 04/23/25 2139    pantoprazole (PROTONIX) EC tablet 40 mg  40 mg Oral QAM AC Cj Turcios PA-C   40 mg at 04/23/25 1249    predniSONE (DELTASONE) tablet 40 mg  40 mg Oral Daily Dangelo Bee MD   40 mg at 04/23/25 0832    simvastatin (ZOCOR) tablet 20 mg  20 mg Oral At Bedtime Dangelo Bee MD   20 mg at 04/23/25 2139    sodium chloride (PF) 0.9% PF flush 3 mL  3 mL Intracatheter Q8H DARIAN Dangelo Bee MD   3 mL at 04/24/25 0110              Allergies:     Allergies   Allergen Reactions    Aspirin Hives    Cephalexin Hives    Ibuprofen Hives    Penicillin V Hives    Penicillins Hives    Potassium Hives            Physical Exam:   Vitals were reviewed  Patient Vitals for the past 24 hrs:   BP Temp Temp src Pulse Resp SpO2   04/24/25 0802 -- -- -- -- -- 93 %   04/24/25 0738 133/67 98  F (36.7  C) Oral 84 16 94 %   04/23/25 2345 119/67 98.1  F (36.7  C) Oral 99 16 (!) 91 %   04/23/25 1927 -- -- -- -- -- 94 %   04/23/25 1616 -- -- -- -- -- 95 %   04/23/25 1605 122/58 98.8  F (37.1  C) Oral 90 18 96 %   04/23/25 1225 -- -- -- -- -- 94 %       Physical Examination:  Gen: Pleasant in no acute distress.  HEENT: NCAT. EOMI. PERRL.  Neck: No bruit, JVD or thyromegaly.  Lungs: Crackles at the bases bilaterally.  Improved wheezes.  Card: RRR. NSR. No RMG. Peripheral pulses present and symmetric. No edema.  Abd: Soft NT ND. No mass. Normal bowel  sounds.  Skin: No rash.  Extr: No edema.  Neuro: Alert and oriented to place time and person. Cranial nerves II to XII intact.         Laboratory Data:   ID Labs:  Microbiology labs:  Reviewed.    No lab results found.  Recent Labs   Lab Test 04/23/25  0858 04/22/25  0500 11/25/24  0700 11/20/24  0729 11/19/24  0630 11/17/24  2357   WBC 6.6 8.3 7.4 12.3* 13.5* 19.6*     Recent Labs   Lab Test 04/23/25  0858 04/22/25  0500 12/12/24  1446 11/25/24  0700   CR 0.87 0.88 0.95 0.89   GFRESTIMATED 69 68 62 67       Hematology Studies  Recent Labs   Lab Test 04/23/25  0858 04/22/25  0500 11/25/24  0700 11/20/24  0729 11/19/24  0630 11/17/24  2357   WBC 6.6 8.3 7.4 12.3* 13.5* 19.6*   ANEU  --  5.2 4.5  --   --  16.8*   AEOS  --  0.8* 0.4  --   --  0.0   HGB 12.6 12.2 12.3 10.8* 10.9* 13.3   HCT 37.6 36.0 38.7 32.7* 33.4* 39.3    310 323 194 170 207       Metabolic  Recent Labs   Lab Test 04/23/25  0858 04/22/25  0500 12/12/24  1446    133* 138   BUN 15.3 17.2 13.4   CO2 26 23 25   CR 0.87 0.88 0.95   GFRESTIMATED 69 68 62       Hepatic Studies  Recent Labs   Lab Test 04/22/25  0500 11/17/24  2357 02/01/23  1335   BILITOTAL 0.3 1.0 0.3   ALKPHOS 89 86 101   ALBUMIN 3.8 4.2 4.4   AST 18 24 21   ALT 8 16 17       ImmunologlobulinsNo lab results found.         Imaging Data:   Reviewed

## 2025-04-24 NOTE — DISCHARGE SUMMARY
"[facility]  Hospitalist Discharge Summary      Date of Admission:  4/22/2025  Date of Discharge:  4/24/2025  Discharging Provider: Dinesh Sánchez MD  Discharge Service: Hospitalist Service    Discharge Diagnoses   Acute hypoxic respiratory failure due to community acquired pneumonia and COPD exacerbation  Diverticulitis    Clinically Significant Risk Factors     # Overweight: Estimated body mass index is 27.44 kg/m  as calculated from the following:    Height as of this encounter: 1.575 m (5' 2\").    Weight as of this encounter: 68 kg (150 lb).       Follow-ups Needed After Discharge   Follow-up Appointments       Hospital Follow-up with Existing Primary Care Provider (PCP)          Schedule Primary Care visit within: 7 Days   Recommended labs and Imaging (to be ordered by Primary Care Provider): followup hilar/mediastinal adenopathy in setting of infection and consider repeat imaging to assess for resolution; ensure outpatient GI followup for EGD and colonoscopy               Unresulted Labs Ordered in the Past 30 Days of this Admission       Date and Time Order Name Status Description    4/24/2025  8:32 AM Hemoglobin A1c In process         These results will be followed up by PCP    Discharge Disposition   Discharged to home  Condition at discharge: Stable    Hospital Course   The patient was admitted with acute hypoxic respiratory failure due to a COPD exacerbation and community acquired pneumonia.  She was treated with antibiotics and steroids.  Presentation was complicated by diverticulitis.  GI was consulted.  She was treated with antibiotics and had clinical improvement.  She was tolerating a diet and had a bowel movement by discharge.    Consultations This Hospital Stay   INFECTIOUS DISEASES IP CONSULT  GASTROENTEROLOGY IP CONSULT  PHYSICAL THERAPY ADULT IP CONSULT  OCCUPATIONAL THERAPY ADULT IP CONSULT    Code Status   Full Code    Time Spent on this Encounter   I, Dinesh Sánchez, " MD, personally saw the patient today and spent less than or equal to 30 minutes discharging this patient.       Dinesh Sánchez MD  [address] [address]  Phone: [phone]  Fax: [phone]  ______________________________________________________________________    Physical Exam   Vital Signs: Temp: 98  F (36.7  C) Temp src: Oral BP: 133/67 Pulse: 84   Resp: 16 SpO2: 93 % O2 Device: None (Room air) Oxygen Delivery: 2 LPM  Weight: 150 lbs 0 oz    See progress note       Primary Care Physician   [facility]    Discharge Orders      Home Care Referral      Reason for your hospital stay    You were admitted to the hospital with pneumonia and a COPD exacerbation.  You also had mild diverticulitis.  You were treated with antibiotics and steroids.     Activity    Your activity upon discharge: activity as tolerated     When to contact your care team    Seek medical attention if you have any of the following: chest pain, chest pressure, difficulty breathing, persistent fevers, worsening abdominal pain, or uncontrolled vomiting.     Discharge Instructions    Do not take TUMS while taking antibiotics.     Diet    Follow this diet upon discharge: low fiber diet     Hospital Follow-up with Existing Primary Care Provider (PCP)            Significant Results and Procedures   Most Recent 3 CBC's:  Recent Labs   Lab Test 04/23/25  0858 04/22/25  0500 11/25/24  0700   WBC 6.6 8.3 7.4   HGB 12.6 12.2 12.3   MCV 86 85 90    310 323     Most Recent 3 BMP's:  Recent Labs   Lab Test 04/23/25  0858 04/22/25  0500 12/12/24  1446    133* 138   POTASSIUM 4.5 3.7 4.0   CHLORIDE 102 97* 102   CO2 26 23 25   BUN 15.3 17.2 13.4   CR 0.87 0.88 0.95   ANIONGAP 9 13 11   NU 10.0 9.3 10.2   * 114* 125*   ,   Results for orders placed or performed during the hospital encounter of 04/22/25   CT Chest Pulmonary Embolism w Contrast     Narrative    EXAM: CT CHEST PULMONARY EMBOLISM W CONTRAST  LOCATION: [facility]  DATE: 4/22/2025    INDICATION: R pleuritic chest pain  COMPARISON: None.  TECHNIQUE: CT chest pulmonary angiogram during arterial phase injection of IV contrast. Multiplanar reformats and MIP reconstructions were performed. Dose reduction techniques were used.   CONTRAST: 90 cc Isovue-370    FINDINGS:  ANGIOGRAM CHEST: Pulmonary arteries are normal caliber and negative for pulmonary emboli. Thoracic aorta is negative for dissection. No CT evidence of right heart strain.    LUNGS AND PLEURA: Moderate emphysematous changes in the lungs. Subpleural consolidation/infiltrate in both lower lobes, left greater than right. Findings suggest bilateral pneumonia. Generalized bronchial wall thickening in the lower lungs suggests   bronchiolitis. Minimal bilateral pleural effusions.    MEDIASTINUM/AXILLAE: Mild hilar and mediastinal adenopathy favored to be reactive in nature. Normal heart size. No pericardial effusion. Normal caliber thoracic aorta. Large hiatal hernia.    CORONARY ARTERY CALCIFICATION: Moderate to severe.    UPPER ABDOMEN: See separate abdomen and pelvis CT report for those details.    MUSCULOSKELETAL: Mild degenerative changes in the spine.      Impression    IMPRESSION:  1.  Negative for pulmonary embolism.    2.  Emphysematous changes in the lungs.    3.  Prominent consolidation/infiltrate in both lower lobes left greater than right suspicious for bilateral pneumonia.    4.  Generalized bronchial wall thickening suggests bronchiolitis.    5.  Minimal bilateral pleural effusions.    6.  Mild hilar and mediastinal adenopathy favored to be reactive in nature. Recommend follow-up chest CT in 2-3 months following appropriate therapy to monitor for resolution of these findings.    7.  Large hiatal hernia.                 CT Abdomen Pelvis w Contrast    Narrative    EXAM: CT ABDOMEN PELVIS W  CONTRAST  LOCATION: [facility]  DATE: 4/22/2025    INDICATION: abd pain  COMPARISON: 11/18/2024  TECHNIQUE: CT scan of the abdomen and pelvis was performed following injection of IV contrast. Multiplanar reformats were obtained. Dose reduction techniques were used.  CONTRAST: 90 cc Isovue-370    FINDINGS:   LOWER CHEST: See separate chest CT report for those details.    HEPATOBILIARY: Normal.    PANCREAS: Normal.    SPLEEN: Normal.    ADRENAL GLANDS: Normal.    KIDNEYS/BLADDER: Small benign renal cysts with no follow-up needed. 3 mm nonobstructing stone left kidney. No hydronephrosis. No bladder wall thickening. Small obturator hernia in the left containing a small portion of the bladder.    BOWEL: Large hiatal hernia. Small bowel is normal in caliber. Negative for appendicitis. Colon is partially fluid-filled suggestive of a nonspecific diarrheal state. Sigmoid diverticulosis. There is some very minimal stranding and edema in the left lower   quadrant adjacent to the sigmoid colon with a mild or low-grade diverticulitis not excluded.    LYMPH NODES: Normal.    VASCULATURE: Aortic calcification. Mild ectasia of the abdominal aorta measuring 2.7 cm, unchanged.    PELVIC ORGANS: Hysterectomy.    MUSCULOSKELETAL: Mild/moderate degenerative changes in the spine.      Impression    IMPRESSION:   1.  Colon is partially fluid-filled suggestive of a nonspecific diarrheal state.    2.  Sigmoid diverticulosis. There is some very minimal stranding seen in the left lower quadrant adjacent to the sigmoid colon. Cannot completely exclude a mild or low-grade diverticulitis and clinical correlation needed. No free air or fluid   collections.    3.  3 mm nonobstructing stone left kidney.    4.  Small left obturator hernia containing a small portion of the bladder.    5.  Large hiatal hernia.    6.  Hysterectomy.    7.  See separate chest CT report for those details.                      Discharge  Medications   Current Discharge Medication List        START taking these medications    Details   levofloxacin (LEVAQUIN) 750 MG tablet Take 1 tablet (750 mg) by mouth daily for 2 days.  Qty: 2 tablet, Refills: 0    Associated Diagnoses: Bacterial pneumonia      metroNIDAZOLE (FLAGYL) 500 MG tablet Take 1 tablet (500 mg) by mouth 3 times daily for 2 days.  Qty: 6 tablet, Refills: 0    Associated Diagnoses: Diverticulitis of colon      predniSONE (DELTASONE) 20 MG tablet Take 2 tablets (40 mg) by mouth daily for 2 days.  Qty: 4 tablet, Refills: 0    Associated Diagnoses: COPD exacerbation (H)           CONTINUE these medications which have NOT CHANGED    Details   acetaminophen (TYLENOL) 325 MG tablet Take 1 tablet (325 mg) by mouth every 4 hours as needed for mild pain or fever.  Qty: 30 tablet, Refills: 0    Associated Diagnoses: Diverticulitis of colon with perforation      albuterol (PROAIR HFA/PROVENTIL HFA/VENTOLIN HFA) 108 (90 Base) MCG/ACT inhaler Inhale 1 puff into the lungs every 4 hours as needed.      calcium carbonate (TUMS) 500 MG chewable tablet Take 1 chew tab by mouth 4 times daily as needed for heartburn.      Calcium Magnesium Zinc 333-133-5 MG TABS Take 3 tablets by mouth daily.      cyanocobalamin (VITAMIN B-12) 1000 MCG tablet Take 1,000 mcg by mouth daily.      famotidine (PEPCID) 20 MG tablet Take 20 mg by mouth daily.      losartan (COZAAR) 50 MG tablet Take 25 mg by mouth daily.      polyethylene glycol (MIRALAX) 17 GM/Dose powder Take 17 g by mouth daily.  Qty: 510 g, Refills: 0    Associated Diagnoses: Diverticulitis of colon with perforation      potassium gluconate 2.5 MEQ tablet Take 1 tablet by mouth daily.      senna-docusate (SENOKOT-S/PERICOLACE) 8.6-50 MG tablet Take 1 tablet by mouth 2 times daily as needed for constipation.  Qty: 60 tablet, Refills: 0    Associated Diagnoses: Diverticulitis of colon with perforation      TRELEGY ELLIPTA 100-62.5-25 MCG/ACT oral inhaler Inhale 1  puff into the lungs daily.      simvastatin (ZOCOR) 20 MG tablet Take 20 mg by mouth at bedtime.           Allergies   Allergies   Allergen Reactions    Aspirin Hives    Cephalexin Hives    Ibuprofen Hives    Penicillin V Hives    Penicillins Hives    Potassium Hives

## 2025-04-24 NOTE — CONSULTS
Care Management Initial Consult    General Information  Assessment completed with: Patient,    Type of CM/SW Visit: Initial Assessment    Primary Care Provider verified and updated as needed: Yes   Reason for Consult: discharge planning       Communication Assessment  Patient's communication style: spoken language (English or Bilingual)    Hearing Difficulty or Deaf: no   Wear Glasses or Blind: yes    Cognitive  Cognitive/Neuro/Behavioral: WDL                      Living Environment:   People in home: child(hector), adult     Current living Arrangements: house      Able to return to prior arrangements: yes       Family/Social Support:  Care provided by: self  Provides care for: no one     Support system: Children          Description of Support System: Supportive, Involved         Current Resources:   Patient receiving home care services: No        Community Resources: None  Equipment currently used at home: grab bar, toilet, grab bar, tub/shower, shower chair  Supplies currently used at home: None    Employment/Financial:  Employment Status: retired        Financial Concerns: none   Referral to Financial Worker: No       Does the patient's insurance plan have a 3 day qualifying hospital stay waiver?  No    Lifestyle & Psychosocial Needs:  Social Drivers of Health     Food Insecurity: Low Risk  (4/22/2025)    Food Insecurity     Within the past 12 months, did you worry that your food would run out before you got money to buy more?: No     Within the past 12 months, did the food you bought just not last and you didn t have money to get more?: No   Depression: Not on file   Housing Stability: Low Risk  (4/22/2025)    Housing Stability     Do you have housing? : Yes     Are you worried about losing your housing?: No   Tobacco Use: Medium Risk (12/3/2024)    Patient History     Smoking Tobacco Use: Former     Smokeless Tobacco Use: Unknown     Passive Exposure: Not on file   Financial Resource Strain: Low Risk  (4/22/2025)     Financial Resource Strain     Within the past 12 months, have you or your family members you live with been unable to get utilities (heat, electricity) when it was really needed?: No   Alcohol Use: Not on file   Transportation Needs: Low Risk  (4/22/2025)    Transportation Needs     Within the past 12 months, has lack of transportation kept you from medical appointments, getting your medicines, non-medical meetings or appointments, work, or from getting things that you need?: No   Physical Activity: Not on file   Interpersonal Safety: Low Risk  (4/22/2025)    Interpersonal Safety     Do you feel physically and emotionally safe where you currently live?: Yes     Within the past 12 months, have you been hit, slapped, kicked or otherwise physically hurt by someone?: No     Within the past 12 months, have you been humiliated or emotionally abused in other ways by your partner or ex-partner?: No   Stress: Not on file   Social Connections: Not on file   Health Literacy: Not on file       Functional Status:  Prior to admission patient needed assistance:   Dependent ADLs:: Independent  Dependent IADLs:: Independent       Mental Health Status:  Mental Health Status: No Current Concerns       Chemical Dependency Status:  Chemical Dependency Status: No Current Concerns             Values/Beliefs:  Spiritual, Cultural Beliefs, Congregation Practices, Values that affect care: no               Discussed  Partnership in Safe Discharge Planning  document with patient/family: No    Additional Information:  9:51 AM  CMA completed: Follow  Last Note:04/24/25  Prior living: house with adult son  Medical plan/delay: progression  Dispo: Home with home care (PT/OT/RN) - referral sent to [facility]  Ride: Family  Hand off sent: No  CM to do: fax HC orders    SW discussed home care recommendation.  Pt in agreement to  and had no agency preference.  Referral sent to [facility].  Waiting on response.      11:53 AM  [facility]  accepted for home care services.      Criselda Garcia, LICSW

## 2025-04-25 ENCOUNTER — APPOINTMENT (OUTPATIENT)
Dept: OCCUPATIONAL THERAPY | Facility: CLINIC | Age: 75
DRG: 193 | End: 2025-04-25
Payer: COMMERCIAL

## 2025-04-25 ENCOUNTER — APPOINTMENT (OUTPATIENT)
Dept: PHYSICAL THERAPY | Facility: CLINIC | Age: 75
DRG: 193 | End: 2025-04-25
Payer: COMMERCIAL

## 2025-04-25 VITALS
SYSTOLIC BLOOD PRESSURE: 136 MMHG | HEART RATE: 97 BPM | BODY MASS INDEX: 27.6 KG/M2 | HEIGHT: 62 IN | RESPIRATION RATE: 20 BRPM | DIASTOLIC BLOOD PRESSURE: 71 MMHG | WEIGHT: 150 LBS | TEMPERATURE: 98.5 F | OXYGEN SATURATION: 92 %

## 2025-04-25 VITALS
RESPIRATION RATE: 20 BRPM | BODY MASS INDEX: 27.6 KG/M2 | SYSTOLIC BLOOD PRESSURE: 108 MMHG | OXYGEN SATURATION: 92 % | TEMPERATURE: 98.4 F | HEIGHT: 62 IN | WEIGHT: 150 LBS | DIASTOLIC BLOOD PRESSURE: 59 MMHG | HEART RATE: 96 BPM

## 2025-04-25 PROCEDURE — 99238 HOSP IP/OBS DSCHRG MGMT 30/<: CPT | Performed by: HOSPITALIST

## 2025-04-25 PROCEDURE — 999N000157 HC STATISTIC RCP TIME EA 10 MIN

## 2025-04-25 PROCEDURE — 97535 SELF CARE MNGMENT TRAINING: CPT | Mod: GO

## 2025-04-25 PROCEDURE — 94640 AIRWAY INHALATION TREATMENT: CPT | Mod: 76

## 2025-04-25 PROCEDURE — 250N000013 HC RX MED GY IP 250 OP 250 PS 637: Performed by: HOSPITALIST

## 2025-04-25 PROCEDURE — 250N000011 HC RX IP 250 OP 636: Performed by: STUDENT IN AN ORGANIZED HEALTH CARE EDUCATION/TRAINING PROGRAM

## 2025-04-25 PROCEDURE — 250N000013 HC RX MED GY IP 250 OP 250 PS 637: Performed by: STUDENT IN AN ORGANIZED HEALTH CARE EDUCATION/TRAINING PROGRAM

## 2025-04-25 PROCEDURE — 250N000012 HC RX MED GY IP 250 OP 636 PS 637: Performed by: STUDENT IN AN ORGANIZED HEALTH CARE EDUCATION/TRAINING PROGRAM

## 2025-04-25 PROCEDURE — 94640 AIRWAY INHALATION TREATMENT: CPT

## 2025-04-25 PROCEDURE — 97116 GAIT TRAINING THERAPY: CPT | Mod: GP

## 2025-04-25 PROCEDURE — 250N000009 HC RX 250: Performed by: STUDENT IN AN ORGANIZED HEALTH CARE EDUCATION/TRAINING PROGRAM

## 2025-04-25 PROCEDURE — 250N000013 HC RX MED GY IP 250 OP 250 PS 637: Performed by: PHYSICIAN ASSISTANT

## 2025-04-25 RX ORDER — PREDNISONE 20 MG/1
40 TABLET ORAL DAILY
Qty: 2 TABLET | Refills: 0 | Status: SHIPPED | OUTPATIENT
Start: 2025-04-26 | End: 2025-04-27

## 2025-04-25 RX ORDER — METRONIDAZOLE 500 MG/1
500 TABLET ORAL 3 TIMES DAILY
Qty: 4 TABLET | Refills: 0 | Status: SHIPPED | OUTPATIENT
Start: 2025-04-25 | End: 2025-04-27

## 2025-04-25 RX ORDER — LEVOFLOXACIN 750 MG/1
750 TABLET, FILM COATED ORAL DAILY
Qty: 1 TABLET | Refills: 0 | Status: SHIPPED | OUTPATIENT
Start: 2025-04-26 | End: 2025-04-27

## 2025-04-25 RX ADMIN — LEVOFLOXACIN 750 MG: 750 TABLET, FILM COATED ORAL at 08:56

## 2025-04-25 RX ADMIN — IPRATROPIUM BROMIDE AND ALBUTEROL SULFATE 3 ML: .5; 3 SOLUTION RESPIRATORY (INHALATION) at 12:48

## 2025-04-25 RX ADMIN — PREDNISONE 40 MG: 20 TABLET ORAL at 08:56

## 2025-04-25 RX ADMIN — METRONIDAZOLE 500 MG: 500 TABLET ORAL at 08:56

## 2025-04-25 RX ADMIN — IPRATROPIUM BROMIDE AND ALBUTEROL SULFATE 3 ML: .5; 3 SOLUTION RESPIRATORY (INHALATION) at 08:14

## 2025-04-25 RX ADMIN — LOSARTAN POTASSIUM 25 MG: 25 TABLET, FILM COATED ORAL at 08:56

## 2025-04-25 RX ADMIN — ENOXAPARIN SODIUM 40 MG: 40 INJECTION SUBCUTANEOUS at 08:56

## 2025-04-25 RX ADMIN — PANTOPRAZOLE SODIUM 40 MG: 40 TABLET, DELAYED RELEASE ORAL at 08:56

## 2025-04-25 ASSESSMENT — ACTIVITIES OF DAILY LIVING (ADL)
ADLS_ACUITY_SCORE: 40

## 2025-04-25 NOTE — PROGRESS NOTES
Care Management Discharge Note    Discharge Date: 04/25/2025       Discharge Disposition: Home, Home Care    Discharge Services: Home Care    Discharge DME: None    Discharge Transportation: family or friend will provide    Private pay costs discussed: Not applicable    Does the patient's insurance plan have a 3 day qualifying hospital stay waiver?  Yes     Which insurance plan 3 day waiver is available? Alternative insurance waiver    Will the waiver be used for post-acute placement? No    PAS Confirmation Code:    Patient/family educated on Medicare website which has current facility and service quality ratings: yes    Education Provided on the Discharge Plan: Yes  Persons Notified of Discharge Plans: [facility]  Patient/Family in Agreement with the Plan: yes    Handoff Referral Completed: No, handoff not indicated or clinically appropriate    Additional Information:  Discharge orders faxed to [facility]    Dolly Cadet, LICSW

## 2025-04-25 NOTE — PROGRESS NOTES
"                                                          RESPIRATORY CARE NOTE    Patient given scheduled QID DuoNeb.   On 1 L nasal cannula with saturation 92%, will have O2 Goal 88- 92%. BBS remains CTAB.   Home O2 eval: Pt qualified 1 L at rest, and 2 L with activity.   RT will continue with scheduled treatment and assess.     /64 (BP Location: Left arm)   Pulse 88   Temp 98  F (36.7  C) (Oral)   Resp 19   Ht 1.575 m (5' 2\")   Wt 68 kg (150 lb)   SpO2 98%   BMI 27.44 kg/m      Lindsey Butler, RT on 4/24/2025 at 11:35 PM    "

## 2025-04-25 NOTE — PLAN OF CARE
VSS on 1L. Dyspnea on exertion. Diminished lung sounds. Denies pain. Ambulating with A1 FWW GB.       Problem: Adult Inpatient Plan of Care  Goal: Optimal Comfort and Wellbeing  Outcome: Progressing  Intervention: Provide Person-Centered Care  Recent Flowsheet Documentation  Taken 4/25/2025 0015 by Erica Escalante, RN  Trust Relationship/Rapport:   care explained   choices provided   emotional support provided   empathic listening provided   questions answered   questions encouraged   reassurance provided   thoughts/feelings acknowledged  Taken 4/24/2025 2014 by Erica Escalante, RN  Trust Relationship/Rapport:   care explained   choices provided   emotional support provided   empathic listening provided   questions answered   questions encouraged   reassurance provided   thoughts/feelings acknowledged     Problem: Adult Inpatient Plan of Care  Goal: Readiness for Transition of Care  Outcome: Progressing     Problem: Gas Exchange Impaired  Goal: Optimal Gas Exchange  Outcome: Progressing

## 2025-04-25 NOTE — CARE PLAN
Patient has been assessed for Home Oxygen needs. Oxygen readings:    *Pulse oximetry (SpO2) = 91% on room air at rest while awake.    *SpO2 improved to 93% on 2liters/minute at rest.    *SpO2 = 87% on room air during activity/with exercise.    *SpO2 improved to 94% on 2liters/minute during activity/with exercise.

## 2025-04-25 NOTE — PROGRESS NOTES
"[facility]    Medicine Progress Note - Hospitalist Service    Date of Admission:  4/22/2025    Assessment & Plan   Alexandra Mckeon is a 74 year old female admitted with acute hypoxic respiratory failure due to community acquired pneumonia and COPD exacerbation.  Presentation complicated by diverticulitis.  She is hemodynamically stable.  She requires 1L of oxygen with ambulation only.  Therefore, medically ready for discharge.    # Acute hypoxic respiratory failure due to community acquired pneumonia and COPD exacerbation  - empiric abx  - hold trelegy given use of scheduled duonebs    # Diverticulitis  - to complete course of antibiotics    # HTN  - losartan    # HLD          Diet: Diet  Low Fiber Diet    Ugalde Catheter: Not present  Lines: None     Cardiac Monitoring: None  Code Status: Full Code      Clinically Significant Risk Factors                   # Hypertension: Noted on problem list            # Overweight: Estimated body mass index is 27.44 kg/m  as calculated from the following:    Height as of this encounter: 1.575 m (5' 2\").    Weight as of this encounter: 68 kg (150 lb)., PRESENT ON ADMISSION     # Financial/Environmental Concerns: none         Social Drivers of Health    Tobacco Use: Medium Risk (12/3/2024)    Patient History     Smoking Tobacco Use: Former     Smokeless Tobacco Use: Unknown          Disposition Plan     Medically Ready for Discharge: Ready Now             Dinesh Sánchez MD  Hospitalist Service  [facility]  Securely message with [employer] (more info)  Text page via [employer] Paging/Directory   ______________________________________________________________________    Interval History   Denies dyspnea, chest pain, or chest pressure.    Physical Exam   Vital Signs: Temp: 98.4  F (36.9  C) Temp src: Oral BP: 108/59 Pulse: 96   Resp: 20 SpO2: 93 % O2 Device: Nasal cannula Oxygen Delivery: 1 LPM  Weight: 150 lbs 0 oz    Gen:  sitting in chair " in no extremis, overall well appearing  Neuro:  alert, conversant  CV:  nl rate, regular rhythm  Pulm:  no acute resp distress, crackles/rhonchi at the left base  GI:  abdomen NTTP    Medical Decision Making             Data

## 2025-04-25 NOTE — CARE PLAN
04/25/25 1030   Home Oxygen Assessment (RN/RT ONLY)   Does patient have oxygen at home? No   1. SpO2 on room air at rest while awake 91       Oxygen LPM at rest 2   3. SpO2 on room air during Activity/with exercise 87   4. SpO2 with oxygen during activity/with exercise 94       Oxygen LPM during activity/with exercise 2   Does patient qualify for Home O2? Yes

## 2025-04-25 NOTE — PLAN OF CARE
Pt A&Ox4, VSS, afebrile. Home Oxygen Assessment completed: qualifies for home O2 @home. Please refer to note. Worked with PT and OT today. Pt on 1L NC at rest, and 2L NC with ambulation/activity to sustain >88%. Lung sounds diminished with expiratory wheezing. Scheduled nebulizer treatments. Dyspnea observed on exertion, pt minimized this effort. Up with x1, GB and walker. Pt uses the call light appropriately and able to make needs known. Chair alarm and bed alarm on for safety.         Problem: Adult Inpatient Plan of Care  Goal: Optimal Comfort and Wellbeing  Intervention: Provide Person-Centered Care    Problem: Delirium  Goal: Improved Behavioral Control  Outcome: Progressing  Intervention: Minimize Safety Risk    Problem: Delirium  Goal: Improved Behavioral Control  Intervention: Minimize Safety Risk       Problem: Delirium  Goal: Improved Attention and Thought Clarity  Intervention: Maximize Cognitive Function       Problem: Skin Injury Risk Increased  Goal: Skin Health and Integrity  Intervention: Optimize Skin Protection       Problem: Gas Exchange Impaired  Goal: Optimal Gas Exchange  Outcome: Adequate for Care Transition  Intervention: Optimize Oxygenation and Ventilation

## 2025-04-25 NOTE — PLAN OF CARE
Physical Therapy Discharge Summary    Reason for therapy discharge:    Discharged to home with home therapy.    Progress towards therapy goal(s). See goals on Care Plan in [facility] electronic health record for goal details.  Goals partially met.  Barriers to achieving goals:   discharge from facility.    Therapy recommendation(s):    Continued therapy is recommended.  Rationale/Recommendations:  Continue with use of walker and home PT as pt is progressing towards goals.

## 2025-04-25 NOTE — PROGRESS NOTES
Occupational Therapy Discharge Summary    Reason for therapy discharge:    Discharged to home with home therapy.    Progress towards therapy goal(s). See goals on Care Plan in [facility] electronic health record for goal details.  Goals met    Therapy recommendation(s):    Continued therapy is recommended.  Rationale/Recommendations:  Ensure safety at home for ADL.

## 2025-04-26 NOTE — PROGRESS NOTES
Note as Lead for 4/26/2025   - received page from [facility], after calling them back (and queuing for 8+ minutes as there was not a provider direct line at this location), they stated they were wondering about antibiotic duration - question longer?    -Writer provided clarification and reassurance that Dr. Sánchez had ordered appropriate amount and duration of antibiotics as per ID recommendations  - writer also confirmed normal renal function on the BMP from 4/23  Fercho Baker MD on 4/26/2025 at 10:46 AM

## 2025-06-12 ENCOUNTER — LAB REQUISITION (OUTPATIENT)
Dept: LAB | Facility: CLINIC | Age: 75
End: 2025-06-12
Payer: COMMERCIAL

## 2025-06-12 DIAGNOSIS — I10 ESSENTIAL (PRIMARY) HYPERTENSION: ICD-10-CM

## 2025-06-12 DIAGNOSIS — E78.2 MIXED HYPERLIPIDEMIA: ICD-10-CM

## 2025-06-12 LAB
ANION GAP SERPL CALCULATED.3IONS-SCNC: 10 MMOL/L (ref 7–15)
BUN SERPL-MCNC: 12.4 MG/DL (ref 8–23)
CALCIUM SERPL-MCNC: 10.1 MG/DL (ref 8.8–10.4)
CHLORIDE SERPL-SCNC: 104 MMOL/L (ref 98–107)
CHOLEST SERPL-MCNC: 167 MG/DL
CREAT SERPL-MCNC: 0.98 MG/DL (ref 0.51–0.95)
EGFRCR SERPLBLD CKD-EPI 2021: 60 ML/MIN/1.73M2
FASTING STATUS PATIENT QL REPORTED: NO
FASTING STATUS PATIENT QL REPORTED: NO
GLUCOSE SERPL-MCNC: 85 MG/DL (ref 70–99)
HCO3 SERPL-SCNC: 26 MMOL/L (ref 22–29)
HDLC SERPL-MCNC: 54 MG/DL
LDLC SERPL CALC-MCNC: 91 MG/DL
NONHDLC SERPL-MCNC: 113 MG/DL
POTASSIUM SERPL-SCNC: 4.6 MMOL/L (ref 3.4–5.3)
SODIUM SERPL-SCNC: 140 MMOL/L (ref 135–145)
TRIGL SERPL-MCNC: 108 MG/DL

## 2025-06-12 PROCEDURE — 80061 LIPID PANEL: CPT | Mod: ORL | Performed by: STUDENT IN AN ORGANIZED HEALTH CARE EDUCATION/TRAINING PROGRAM

## 2025-06-12 PROCEDURE — 80048 BASIC METABOLIC PNL TOTAL CA: CPT | Mod: ORL | Performed by: STUDENT IN AN ORGANIZED HEALTH CARE EDUCATION/TRAINING PROGRAM

## 2025-08-24 ENCOUNTER — APPOINTMENT (OUTPATIENT)
Dept: CT IMAGING | Facility: CLINIC | Age: 75
End: 2025-08-24
Attending: STUDENT IN AN ORGANIZED HEALTH CARE EDUCATION/TRAINING PROGRAM
Payer: COMMERCIAL

## 2025-08-24 ENCOUNTER — HOSPITAL ENCOUNTER (OUTPATIENT)
Facility: CLINIC | Age: 75
Setting detail: OBSERVATION
Discharge: HOME | End: 2025-08-27
Attending: STUDENT IN AN ORGANIZED HEALTH CARE EDUCATION/TRAINING PROGRAM | Admitting: STUDENT IN AN ORGANIZED HEALTH CARE EDUCATION/TRAINING PROGRAM
Payer: COMMERCIAL

## 2025-08-24 ENCOUNTER — TRANSFERRED RECORDS (OUTPATIENT)
Dept: HEALTH INFORMATION MANAGEMENT | Facility: CLINIC | Age: 75
End: 2025-08-24
Payer: COMMERCIAL

## 2025-08-24 DIAGNOSIS — J18.9 ATYPICAL PNEUMONIA: ICD-10-CM

## 2025-08-24 DIAGNOSIS — T38.0X5A STEROID-INDUCED GASTRITIS: ICD-10-CM

## 2025-08-24 DIAGNOSIS — J18.9 PNEUMONIA OF BOTH LOWER LOBES DUE TO INFECTIOUS ORGANISM: ICD-10-CM

## 2025-08-24 DIAGNOSIS — K29.60 STEROID-INDUCED GASTRITIS: ICD-10-CM

## 2025-08-24 DIAGNOSIS — J96.01 ACUTE HYPOXEMIC RESPIRATORY FAILURE (H): ICD-10-CM

## 2025-08-24 DIAGNOSIS — J44.1 COPD EXACERBATION (H): Primary | ICD-10-CM

## 2025-08-24 LAB
ALBUMIN SERPL BCG-MCNC: 4 G/DL (ref 3.5–5.2)
ALP SERPL-CCNC: 77 U/L (ref 40–150)
ALT SERPL W P-5'-P-CCNC: 6 U/L (ref 0–50)
ANION GAP SERPL CALCULATED.3IONS-SCNC: 12 MMOL/L (ref 7–15)
AST SERPL W P-5'-P-CCNC: 16 U/L (ref 0–45)
ATRIAL RATE - MUSE: 102 BPM
BASE EXCESS BLDV CALC-SCNC: 2.4 MMOL/L (ref -3–3)
BASOPHILS # BLD AUTO: 0.07 10E3/UL (ref 0–0.2)
BASOPHILS NFR BLD AUTO: 0.8 %
BILIRUB SERPL-MCNC: 0.5 MG/DL
BUN SERPL-MCNC: 16.2 MG/DL (ref 8–23)
CALCIUM SERPL-MCNC: 10.1 MG/DL (ref 8.8–10.4)
CHLORIDE SERPL-SCNC: 103 MMOL/L (ref 98–107)
CREAT SERPL-MCNC: 1.06 MG/DL (ref 0.51–0.95)
DIASTOLIC BLOOD PRESSURE - MUSE: 78 MMHG
EGFRCR SERPLBLD CKD-EPI 2021: 54 ML/MIN/1.73M2
EOSINOPHIL # BLD AUTO: 0.51 10E3/UL (ref 0–0.7)
EOSINOPHIL NFR BLD AUTO: 6.1 %
ERYTHROCYTE [DISTWIDTH] IN BLOOD BY AUTOMATED COUNT: 13 % (ref 10–15)
GLUCOSE SERPL-MCNC: 135 MG/DL (ref 70–99)
HCO3 BLDV-SCNC: 26 MMOL/L (ref 21–28)
HCO3 SERPL-SCNC: 21 MMOL/L (ref 22–29)
HCT VFR BLD AUTO: 36.4 % (ref 35–47)
HGB BLD-MCNC: 12.2 G/DL (ref 11.7–15.7)
IMM GRANULOCYTES # BLD: <0.03 10E3/UL
IMM GRANULOCYTES NFR BLD: 0.2 %
INTERPRETATION ECG - MUSE: NORMAL
LYMPHOCYTES # BLD AUTO: 1.34 10E3/UL (ref 0.8–5.3)
LYMPHOCYTES NFR BLD AUTO: 16.1 %
MCH RBC QN AUTO: 28.9 PG (ref 26.5–33)
MCHC RBC AUTO-ENTMCNC: 33.5 G/DL (ref 31.5–36.5)
MCV RBC AUTO: 86.3 FL (ref 78–100)
MONOCYTES # BLD AUTO: 0.85 10E3/UL (ref 0–1.3)
MONOCYTES NFR BLD AUTO: 10.2 %
NEUTROPHILS # BLD AUTO: 5.51 10E3/UL (ref 1.6–8.3)
NEUTROPHILS NFR BLD AUTO: 66.6 %
NRBC # BLD AUTO: <0.03 10E3/UL
NRBC BLD AUTO-RTO: 0 /100
NT-PROBNP SERPL-MCNC: 454 PG/ML (ref 0–624)
O2/TOTAL GAS SETTING VFR VENT: 37 %
OXYHGB MFR BLDV: 81 % (ref 70–75)
P AXIS - MUSE: 46 DEGREES
PCO2 BLDV: 37 MM HG (ref 40–50)
PH BLDV: 7.46 [PH] (ref 7.32–7.43)
PLATELET # BLD AUTO: 228 10E3/UL (ref 150–450)
PO2 BLDV: 45 MM HG (ref 25–47)
POTASSIUM SERPL-SCNC: 3.9 MMOL/L (ref 3.4–5.3)
PR INTERVAL - MUSE: 154 MS
PROT SERPL-MCNC: 6.8 G/DL (ref 6.4–8.3)
QRS DURATION - MUSE: 80 MS
QT - MUSE: 334 MS
QTC - MUSE: 435 MS
R AXIS - MUSE: 32 DEGREES
RBC # BLD AUTO: 4.22 10E6/UL (ref 3.8–5.2)
SAO2 % BLDV: 82.6 % (ref 70–75)
SODIUM SERPL-SCNC: 136 MMOL/L (ref 135–145)
SYSTOLIC BLOOD PRESSURE - MUSE: 127 MMHG
T AXIS - MUSE: 31 DEGREES
TROPONIN T SERPL HS-MCNC: 10 NG/L
TROPONIN T SERPL HS-MCNC: 10 NG/L
VENTRICULAR RATE- MUSE: 102 BPM
WBC # BLD AUTO: 8.3 10E3/UL (ref 4–11)

## 2025-08-24 PROCEDURE — 250N000009 HC RX 250: Performed by: STUDENT IN AN ORGANIZED HEALTH CARE EDUCATION/TRAINING PROGRAM

## 2025-08-24 PROCEDURE — 96374 THER/PROPH/DIAG INJ IV PUSH: CPT | Mod: 59

## 2025-08-24 PROCEDURE — 94640 AIRWAY INHALATION TREATMENT: CPT

## 2025-08-24 PROCEDURE — 36415 COLL VENOUS BLD VENIPUNCTURE: CPT | Performed by: STUDENT IN AN ORGANIZED HEALTH CARE EDUCATION/TRAINING PROGRAM

## 2025-08-24 PROCEDURE — 85004 AUTOMATED DIFF WBC COUNT: CPT | Performed by: STUDENT IN AN ORGANIZED HEALTH CARE EDUCATION/TRAINING PROGRAM

## 2025-08-24 PROCEDURE — 255N000002 HC RX 255 OP 636: Performed by: STUDENT IN AN ORGANIZED HEALTH CARE EDUCATION/TRAINING PROGRAM

## 2025-08-24 PROCEDURE — 999N000157 HC STATISTIC RCP TIME EA 10 MIN

## 2025-08-24 PROCEDURE — 84484 ASSAY OF TROPONIN QUANT: CPT | Performed by: STUDENT IN AN ORGANIZED HEALTH CARE EDUCATION/TRAINING PROGRAM

## 2025-08-24 PROCEDURE — 99285 EMERGENCY DEPT VISIT HI MDM: CPT | Mod: 25 | Performed by: STUDENT IN AN ORGANIZED HEALTH CARE EDUCATION/TRAINING PROGRAM

## 2025-08-24 PROCEDURE — 83880 ASSAY OF NATRIURETIC PEPTIDE: CPT | Performed by: STUDENT IN AN ORGANIZED HEALTH CARE EDUCATION/TRAINING PROGRAM

## 2025-08-24 PROCEDURE — 82805 BLOOD GASES W/O2 SATURATION: CPT | Performed by: STUDENT IN AN ORGANIZED HEALTH CARE EDUCATION/TRAINING PROGRAM

## 2025-08-24 PROCEDURE — 93005 ELECTROCARDIOGRAM TRACING: CPT | Performed by: STUDENT IN AN ORGANIZED HEALTH CARE EDUCATION/TRAINING PROGRAM

## 2025-08-24 PROCEDURE — G0378 HOSPITAL OBSERVATION PER HR: HCPCS

## 2025-08-24 PROCEDURE — 80053 COMPREHEN METABOLIC PANEL: CPT | Performed by: STUDENT IN AN ORGANIZED HEALTH CARE EDUCATION/TRAINING PROGRAM

## 2025-08-24 RX ORDER — ALBUTEROL SULFATE 0.83 MG/ML
5 SOLUTION RESPIRATORY (INHALATION) ONCE
Status: COMPLETED | OUTPATIENT
Start: 2025-08-24 | End: 2025-08-24

## 2025-08-24 RX ORDER — IPRATROPIUM BROMIDE AND ALBUTEROL SULFATE 2.5; .5 MG/3ML; MG/3ML
3 SOLUTION RESPIRATORY (INHALATION) ONCE
Status: COMPLETED | OUTPATIENT
Start: 2025-08-24 | End: 2025-08-24

## 2025-08-24 RX ADMIN — IPRATROPIUM BROMIDE AND ALBUTEROL SULFATE 3 ML: .5; 3 SOLUTION RESPIRATORY (INHALATION) at 22:16

## 2025-08-24 RX ADMIN — ALBUTEROL SULFATE 5 MG: 2.5 SOLUTION RESPIRATORY (INHALATION) at 22:16

## 2025-08-24 RX ADMIN — IOHEXOL 100 ML: 350 INJECTION, SOLUTION INTRAVENOUS at 21:59

## 2025-08-24 ASSESSMENT — ACTIVITIES OF DAILY LIVING (ADL)
ADLS_ACUITY_SCORE: 55
ADLS_ACUITY_SCORE: 55

## 2025-08-24 ASSESSMENT — COLUMBIA-SUICIDE SEVERITY RATING SCALE - C-SSRS
6. HAVE YOU EVER DONE ANYTHING, STARTED TO DO ANYTHING, OR PREPARED TO DO ANYTHING TO END YOUR LIFE?: NO
1. IN THE PAST MONTH, HAVE YOU WISHED YOU WERE DEAD OR WISHED YOU COULD GO TO SLEEP AND NOT WAKE UP?: NO
2. HAVE YOU ACTUALLY HAD ANY THOUGHTS OF KILLING YOURSELF IN THE PAST MONTH?: NO

## 2025-08-25 PROBLEM — J18.9 ATYPICAL PNEUMONIA: Status: ACTIVE | Noted: 2025-08-25

## 2025-08-25 LAB
ALBUMIN SERPL BCG-MCNC: 3.8 G/DL (ref 3.5–5.2)
ALP SERPL-CCNC: 76 U/L (ref 40–150)
ALT SERPL W P-5'-P-CCNC: <5 U/L (ref 0–50)
ANION GAP SERPL CALCULATED.3IONS-SCNC: 15 MMOL/L (ref 7–15)
AST SERPL W P-5'-P-CCNC: 16 U/L (ref 0–45)
BASOPHILS # BLD AUTO: 0.03 10E3/UL (ref 0–0.2)
BASOPHILS NFR BLD AUTO: 0.6 %
BILIRUB SERPL-MCNC: 0.3 MG/DL
BUN SERPL-MCNC: 13.7 MG/DL (ref 8–23)
CALCIUM SERPL-MCNC: 9.6 MG/DL (ref 8.8–10.4)
CHLORIDE SERPL-SCNC: 104 MMOL/L (ref 98–107)
CREAT SERPL-MCNC: 0.97 MG/DL (ref 0.51–0.95)
EGFRCR SERPLBLD CKD-EPI 2021: 60 ML/MIN/1.73M2
EOSINOPHIL # BLD AUTO: <0.03 10E3/UL (ref 0–0.7)
EOSINOPHIL NFR BLD AUTO: 0.2 %
ERYTHROCYTE [DISTWIDTH] IN BLOOD BY AUTOMATED COUNT: 13 % (ref 10–15)
GLUCOSE SERPL-MCNC: 160 MG/DL (ref 70–99)
HCO3 SERPL-SCNC: 20 MMOL/L (ref 22–29)
HCT VFR BLD AUTO: 38.3 % (ref 35–47)
HGB BLD-MCNC: 12.3 G/DL (ref 11.7–15.7)
IMM GRANULOCYTES # BLD: <0.03 10E3/UL
IMM GRANULOCYTES NFR BLD: 0.4 %
LYMPHOCYTES # BLD AUTO: 0.58 10E3/UL (ref 0.8–5.3)
LYMPHOCYTES NFR BLD AUTO: 11.9 %
MCH RBC QN AUTO: 29 PG (ref 26.5–33)
MCHC RBC AUTO-ENTMCNC: 32.1 G/DL (ref 31.5–36.5)
MCV RBC AUTO: 90.3 FL (ref 78–100)
MONOCYTES # BLD AUTO: 0.15 10E3/UL (ref 0–1.3)
MONOCYTES NFR BLD AUTO: 3.1 %
NEUTROPHILS # BLD AUTO: 4.07 10E3/UL (ref 1.6–8.3)
NEUTROPHILS NFR BLD AUTO: 83.8 %
NRBC # BLD AUTO: <0.03 10E3/UL
NRBC BLD AUTO-RTO: 0 /100
PLATELET # BLD AUTO: 237 10E3/UL (ref 150–450)
POTASSIUM SERPL-SCNC: 4.3 MMOL/L (ref 3.4–5.3)
PROT SERPL-MCNC: 6.8 G/DL (ref 6.4–8.3)
RBC # BLD AUTO: 4.24 10E6/UL (ref 3.8–5.2)
SODIUM SERPL-SCNC: 139 MMOL/L (ref 135–145)
WBC # BLD AUTO: 4.86 10E3/UL (ref 4–11)

## 2025-08-25 PROCEDURE — 250N000009 HC RX 250: Performed by: INTERNAL MEDICINE

## 2025-08-25 PROCEDURE — 250N000013 HC RX MED GY IP 250 OP 250 PS 637: Performed by: INTERNAL MEDICINE

## 2025-08-25 PROCEDURE — 250N000011 HC RX IP 250 OP 636: Mod: JZ | Performed by: INTERNAL MEDICINE

## 2025-08-25 PROCEDURE — 85004 AUTOMATED DIFF WBC COUNT: CPT | Performed by: INTERNAL MEDICINE

## 2025-08-25 PROCEDURE — 94640 AIRWAY INHALATION TREATMENT: CPT

## 2025-08-25 PROCEDURE — 258N000003 HC RX IP 258 OP 636: Performed by: INTERNAL MEDICINE

## 2025-08-25 PROCEDURE — G0378 HOSPITAL OBSERVATION PER HR: HCPCS

## 2025-08-25 PROCEDURE — 82310 ASSAY OF CALCIUM: CPT | Performed by: INTERNAL MEDICINE

## 2025-08-25 PROCEDURE — 99232 SBSQ HOSP IP/OBS MODERATE 35: CPT | Performed by: INTERNAL MEDICINE

## 2025-08-25 PROCEDURE — 999N000157 HC STATISTIC RCP TIME EA 10 MIN

## 2025-08-25 PROCEDURE — 36415 COLL VENOUS BLD VENIPUNCTURE: CPT | Performed by: INTERNAL MEDICINE

## 2025-08-25 RX ORDER — ONDANSETRON 4 MG/1
4 TABLET, ORALLY DISINTEGRATING ORAL EVERY 6 HOURS PRN
Status: DISCONTINUED | OUTPATIENT
Start: 2025-08-25 | End: 2025-08-27 | Stop reason: HOSPADM

## 2025-08-25 RX ORDER — METHYLPREDNISOLONE SODIUM SUCCINATE 40 MG/ML
40 INJECTION INTRAMUSCULAR; INTRAVENOUS DAILY
Status: DISCONTINUED | OUTPATIENT
Start: 2025-08-25 | End: 2025-08-27 | Stop reason: HOSPADM

## 2025-08-25 RX ORDER — LOSARTAN POTASSIUM 25 MG/1
25 TABLET ORAL DAILY
Status: DISCONTINUED | OUTPATIENT
Start: 2025-08-25 | End: 2025-08-27 | Stop reason: HOSPADM

## 2025-08-25 RX ORDER — ONDANSETRON 2 MG/ML
4 INJECTION INTRAMUSCULAR; INTRAVENOUS EVERY 6 HOURS PRN
Status: DISCONTINUED | OUTPATIENT
Start: 2025-08-25 | End: 2025-08-27 | Stop reason: HOSPADM

## 2025-08-25 RX ORDER — SODIUM CHLORIDE 9 MG/ML
INJECTION, SOLUTION INTRAVENOUS CONTINUOUS
Status: DISCONTINUED | OUTPATIENT
Start: 2025-08-25 | End: 2025-08-27

## 2025-08-25 RX ORDER — AZITHROMYCIN 250 MG/1
250 TABLET, FILM COATED ORAL DAILY
Status: DISCONTINUED | OUTPATIENT
Start: 2025-08-26 | End: 2025-08-27 | Stop reason: HOSPADM

## 2025-08-25 RX ORDER — FAMOTIDINE 20 MG/1
20 TABLET, FILM COATED ORAL DAILY
Status: DISCONTINUED | OUTPATIENT
Start: 2025-08-25 | End: 2025-08-27 | Stop reason: HOSPADM

## 2025-08-25 RX ORDER — ACETAMINOPHEN 325 MG/1
650 TABLET ORAL EVERY 4 HOURS PRN
Status: DISCONTINUED | OUTPATIENT
Start: 2025-08-25 | End: 2025-08-27 | Stop reason: HOSPADM

## 2025-08-25 RX ORDER — AMOXICILLIN 250 MG
2 CAPSULE ORAL 2 TIMES DAILY PRN
Status: DISCONTINUED | OUTPATIENT
Start: 2025-08-25 | End: 2025-08-27 | Stop reason: HOSPADM

## 2025-08-25 RX ORDER — ACETAMINOPHEN 650 MG/1
650 SUPPOSITORY RECTAL EVERY 4 HOURS PRN
Status: DISCONTINUED | OUTPATIENT
Start: 2025-08-25 | End: 2025-08-27 | Stop reason: HOSPADM

## 2025-08-25 RX ORDER — AMOXICILLIN 250 MG
1 CAPSULE ORAL 2 TIMES DAILY PRN
Status: DISCONTINUED | OUTPATIENT
Start: 2025-08-25 | End: 2025-08-27 | Stop reason: HOSPADM

## 2025-08-25 RX ORDER — IPRATROPIUM BROMIDE AND ALBUTEROL SULFATE 2.5; .5 MG/3ML; MG/3ML
3 SOLUTION RESPIRATORY (INHALATION)
Status: DISCONTINUED | OUTPATIENT
Start: 2025-08-25 | End: 2025-08-27 | Stop reason: HOSPADM

## 2025-08-25 RX ORDER — PANTOPRAZOLE SODIUM 40 MG/1
40 TABLET, DELAYED RELEASE ORAL
Status: DISCONTINUED | OUTPATIENT
Start: 2025-08-26 | End: 2025-08-27 | Stop reason: HOSPADM

## 2025-08-25 RX ORDER — SIMVASTATIN 20 MG
20 TABLET ORAL AT BEDTIME
Status: DISCONTINUED | OUTPATIENT
Start: 2025-08-25 | End: 2025-08-27 | Stop reason: HOSPADM

## 2025-08-25 RX ORDER — IPRATROPIUM BROMIDE AND ALBUTEROL SULFATE 2.5; .5 MG/3ML; MG/3ML
3 SOLUTION RESPIRATORY (INHALATION)
Status: DISCONTINUED | OUTPATIENT
Start: 2025-08-25 | End: 2025-08-25

## 2025-08-25 RX ORDER — ACETAMINOPHEN 500 MG
1000 TABLET ORAL EVERY 6 HOURS PRN
COMMUNITY

## 2025-08-25 RX ORDER — IPRATROPIUM BROMIDE AND ALBUTEROL SULFATE 2.5; .5 MG/3ML; MG/3ML
3 SOLUTION RESPIRATORY (INHALATION) EVERY 4 HOURS PRN
Status: DISCONTINUED | OUTPATIENT
Start: 2025-08-25 | End: 2025-08-27 | Stop reason: HOSPADM

## 2025-08-25 RX ORDER — AZITHROMYCIN 500 MG/1
500 TABLET, FILM COATED ORAL ONCE
Status: COMPLETED | OUTPATIENT
Start: 2025-08-25 | End: 2025-08-25

## 2025-08-25 RX ORDER — PROCHLORPERAZINE MALEATE 5 MG/1
5 TABLET ORAL EVERY 6 HOURS PRN
Status: DISCONTINUED | OUTPATIENT
Start: 2025-08-25 | End: 2025-08-27 | Stop reason: HOSPADM

## 2025-08-25 RX ADMIN — IPRATROPIUM BROMIDE AND ALBUTEROL SULFATE 3 ML: .5; 3 SOLUTION RESPIRATORY (INHALATION) at 06:09

## 2025-08-25 RX ADMIN — IPRATROPIUM BROMIDE AND ALBUTEROL SULFATE 3 ML: .5; 3 SOLUTION RESPIRATORY (INHALATION) at 03:12

## 2025-08-25 RX ADMIN — SODIUM CHLORIDE: 0.9 INJECTION, SOLUTION INTRAVENOUS at 01:01

## 2025-08-25 RX ADMIN — SIMVASTATIN 20 MG: 20 TABLET, FILM COATED ORAL at 21:45

## 2025-08-25 RX ADMIN — IPRATROPIUM BROMIDE AND ALBUTEROL SULFATE 3 ML: .5; 3 SOLUTION RESPIRATORY (INHALATION) at 19:38

## 2025-08-25 RX ADMIN — LOSARTAN POTASSIUM 25 MG: 25 TABLET, FILM COATED ORAL at 11:21

## 2025-08-25 RX ADMIN — METHYLPREDNISOLONE SODIUM SUCCINATE 40 MG: 40 INJECTION, POWDER, FOR SOLUTION INTRAMUSCULAR; INTRAVENOUS at 08:03

## 2025-08-25 RX ADMIN — IPRATROPIUM BROMIDE AND ALBUTEROL SULFATE 3 ML: .5; 3 SOLUTION RESPIRATORY (INHALATION) at 13:28

## 2025-08-25 RX ADMIN — AZITHROMYCIN MONOHYDRATE 500 MG: 500 TABLET ORAL at 01:31

## 2025-08-25 RX ADMIN — FAMOTIDINE 20 MG: 20 TABLET, FILM COATED ORAL at 11:21

## 2025-08-25 ASSESSMENT — ACTIVITIES OF DAILY LIVING (ADL)
ADLS_ACUITY_SCORE: 57
ADLS_ACUITY_SCORE: 55
ADLS_ACUITY_SCORE: 57
ADLS_ACUITY_SCORE: 55
ADLS_ACUITY_SCORE: 57
ADLS_ACUITY_SCORE: 55
ADLS_ACUITY_SCORE: 57
ADLS_ACUITY_SCORE: 55
ADLS_ACUITY_SCORE: 37
ADLS_ACUITY_SCORE: 57
ADLS_ACUITY_SCORE: 55
ADLS_ACUITY_SCORE: 37
ADLS_ACUITY_SCORE: 55
ADLS_ACUITY_SCORE: 55

## 2025-08-26 PROCEDURE — 258N000003 HC RX IP 258 OP 636: Performed by: INTERNAL MEDICINE

## 2025-08-26 PROCEDURE — G0378 HOSPITAL OBSERVATION PER HR: HCPCS

## 2025-08-26 PROCEDURE — 250N000013 HC RX MED GY IP 250 OP 250 PS 637: Performed by: INTERNAL MEDICINE

## 2025-08-26 PROCEDURE — 94640 AIRWAY INHALATION TREATMENT: CPT | Mod: 76

## 2025-08-26 PROCEDURE — 250N000011 HC RX IP 250 OP 636: Mod: JZ | Performed by: INTERNAL MEDICINE

## 2025-08-26 PROCEDURE — 99215 OFFICE O/P EST HI 40 MIN: CPT | Performed by: INTERNAL MEDICINE

## 2025-08-26 PROCEDURE — 96376 TX/PRO/DX INJ SAME DRUG ADON: CPT

## 2025-08-26 PROCEDURE — 250N000009 HC RX 250: Performed by: INTERNAL MEDICINE

## 2025-08-26 PROCEDURE — 999N000157 HC STATISTIC RCP TIME EA 10 MIN

## 2025-08-26 RX ADMIN — FAMOTIDINE 20 MG: 20 TABLET, FILM COATED ORAL at 08:27

## 2025-08-26 RX ADMIN — IPRATROPIUM BROMIDE AND ALBUTEROL SULFATE 3 ML: .5; 3 SOLUTION RESPIRATORY (INHALATION) at 20:10

## 2025-08-26 RX ADMIN — IPRATROPIUM BROMIDE AND ALBUTEROL SULFATE 3 ML: .5; 3 SOLUTION RESPIRATORY (INHALATION) at 02:52

## 2025-08-26 RX ADMIN — IPRATROPIUM BROMIDE AND ALBUTEROL SULFATE 3 ML: .5; 3 SOLUTION RESPIRATORY (INHALATION) at 14:16

## 2025-08-26 RX ADMIN — SODIUM CHLORIDE: 0.9 INJECTION, SOLUTION INTRAVENOUS at 03:11

## 2025-08-26 RX ADMIN — ACETAMINOPHEN 650 MG: 325 TABLET ORAL at 04:13

## 2025-08-26 RX ADMIN — LOSARTAN POTASSIUM 25 MG: 25 TABLET, FILM COATED ORAL at 08:27

## 2025-08-26 RX ADMIN — ACETAMINOPHEN 650 MG: 325 TABLET ORAL at 19:58

## 2025-08-26 RX ADMIN — SIMVASTATIN 20 MG: 20 TABLET, FILM COATED ORAL at 21:56

## 2025-08-26 RX ADMIN — AZITHROMYCIN DIHYDRATE 250 MG: 250 TABLET, FILM COATED ORAL at 08:28

## 2025-08-26 RX ADMIN — METHYLPREDNISOLONE SODIUM SUCCINATE 40 MG: 40 INJECTION, POWDER, FOR SOLUTION INTRAMUSCULAR; INTRAVENOUS at 08:27

## 2025-08-26 RX ADMIN — ACETAMINOPHEN 650 MG: 325 TABLET ORAL at 09:48

## 2025-08-26 RX ADMIN — IPRATROPIUM BROMIDE AND ALBUTEROL SULFATE 3 ML: .5; 3 SOLUTION RESPIRATORY (INHALATION) at 08:02

## 2025-08-26 RX ADMIN — PANTOPRAZOLE SODIUM 40 MG: 40 TABLET, DELAYED RELEASE ORAL at 08:27

## 2025-08-26 ASSESSMENT — ACTIVITIES OF DAILY LIVING (ADL)
ADLS_ACUITY_SCORE: 38
ADLS_ACUITY_SCORE: 37
ADLS_ACUITY_SCORE: 38
ADLS_ACUITY_SCORE: 37
ADLS_ACUITY_SCORE: 38
ADLS_ACUITY_SCORE: 37
ADLS_ACUITY_SCORE: 38
ADLS_ACUITY_SCORE: 37
ADLS_ACUITY_SCORE: 37
ADLS_ACUITY_SCORE: 38
ADLS_ACUITY_SCORE: 37
ADLS_ACUITY_SCORE: 38
ADLS_ACUITY_SCORE: 38
ADLS_ACUITY_SCORE: 37
ADLS_ACUITY_SCORE: 38

## 2025-08-27 VITALS
HEART RATE: 80 BPM | OXYGEN SATURATION: 93 % | BODY MASS INDEX: 28.44 KG/M2 | SYSTOLIC BLOOD PRESSURE: 123 MMHG | RESPIRATION RATE: 17 BRPM | TEMPERATURE: 98.5 F | DIASTOLIC BLOOD PRESSURE: 57 MMHG | HEIGHT: 63 IN | WEIGHT: 160.5 LBS

## 2025-08-27 PROCEDURE — 250N000013 HC RX MED GY IP 250 OP 250 PS 637: Performed by: INTERNAL MEDICINE

## 2025-08-27 PROCEDURE — 99239 HOSP IP/OBS DSCHRG MGMT >30: CPT | Performed by: INTERNAL MEDICINE

## 2025-08-27 PROCEDURE — G0378 HOSPITAL OBSERVATION PER HR: HCPCS

## 2025-08-27 PROCEDURE — 94640 AIRWAY INHALATION TREATMENT: CPT | Mod: 76

## 2025-08-27 PROCEDURE — 999N000157 HC STATISTIC RCP TIME EA 10 MIN

## 2025-08-27 PROCEDURE — 96376 TX/PRO/DX INJ SAME DRUG ADON: CPT

## 2025-08-27 PROCEDURE — 250N000009 HC RX 250: Performed by: INTERNAL MEDICINE

## 2025-08-27 PROCEDURE — 250N000011 HC RX IP 250 OP 636: Mod: JZ | Performed by: INTERNAL MEDICINE

## 2025-08-27 RX ORDER — PANTOPRAZOLE SODIUM 40 MG/1
40 TABLET, DELAYED RELEASE ORAL
Qty: 30 TABLET | Refills: 0 | Status: SHIPPED | OUTPATIENT
Start: 2025-08-27 | End: 2025-09-26

## 2025-08-27 RX ORDER — AZITHROMYCIN 250 MG/1
250 TABLET, FILM COATED ORAL DAILY
Qty: 2 TABLET | Refills: 0 | Status: SHIPPED | OUTPATIENT
Start: 2025-08-28 | End: 2025-08-27

## 2025-08-27 RX ORDER — IPRATROPIUM BROMIDE AND ALBUTEROL SULFATE 2.5; .5 MG/3ML; MG/3ML
3 SOLUTION RESPIRATORY (INHALATION) EVERY 4 HOURS PRN
Qty: 90 ML | Refills: 1 | Status: SHIPPED | OUTPATIENT
Start: 2025-08-27

## 2025-08-27 RX ORDER — PREDNISONE 20 MG/1
40 TABLET ORAL DAILY
Qty: 10 TABLET | Refills: 0 | Status: SHIPPED | OUTPATIENT
Start: 2025-08-27 | End: 2025-09-01

## 2025-08-27 RX ORDER — AZITHROMYCIN 250 MG/1
250 TABLET, FILM COATED ORAL DAILY
Qty: 2 TABLET | Refills: 0 | Status: SHIPPED | OUTPATIENT
Start: 2025-08-28

## 2025-08-27 RX ADMIN — PANTOPRAZOLE SODIUM 40 MG: 40 TABLET, DELAYED RELEASE ORAL at 08:05

## 2025-08-27 RX ADMIN — FAMOTIDINE 20 MG: 20 TABLET, FILM COATED ORAL at 08:05

## 2025-08-27 RX ADMIN — METHYLPREDNISOLONE SODIUM SUCCINATE 40 MG: 40 INJECTION, POWDER, FOR SOLUTION INTRAMUSCULAR; INTRAVENOUS at 08:06

## 2025-08-27 RX ADMIN — ACETAMINOPHEN 650 MG: 325 TABLET ORAL at 08:07

## 2025-08-27 RX ADMIN — LOSARTAN POTASSIUM 25 MG: 25 TABLET, FILM COATED ORAL at 08:05

## 2025-08-27 RX ADMIN — IPRATROPIUM BROMIDE AND ALBUTEROL SULFATE 3 ML: .5; 3 SOLUTION RESPIRATORY (INHALATION) at 02:12

## 2025-08-27 RX ADMIN — AZITHROMYCIN DIHYDRATE 250 MG: 250 TABLET, FILM COATED ORAL at 08:06

## 2025-08-27 RX ADMIN — IPRATROPIUM BROMIDE AND ALBUTEROL SULFATE 3 ML: .5; 3 SOLUTION RESPIRATORY (INHALATION) at 07:17

## 2025-08-27 ASSESSMENT — ACTIVITIES OF DAILY LIVING (ADL)
ADLS_ACUITY_SCORE: 38

## 2025-08-28 DIAGNOSIS — J44.9 COPD (CHRONIC OBSTRUCTIVE PULMONARY DISEASE) (H): Primary | ICD-10-CM
